# Patient Record
Sex: FEMALE | URBAN - METROPOLITAN AREA
[De-identification: names, ages, dates, MRNs, and addresses within clinical notes are randomized per-mention and may not be internally consistent; named-entity substitution may affect disease eponyms.]

---

## 2019-09-02 ENCOUNTER — INPATIENT (INPATIENT)
Facility: HOSPITAL | Age: 54
LOS: 21 days | Discharge: HOME | DRG: 853 | End: 2019-09-24
Attending: INTERNAL MEDICINE | Admitting: INTERNAL MEDICINE
Payer: MEDICAID

## 2019-09-02 VITALS
SYSTOLIC BLOOD PRESSURE: 109 MMHG | DIASTOLIC BLOOD PRESSURE: 66 MMHG | WEIGHT: 143.96 LBS | OXYGEN SATURATION: 86 % | HEART RATE: 128 BPM | HEIGHT: 63 IN | TEMPERATURE: 98 F | RESPIRATION RATE: 24 BRPM

## 2019-09-02 DIAGNOSIS — F11.10 OPIOID ABUSE, UNCOMPLICATED: ICD-10-CM

## 2019-09-02 DIAGNOSIS — J45.901 UNSPECIFIED ASTHMA WITH (ACUTE) EXACERBATION: ICD-10-CM

## 2019-09-02 DIAGNOSIS — D72.819 DECREASED WHITE BLOOD CELL COUNT, UNSPECIFIED: ICD-10-CM

## 2019-09-02 DIAGNOSIS — J18.1 LOBAR PNEUMONIA, UNSPECIFIED ORGANISM: ICD-10-CM

## 2019-09-02 DIAGNOSIS — Z29.9 ENCOUNTER FOR PROPHYLACTIC MEASURES, UNSPECIFIED: ICD-10-CM

## 2019-09-02 DIAGNOSIS — R07.89 OTHER CHEST PAIN: ICD-10-CM

## 2019-09-02 LAB
ALBUMIN SERPL ELPH-MCNC: 2.8 G/DL — LOW (ref 3.5–5)
ALP SERPL-CCNC: 74 U/L — SIGNIFICANT CHANGE UP (ref 40–120)
ALT FLD-CCNC: 30 U/L DA — SIGNIFICANT CHANGE UP (ref 10–60)
ANION GAP SERPL CALC-SCNC: 8 MMOL/L — SIGNIFICANT CHANGE UP (ref 5–17)
AST SERPL-CCNC: 29 U/L — SIGNIFICANT CHANGE UP (ref 10–40)
BILIRUB SERPL-MCNC: 0.9 MG/DL — SIGNIFICANT CHANGE UP (ref 0.2–1.2)
BUN SERPL-MCNC: 18 MG/DL — SIGNIFICANT CHANGE UP (ref 7–18)
CALCIUM SERPL-MCNC: 9 MG/DL — SIGNIFICANT CHANGE UP (ref 8.4–10.5)
CHLORIDE SERPL-SCNC: 100 MMOL/L — SIGNIFICANT CHANGE UP (ref 96–108)
CO2 SERPL-SCNC: 27 MMOL/L — SIGNIFICANT CHANGE UP (ref 22–31)
CREAT SERPL-MCNC: 1 MG/DL — SIGNIFICANT CHANGE UP (ref 0.5–1.3)
GLUCOSE SERPL-MCNC: 117 MG/DL — HIGH (ref 70–99)
HCT VFR BLD CALC: 42.3 % — SIGNIFICANT CHANGE UP (ref 34.5–45)
HGB BLD-MCNC: 14 G/DL — SIGNIFICANT CHANGE UP (ref 11.5–15.5)
MCHC RBC-ENTMCNC: 31.4 PG — SIGNIFICANT CHANGE UP (ref 27–34)
MCHC RBC-ENTMCNC: 33.1 GM/DL — SIGNIFICANT CHANGE UP (ref 32–36)
MCV RBC AUTO: 94.8 FL — SIGNIFICANT CHANGE UP (ref 80–100)
NRBC # BLD: 0 /100 WBCS — SIGNIFICANT CHANGE UP (ref 0–0)
PLATELET # BLD AUTO: 179 K/UL — SIGNIFICANT CHANGE UP (ref 150–400)
POTASSIUM SERPL-MCNC: 3.8 MMOL/L — SIGNIFICANT CHANGE UP (ref 3.5–5.3)
POTASSIUM SERPL-SCNC: 3.8 MMOL/L — SIGNIFICANT CHANGE UP (ref 3.5–5.3)
PROT SERPL-MCNC: 7.5 G/DL — SIGNIFICANT CHANGE UP (ref 6–8.3)
RBC # BLD: 4.46 M/UL — SIGNIFICANT CHANGE UP (ref 3.8–5.2)
RBC # FLD: 12.9 % — SIGNIFICANT CHANGE UP (ref 10.3–14.5)
SODIUM SERPL-SCNC: 135 MMOL/L — SIGNIFICANT CHANGE UP (ref 135–145)
TROPONIN I SERPL-MCNC: <0.015 NG/ML — SIGNIFICANT CHANGE UP (ref 0–0.04)
TROPONIN I SERPL-MCNC: <0.015 NG/ML — SIGNIFICANT CHANGE UP (ref 0–0.04)
WBC # BLD: 2.2 K/UL — LOW (ref 3.8–10.5)
WBC # FLD AUTO: 2.2 K/UL — LOW (ref 3.8–10.5)

## 2019-09-02 PROCEDURE — 71250 CT THORAX DX C-: CPT | Mod: 26

## 2019-09-02 PROCEDURE — 99285 EMERGENCY DEPT VISIT HI MDM: CPT

## 2019-09-02 PROCEDURE — 71045 X-RAY EXAM CHEST 1 VIEW: CPT | Mod: 26

## 2019-09-02 RX ORDER — ALBUTEROL 90 UG/1
1.25 AEROSOL, METERED ORAL EVERY 6 HOURS
Refills: 0 | Status: DISCONTINUED | OUTPATIENT
Start: 2019-09-02 | End: 2019-09-02

## 2019-09-02 RX ORDER — SODIUM CHLORIDE 9 MG/ML
1000 INJECTION INTRAMUSCULAR; INTRAVENOUS; SUBCUTANEOUS ONCE
Refills: 0 | Status: COMPLETED | OUTPATIENT
Start: 2019-09-02 | End: 2019-09-02

## 2019-09-02 RX ORDER — LORATADINE 10 MG/1
10 TABLET ORAL DAILY
Refills: 0 | Status: DISCONTINUED | OUTPATIENT
Start: 2019-09-02 | End: 2019-09-24

## 2019-09-02 RX ORDER — KETOROLAC TROMETHAMINE 30 MG/ML
15 SYRINGE (ML) INJECTION ONCE
Refills: 0 | Status: DISCONTINUED | OUTPATIENT
Start: 2019-09-02 | End: 2019-09-02

## 2019-09-02 RX ORDER — IPRATROPIUM/ALBUTEROL SULFATE 18-103MCG
3 AEROSOL WITH ADAPTER (GRAM) INHALATION ONCE
Refills: 0 | Status: COMPLETED | OUTPATIENT
Start: 2019-09-02 | End: 2019-09-02

## 2019-09-02 RX ORDER — CEFTRIAXONE 500 MG/1
1000 INJECTION, POWDER, FOR SOLUTION INTRAMUSCULAR; INTRAVENOUS ONCE
Refills: 0 | Status: COMPLETED | OUTPATIENT
Start: 2019-09-02 | End: 2019-09-02

## 2019-09-02 RX ORDER — METRONIDAZOLE 500 MG
500 TABLET ORAL EVERY 8 HOURS
Refills: 0 | Status: DISCONTINUED | OUTPATIENT
Start: 2019-09-02 | End: 2019-09-02

## 2019-09-02 RX ORDER — AZITHROMYCIN 500 MG/1
500 TABLET, FILM COATED ORAL ONCE
Refills: 0 | Status: COMPLETED | OUTPATIENT
Start: 2019-09-02 | End: 2019-09-02

## 2019-09-02 RX ORDER — MAGNESIUM SULFATE 500 MG/ML
2 VIAL (ML) INJECTION ONCE
Refills: 0 | Status: COMPLETED | OUTPATIENT
Start: 2019-09-02 | End: 2019-09-02

## 2019-09-02 RX ORDER — IPRATROPIUM/ALBUTEROL SULFATE 18-103MCG
3 AEROSOL WITH ADAPTER (GRAM) INHALATION EVERY 8 HOURS
Refills: 0 | Status: DISCONTINUED | OUTPATIENT
Start: 2019-09-02 | End: 2019-09-24

## 2019-09-02 RX ORDER — IPRATROPIUM/ALBUTEROL SULFATE 18-103MCG
3 AEROSOL WITH ADAPTER (GRAM) INHALATION EVERY 6 HOURS
Refills: 0 | Status: DISCONTINUED | OUTPATIENT
Start: 2019-09-02 | End: 2019-09-24

## 2019-09-02 RX ORDER — PIPERACILLIN AND TAZOBACTAM 4; .5 G/20ML; G/20ML
3.38 INJECTION, POWDER, LYOPHILIZED, FOR SOLUTION INTRAVENOUS EVERY 8 HOURS
Refills: 0 | Status: COMPLETED | OUTPATIENT
Start: 2019-09-02 | End: 2019-09-12

## 2019-09-02 RX ORDER — ACETAMINOPHEN 500 MG
650 TABLET ORAL EVERY 6 HOURS
Refills: 0 | Status: DISCONTINUED | OUTPATIENT
Start: 2019-09-02 | End: 2019-09-24

## 2019-09-02 RX ORDER — KETOROLAC TROMETHAMINE 30 MG/ML
15 SYRINGE (ML) INJECTION EVERY 8 HOURS
Refills: 0 | Status: DISCONTINUED | OUTPATIENT
Start: 2019-09-02 | End: 2019-09-05

## 2019-09-02 RX ORDER — PIPERACILLIN AND TAZOBACTAM 4; .5 G/20ML; G/20ML
3.38 INJECTION, POWDER, LYOPHILIZED, FOR SOLUTION INTRAVENOUS ONCE
Refills: 0 | Status: DISCONTINUED | OUTPATIENT
Start: 2019-09-02 | End: 2019-09-19

## 2019-09-02 RX ORDER — HEPARIN SODIUM 5000 [USP'U]/ML
5000 INJECTION INTRAVENOUS; SUBCUTANEOUS EVERY 8 HOURS
Refills: 0 | Status: DISCONTINUED | OUTPATIENT
Start: 2019-09-02 | End: 2019-09-11

## 2019-09-02 RX ORDER — ALBUTEROL 90 UG/1
2.5 AEROSOL, METERED ORAL EVERY 6 HOURS
Refills: 0 | Status: DISCONTINUED | OUTPATIENT
Start: 2019-09-02 | End: 2019-09-02

## 2019-09-02 RX ORDER — METHADONE HYDROCHLORIDE 40 MG/1
55 TABLET ORAL DAILY
Refills: 0 | Status: DISCONTINUED | OUTPATIENT
Start: 2019-09-02 | End: 2019-09-02

## 2019-09-02 RX ORDER — METRONIDAZOLE 500 MG
500 TABLET ORAL ONCE
Refills: 0 | Status: COMPLETED | OUTPATIENT
Start: 2019-09-02 | End: 2019-09-02

## 2019-09-02 RX ORDER — SODIUM CHLORIDE 9 MG/ML
1000 INJECTION INTRAMUSCULAR; INTRAVENOUS; SUBCUTANEOUS
Refills: 0 | Status: DISCONTINUED | OUTPATIENT
Start: 2019-09-02 | End: 2019-09-13

## 2019-09-02 RX ORDER — CEFTRIAXONE 500 MG/1
1000 INJECTION, POWDER, FOR SOLUTION INTRAMUSCULAR; INTRAVENOUS EVERY 24 HOURS
Refills: 0 | Status: DISCONTINUED | OUTPATIENT
Start: 2019-09-02 | End: 2019-09-02

## 2019-09-02 RX ORDER — AZITHROMYCIN 500 MG/1
TABLET, FILM COATED ORAL
Refills: 0 | Status: DISCONTINUED | OUTPATIENT
Start: 2019-09-02 | End: 2019-09-02

## 2019-09-02 RX ORDER — METRONIDAZOLE 500 MG
TABLET ORAL
Refills: 0 | Status: DISCONTINUED | OUTPATIENT
Start: 2019-09-02 | End: 2019-09-02

## 2019-09-02 RX ADMIN — Medication 15 MILLIGRAM(S): at 13:31

## 2019-09-02 RX ADMIN — HEPARIN SODIUM 5000 UNIT(S): 5000 INJECTION INTRAVENOUS; SUBCUTANEOUS at 22:01

## 2019-09-02 RX ADMIN — Medication 125 MILLIGRAM(S): at 09:21

## 2019-09-02 RX ADMIN — Medication 50 GRAM(S): at 09:21

## 2019-09-02 RX ADMIN — HEPARIN SODIUM 5000 UNIT(S): 5000 INJECTION INTRAVENOUS; SUBCUTANEOUS at 15:20

## 2019-09-02 RX ADMIN — Medication 15 MILLIGRAM(S): at 09:20

## 2019-09-02 RX ADMIN — Medication 40 MILLIGRAM(S): at 18:11

## 2019-09-02 RX ADMIN — Medication 3 MILLILITER(S): at 09:51

## 2019-09-02 RX ADMIN — Medication 3 MILLILITER(S): at 09:21

## 2019-09-02 RX ADMIN — Medication 15 MILLIGRAM(S): at 22:01

## 2019-09-02 RX ADMIN — Medication 100 MILLIGRAM(S): at 19:46

## 2019-09-02 RX ADMIN — Medication 100 MILLIGRAM(S): at 18:05

## 2019-09-02 RX ADMIN — Medication 3 MILLILITER(S): at 15:22

## 2019-09-02 RX ADMIN — Medication 15 MILLIGRAM(S): at 22:33

## 2019-09-02 RX ADMIN — Medication 15 MILLIGRAM(S): at 23:05

## 2019-09-02 RX ADMIN — Medication 3 MILLILITER(S): at 22:01

## 2019-09-02 RX ADMIN — Medication 15 MILLIGRAM(S): at 10:57

## 2019-09-02 RX ADMIN — SODIUM CHLORIDE 100 MILLILITER(S): 9 INJECTION INTRAMUSCULAR; INTRAVENOUS; SUBCUTANEOUS at 23:06

## 2019-09-02 RX ADMIN — Medication 3 MILLILITER(S): at 09:36

## 2019-09-02 RX ADMIN — SODIUM CHLORIDE 2000 MILLILITER(S): 9 INJECTION INTRAMUSCULAR; INTRAVENOUS; SUBCUTANEOUS at 20:43

## 2019-09-02 RX ADMIN — Medication 650 MILLIGRAM(S): at 20:43

## 2019-09-02 RX ADMIN — AZITHROMYCIN 250 MILLIGRAM(S): 500 TABLET, FILM COATED ORAL at 13:01

## 2019-09-02 RX ADMIN — PIPERACILLIN AND TAZOBACTAM 25 GRAM(S): 4; .5 INJECTION, POWDER, LYOPHILIZED, FOR SOLUTION INTRAVENOUS at 22:01

## 2019-09-02 RX ADMIN — CEFTRIAXONE 100 MILLIGRAM(S): 500 INJECTION, POWDER, FOR SOLUTION INTRAMUSCULAR; INTRAVENOUS at 10:47

## 2019-09-02 RX ADMIN — LORATADINE 10 MILLIGRAM(S): 10 TABLET ORAL at 18:15

## 2019-09-02 RX ADMIN — Medication 650 MILLIGRAM(S): at 19:46

## 2019-09-02 RX ADMIN — SODIUM CHLORIDE 2000 MILLILITER(S): 9 INJECTION INTRAMUSCULAR; INTRAVENOUS; SUBCUTANEOUS at 22:03

## 2019-09-02 NOTE — ED ADULT NURSE NOTE - NSIMPLEMENTINTERV_GEN_ALL_ED
Implemented All Universal Safety Interventions:  [place] to call system. Call bell, personal items and telephone within reach. Instruct patient to call for assistance. Room bathroom lighting operational. Non-slip footwear when patient is off stretcher. Physically safe environment: no spills, clutter or unnecessary equipment. Stretcher in lowest position, wheels locked, appropriate side rails in place.

## 2019-09-02 NOTE — H&P ADULT - NSHPPHYSICALEXAM_GEN_ALL_CORE
Vital Signs Last 24 Hrs  T(C): 37.2 (02 Sep 2019 11:30), Max: 37.2 (02 Sep 2019 11:30)  T(F): 99 (02 Sep 2019 11:30), Max: 99 (02 Sep 2019 11:30)  HR: 102 (02 Sep 2019 11:30) (102 - 128)  BP: 103/63 (02 Sep 2019 11:30) (103/63 - 109/66)  BP(mean): --  ABP: --  ABP(mean): --  RR: 18 (02 Sep 2019 11:30) (18 - 24)  SpO2: 97% (02 Sep 2019 11:30) (86% - 97%)      PHYSICAL EXAM:  GENERAL: NAD  HEENT: Normocephalic;  conjunctivae and sclerae clear; moist mucous membranes;   NECK : supple  CHEST/LUNG: Clear to auscultation bilaterally with good air entry   HEART: S1 S2  regular; no murmurs, gallops or rubs  ABDOMEN: Soft, Nontender, Nondistended; Bowel sounds present  EXTREMITIES: no cyanosis; no edema; no calf tenderness  SKIN: warm and dry; no rash  NERVOUS SYSTEM:  Awake and alert; Oriented  to place, person and time ; no new deficits Vital Signs Last 24 Hrs  T(C): 37.2 (02 Sep 2019 11:30), Max: 37.2 (02 Sep 2019 11:30)  T(F): 99 (02 Sep 2019 11:30), Max: 99 (02 Sep 2019 11:30)  HR: 102 (02 Sep 2019 11:30) (102 - 128)  BP: 103/63 (02 Sep 2019 11:30) (103/63 - 109/66)  RR: 18 (02 Sep 2019 11:30) (18 - 24)  SpO2: 97% (02 Sep 2019 11:30) (86% - 97%)      PHYSICAL EXAM:  GENERAL: female in distress due to dyspnea  HEENT: Normocephalic;  conjunctivae and sclerae clear; moist mucous membranes;   NECK : supple  CHEST/LUNG: breath sounds present b/l   HEART: S1 S2  regular; no murmurs, gallops or rubs  ABDOMEN: Soft, Nontender, Nondistended; Bowel sounds present  EXTREMITIES: no cyanosis; no edema; no calf tenderness  SKIN: warm and dry; no rash  NERVOUS SYSTEM:  Awake and alert; Oriented  to place, person and time ; no new deficits Vital Signs Last 24 Hrs  T(C): 37.2 (02 Sep 2019 11:30), Max: 37.2 (02 Sep 2019 11:30)  T(F): 99 (02 Sep 2019 11:30), Max: 99 (02 Sep 2019 11:30)  HR: 102 (02 Sep 2019 11:30) (102 - 128)  BP: 103/63 (02 Sep 2019 11:30) (103/63 - 109/66)  RR: 18 (02 Sep 2019 11:30) (18 - 24)  SpO2: 97% (02 Sep 2019 11:30) (86% - 97%)      PHYSICAL EXAM:  GENERAL: female in distress due to dyspnea  HEENT: Normocephalic;  conjunctivae and sclerae clear; moist mucous membranes;   NECK : supple  CHEST/LUNG: breath sounds present b/l, but decreased on right lower side.   HEART: S1 S2  regular; no murmurs, gallops or rubs  ABDOMEN: Soft, Nontender, Nondistended; Bowel sounds present  EXTREMITIES: no cyanosis; no edema; no calf tenderness  SKIN: warm and dry; no rash  NERVOUS SYSTEM:  Awake and alert; Oriented  to place, person and time ; no new deficits

## 2019-09-02 NOTE — H&P ADULT - PROBLEM SELECTOR PLAN 1
-P/w Cough, SOB, Chest pain since yesterday with 40 pack year history of smoking   - CXR: Opacification/consolidation of the right lower lung field. This most   likely represents pneumonia. A superimposed right pleural effusion cannot   be excluded.  CT chest: Soft tissue impaction of the right middle and lower lobe   bronchi. Extensive airspace consolidations of the right middle and lower   lobes. Overall findings may reflect severe multifocal pneumonia, however   close follow-up to in short resolution and exclude endobronchial mass.   Mild mediastinal adenopathy.  s/p : Rocephin, Azithromycin in Ed   - Will c/w Rocephin, Azithro   -Duo neb   -follow Blood culture   Repeat CT chest after discharge for concern of Malignancy -P/w Cough, SOB, Chest pain since yesterday with 40 pack year history of smoking   - CXR: Opacification/consolidation of the right lower lung field. This most   likely represents pneumonia. A superimposed right pleural effusion cannot   be excluded.  CT chest: Soft tissue impaction of the right middle and lower lobe   bronchi. Extensive airspace consolidations of the right middle and lower   lobes. Overall findings may reflect severe multifocal pneumonia, however   close follow-up to in short resolution and exclude endobronchial mass.   Mild mediastinal adenopathy.  s/p : Rocephin, Azithromycin in Ed   - Will c/w Rocephin, Azithro and Flagyl   -Duo neb   -follow Blood culture   CT chesT: concern for malignancy. Dr Guo consulted. Patient may need Bronchoscopy vs IR guided Biopsy depending on Pulmonary recommendation

## 2019-09-02 NOTE — H&P ADULT - ASSESSMENT
53 yo female with PMH of Asthma, presented with SOB, cough and right sided chest pain since yesterday morning. She started feeling short of breath, present at rest, not relieved with Albuterol inhaler, unable to ambulate due to SOB since yesterday. It was associated with wheeze and cough with whitish sputum. She also complains of subjective fever. Chest pain is present on right lower side, 7/10 in severity, on/off, non radiating, stabbing in nature, worse with sitting and moving and lying on that side, relieved with Motrin.     Admitted for Right sided PNA, Asthma exacerbation and concern for Malignancy 53 yo female with PMH of Asthma, presented with SOB, cough and right sided chest pain since yesterday morning. She started feeling short of breath, present at rest, not relieved with Albuterol inhaler, unable to ambulate due to SOB since yesterday. It was associated with wheeze and cough with whitish sputum. She also complains of subjective fever. Chest pain is present on right lower side, 7/10 in severity, on/off, non radiating, stabbing in nature, worse with sitting and moving and lying on that side, relieved with Motrin.     Admitted for Right sided PNA, Asthma exacerbation and concern for Malignancy     GOC; Full code

## 2019-09-02 NOTE — ED ADULT NURSE NOTE - OBJECTIVE STATEMENT
Patient came to the ED a/o x 3 BIBA for shortness of breath and "rib/ lung pain". Pt has no chest pain, no distress noted.

## 2019-09-02 NOTE — CONSULT NOTE ADULT - SUBJECTIVE AND OBJECTIVE BOX
Time of visit:    CHIEF COMPLAINT: Patient is a 54y old  Female who presents with a chief complaint of SOB, Right sided CP, cough since yesterday morning (02 Sep 2019 13:00)      HPI:  55 yo female with PMH of Asthma, presented with SOB, cough and right sided chest pain since yesterday morning. She started feeling short of breath, present at rest, not relieved with Albuterol inhaler, unable to ambulate due to SOB since yesterday. It was associated with wheeze and cough with whitish sputum. She also complains of subjective fever. Chest pain is present on right lower side, 7/10 in severity, on/off, non radiating, stabbing in nature, worse with sitting and moving and lying on that side, relieved with Motrin.     Off note, she quit Heroin abuse on August 3rd and since then has been on Methadone 55mg OD. She goes to the Methadone clinic every day, 6 days a week for her Methadone dose. Clinic #: [phone]-Ext: [phone]. She has taken her dose for today. Primary team to call Clinic to confirm dose. Clinic was closed today. (02 Sep 2019 13:00)   Patient seen and examined.     PAST MEDICAL & SURGICAL HISTORY:  Asthma  No significant past surgical history      Allergies    No Known Allergies    Intolerances        MEDICATIONS  (STANDING):  ALBUTerol/ipratropium for Nebulization 3 milliLiter(s) Nebulizer every 6 hours  azithromycin  IVPB      cefTRIAXone   IVPB 1000 milliGRAM(s) IV Intermittent every 24 hours  heparin  Injectable 5000 Unit(s) SubCutaneous every 8 hours  loratadine 10 milliGRAM(s) Oral daily  methylPREDNISolone sodium succinate Injectable 40 milliGRAM(s) IV Push every 6 hours  metroNIDAZOLE  IVPB 500 milliGRAM(s) IV Intermittent every 8 hours  metroNIDAZOLE  IVPB          MEDICATIONS  (PRN):  acetaminophen   Tablet .. 650 milliGRAM(s) Oral every 6 hours PRN Temp greater or equal to 38C (100.4F), Mild Pain (1 - 3)  ALBUTerol/ipratropium for Nebulization 3 milliLiter(s) Nebulizer every 8 hours PRN Bronchospasm  guaiFENesin    Syrup 100 milliGRAM(s) Oral every 6 hours PRN Cough  ketorolac   Injectable 15 milliGRAM(s) IV Push every 8 hours PRN Moderate Pain (4 - 6)   Medications up to date at time of exam.    Medications up to date at time of exam.    FAMILY HISTORY:  No pertinent family history in first degree relatives      SOCIAL HISTORY  Smoking History: [   ] smoking/smoke exposure, [   ] former smoker  Living Condition: [   ] apartment, [   ] private house  Work History:   Travel History: denies recent travel  Illicit Substance Use: denies  Alcohol Use: denies    REVIEW OF SYSTEMS:    CONSTITUTIONAL:  denies fevers, chills, sweats, weight loss    HEENT:  denies diplopia or blurred vision, sore throat or runny nose.    CARDIOVASCULAR:  denies pressure, squeezing, tightness, or heaviness about the chest; no palpitations.    RESPIRATORY:  denies SOB, cough, PARRISH, wheezing.    GASTROINTESTINAL:  denies abdominal pain, nausea, vomiting or diarrhea.    GENITOURINARY: denies dysuria, frequency or urgency.    NEUROLOGIC:  denies numbness, tingling, seizures or weakness.    PSYCHIATRIC:  denies disorder of thought or mood.    MSK: denies swelling, redness      PHYSICAL EXAMINATION:    GENERAL: The patient is a well-developed, well-nourished, in no apparent distress.     Vital Signs Last 24 Hrs  T(C): 37.1 (02 Sep 2019 15:00), Max: 37.2 (02 Sep 2019 11:30)  T(F): 98.8 (02 Sep 2019 15:00), Max: 99 (02 Sep 2019 11:30)  HR: 110 (02 Sep 2019 15:00) (102 - 128)  BP: 103/68 (02 Sep 2019 15:00) (103/63 - 109/66)  BP(mean): 75 (02 Sep 2019 15:00) (75 - 75)  RR: 22 (02 Sep 2019 15:00) (18 - 24)  SpO2: 99% (02 Sep 2019 15:00) (86% - 99%)   (if applicable)    Chest Tube (if applicable)    HEENT: Head is normocephalic and atraumatic. Extraocular muscles are intact. Mucous membranes are moist.     NECK: Supple, no palpable adenopathy.    LUNGS: Clear to auscultation, no wheezing, rales, or rhonchi.    HEART: Regular rate and rhythm without murmur.    ABDOMEN: Soft, nontender, and nondistended.  No hepatosplenomegaly is noted.    RENAL: No difficulty voiding, no pelvic pain    EXTREMITIES: Without any cyanosis, clubbing, rash, lesions or edema.    NEUROLOGIC: Awake, alert, oriented, grossly intact    SKIN: Warm, dry, good turgor.      LABS:                        14.0   2.20  )-----------( 179      ( 02 Sep 2019 09:20 )             42.3     09-02    135  |  100  |  18  ----------------------------<  117<H>  3.8   |  27  |  1.00    Ca    9.0      02 Sep 2019 09:20    TPro  7.5  /  Alb  2.8<L>  /  TBili  0.9  /  DBili  x   /  AST  29  /  ALT  30  /  AlkPhos  74  09-02          CARDIAC MARKERS ( 02 Sep 2019 16:58 )  <0.015 ng/mL / x     / x     / x     / x      CARDIAC MARKERS ( 02 Sep 2019 09:20 )  <0.015 ng/mL / x     / x     / x     / x                    MICROBIOLOGY: (if applicable)    RADIOLOGY & ADDITIONAL STUDIES:  EKG:   CXR:  ECHO:    IMPRESSION: 54y Female PAST MEDICAL & SURGICAL HISTORY:  Asthma  No significant past surgical history   p/w                   RECOMMENDATIONS: Time of visit:    CHIEF COMPLAINT: Patient is a 54y old  Female who presents with a chief complaint of SOB, Right sided CP, cough since yesterday morning (02 Sep 2019 13:00)      HPI:  55 yo female with PMH of Asthma, presented with SOB, cough and right sided chest pain since yesterday morning. She started feeling short of breath, present at rest, not relieved with Albuterol inhaler, unable to ambulate due to SOB since yesterday. It was associated with wheeze and cough with whitish sputum. She also complains of subjective fever. Chest pain is present on right lower side, 7/10 in severity, on/off, non radiating, stabbing in nature, worse with sitting and moving and lying on that side, relieved with Motrin.     Off note, she quit Heroin abuse on August 3rd and since then has been on Methadone 55mg OD. She goes to the Methadone clinic every day, 6 days a week for her Methadone dose. Clinic #: [phone]-Ext: [phone]. She has taken her dose for today. Primary team to call Clinic to confirm dose. Clinic was closed today. (02 Sep 2019 13:00)   Patient seen and examined.     PAST MEDICAL & SURGICAL HISTORY:  Asthma  No significant past surgical history      Allergies    No Known Allergies    Intolerances        MEDICATIONS  (STANDING):  ALBUTerol/ipratropium for Nebulization 3 milliLiter(s) Nebulizer every 6 hours  azithromycin  IVPB      cefTRIAXone   IVPB 1000 milliGRAM(s) IV Intermittent every 24 hours  heparin  Injectable 5000 Unit(s) SubCutaneous every 8 hours  loratadine 10 milliGRAM(s) Oral daily  methylPREDNISolone sodium succinate Injectable 40 milliGRAM(s) IV Push every 6 hours  metroNIDAZOLE  IVPB 500 milliGRAM(s) IV Intermittent every 8 hours  metroNIDAZOLE  IVPB          MEDICATIONS  (PRN):  acetaminophen   Tablet .. 650 milliGRAM(s) Oral every 6 hours PRN Temp greater or equal to 38C (100.4F), Mild Pain (1 - 3)  ALBUTerol/ipratropium for Nebulization 3 milliLiter(s) Nebulizer every 8 hours PRN Bronchospasm  guaiFENesin    Syrup 100 milliGRAM(s) Oral every 6 hours PRN Cough  ketorolac   Injectable 15 milliGRAM(s) IV Push every 8 hours PRN Moderate Pain (4 - 6)   Medications up to date at time of exam.    Medications up to date at time of exam.    FAMILY HISTORY:  No pertinent family history in first degree relatives      SOCIAL HISTORY  Smoking History: [x   ] smoking/smoke exposure, 40 pack years  Living Condition: [   ] apartment, [   ] private house  Work History:   Travel History: denies recent travel  Illicit Substance Use: denies  Alcohol Use: denies    REVIEW OF SYSTEMS:    CONSTITUTIONAL:  denies fevers, chills, sweats, weight loss    HEENT:  denies diplopia or blurred vision, sore throat or runny nose.    CARDIOVASCULAR:  denies pressure, squeezing, tightness, or heaviness about the chest; no palpitations.    RESPIRATORY:  denies SOB, cough, PARRISH, wheezing.    GASTROINTESTINAL:  denies abdominal pain, nausea, vomiting or diarrhea.    GENITOURINARY: denies dysuria, frequency or urgency.    NEUROLOGIC:  denies numbness, tingling, seizures or weakness.    PSYCHIATRIC:  denies disorder of thought or mood.    MSK: denies swelling, redness      PHYSICAL EXAMINATION:    GENERAL: The patient is a well-developed, well-nourished, in no apparent distress.     Vital Signs Last 24 Hrs  T(C): 37.1 (02 Sep 2019 15:00), Max: 37.2 (02 Sep 2019 11:30)  T(F): 98.8 (02 Sep 2019 15:00), Max: 99 (02 Sep 2019 11:30)  HR: 110 (02 Sep 2019 15:00) (102 - 128)  BP: 103/68 (02 Sep 2019 15:00) (103/63 - 109/66)  BP(mean): 75 (02 Sep 2019 15:00) (75 - 75)  RR: 22 (02 Sep 2019 15:00) (18 - 24)  SpO2: 99% (02 Sep 2019 15:00) (86% - 99%)   (if applicable)    Chest Tube (if applicable)    HEENT: Head is normocephalic and atraumatic. Extraocular muscles are intact. Mucous membranes are moist.     NECK: Supple, no palpable adenopathy.    LUNGS: Clear to auscultation, no wheezing, rales, or rhonchi.    HEART: Regular rate and rhythm without murmur.    ABDOMEN: Soft, nontender, and nondistended.  No hepatosplenomegaly is noted.    RENAL: No difficulty voiding, no pelvic pain    EXTREMITIES: Without any cyanosis, clubbing, rash, lesions or edema.    NEUROLOGIC: Awake, alert, oriented, grossly intact    SKIN: Warm, dry, good turgor.      LABS:                        14.0   2.20  )-----------( 179      ( 02 Sep 2019 09:20 )             42.3     09-02    135  |  100  |  18  ----------------------------<  117<H>  3.8   |  27  |  1.00    Ca    9.0      02 Sep 2019 09:20    TPro  7.5  /  Alb  2.8<L>  /  TBili  0.9  /  DBili  x   /  AST  29  /  ALT  30  /  AlkPhos  74  09-02          CARDIAC MARKERS ( 02 Sep 2019 16:58 )  <0.015 ng/mL / x     / x     / x     / x      CARDIAC MARKERS ( 02 Sep 2019 09:20 )  <0.015 ng/mL / x     / x     / x     / x                    MICROBIOLOGY: (if applicable)    RADIOLOGY & ADDITIONAL STUDIES:  EKG:   CXR: < from: CT Chest No Cont (09.02.19 @ 12:33) >    EXAM:  CT CHEST                            PROCEDURE DATE:  09/02/2019          INTERPRETATION:  CLINICAL INFORMATION: Pneumonia    COMPARISON: None.    PROCEDURE:   CT of the Chest was performed without intravenous contrast.  Sagittal and coronal reformats were performed.      FINDINGS:    CHEST:     CHEST WALL AND LOWER NECK: Within normal limits  MEDIASTINUM AND GÓMEZ: Mild mediastinal and 2.2 cm subtracheal adenopathy.  HEART: Within normal limits  VESSELS: Within normal limits  LUNG AND LARGE AIRWAYS: Complete opacification of the right middle and   right lower lobe bronchi. Dense confluent airspace consolidation of the   right middle and right lower lobes. Patchy and tree-in-bud airspace   opacities right upper lobe. Paraseptal emphysema. Trace right pleural   effusion.  VISUALIZED UPPER ABDOMEN: Within normal limits.  BONES: Multiple old left-sided rib fractures.    IMPRESSION: Soft tissue impaction of the right middle and lower lobe   bronchi. Extensive airspace consolidations ofthe right middle and lower   lobes. Overall findings may reflect severe multifocal pneumonia, however   close follow-up to in short resolution and exclude endobronchial mass.   Mild mediastinal adenopathy.                      CHLOE CAMPA M.D., ATTENDING RADIOLOGIST  This document has been electronically signed. Sep  2 2019  1:30PM                < end of copied text >    ECHO:    IMPRESSION: 54y Female PAST MEDICAL & SURGICAL HISTORY:  Asthma  No significant past surgical history   p/w         IMP: This is 54 yr old woman active  smoker ( 40 pack years)  on methadone for heroine use admitted with pna.  CT chest show RML bronchus obstruction with mediastinal adenopathy may be due to pan vs malignancy          RECOMMENDATIONS:  -continue antibx  -f/u cultures  -methadone  -dupnebs q6h  -may need bronchoscopy if no improvement  -dvt/gi prophy  -prednisone 20 mg daily x 5 days  -advise to stop smoking  -out pat pul f/u  -nicotine patch Time of visit:    CHIEF COMPLAINT: Patient is a 54y old  Female who presents with a chief complaint of SOB, Right sided CP, cough since yesterday morning (02 Sep 2019 13:00)      HPI:  53 yo female with PMH of Asthma, presented with SOB, cough and right sided chest pain since yesterday morning. She started feeling short of breath, present at rest, not relieved with Albuterol inhaler, unable to ambulate due to SOB since yesterday. It was associated with wheeze and cough with whitish sputum. She also complains of subjective fever. Chest pain is present on right lower side, 7/10 in severity, on/off, non radiating, stabbing in nature, worse with sitting and moving and lying on that side, relieved with Motrin.     Off note, she quit Heroin abuse on August 3rd and since then has been on Methadone 55mg OD. She goes to the Methadone clinic every day, 6 days a week for her Methadone dose. Clinic #: [phone]-Ext: [phone]. She has taken her dose for today. Primary team to call Clinic to confirm dose. Clinic was closed today. (02 Sep 2019 13:00)   Patient seen and examined.     PAST MEDICAL & SURGICAL HISTORY:  Asthma  No significant past surgical history      Allergies    No Known Allergies    Intolerances        MEDICATIONS  (STANDING):  ALBUTerol/ipratropium for Nebulization 3 milliLiter(s) Nebulizer every 6 hours  azithromycin  IVPB      cefTRIAXone   IVPB 1000 milliGRAM(s) IV Intermittent every 24 hours  heparin  Injectable 5000 Unit(s) SubCutaneous every 8 hours  loratadine 10 milliGRAM(s) Oral daily  methylPREDNISolone sodium succinate Injectable 40 milliGRAM(s) IV Push every 6 hours  metroNIDAZOLE  IVPB 500 milliGRAM(s) IV Intermittent every 8 hours  metroNIDAZOLE  IVPB          MEDICATIONS  (PRN):  acetaminophen   Tablet .. 650 milliGRAM(s) Oral every 6 hours PRN Temp greater or equal to 38C (100.4F), Mild Pain (1 - 3)  ALBUTerol/ipratropium for Nebulization 3 milliLiter(s) Nebulizer every 8 hours PRN Bronchospasm  guaiFENesin    Syrup 100 milliGRAM(s) Oral every 6 hours PRN Cough  ketorolac   Injectable 15 milliGRAM(s) IV Push every 8 hours PRN Moderate Pain (4 - 6)   Medications up to date at time of exam.    Medications up to date at time of exam.    FAMILY HISTORY:  No pertinent family history in first degree relatives      SOCIAL HISTORY  Smoking History: [x   ] smoking/smoke exposure, 40 pack years  Living Condition: [   ] apartment, [   ] private house  Work History:   Travel History: denies recent travel  Illicit Substance Use: denies  Alcohol Use: denies    REVIEW OF SYSTEMS:    CONSTITUTIONAL:  denies fevers, chills, sweats, weight loss    HEENT:  denies diplopia or blurred vision, sore throat or runny nose.    CARDIOVASCULAR:  denies pressure, squeezing, tightness, or heaviness about the chest; no palpitations.    RESPIRATORY:  denies SOB, cough, PARRISH, wheezing.    GASTROINTESTINAL:  denies abdominal pain, nausea, vomiting or diarrhea.    GENITOURINARY: denies dysuria, frequency or urgency.    NEUROLOGIC:  denies numbness, tingling, seizures or weakness.    PSYCHIATRIC:  denies disorder of thought or mood.    MSK: denies swelling, redness      PHYSICAL EXAMINATION:    GENERAL: The patient is a well-developed, well-nourished, in no apparent distress.     Vital Signs Last 24 Hrs  T(C): 37.1 (02 Sep 2019 15:00), Max: 37.2 (02 Sep 2019 11:30)  T(F): 98.8 (02 Sep 2019 15:00), Max: 99 (02 Sep 2019 11:30)  HR: 110 (02 Sep 2019 15:00) (102 - 128)  BP: 103/68 (02 Sep 2019 15:00) (103/63 - 109/66)  BP(mean): 75 (02 Sep 2019 15:00) (75 - 75)  RR: 22 (02 Sep 2019 15:00) (18 - 24)  SpO2: 99% (02 Sep 2019 15:00) (86% - 99%)   (if applicable)    Chest Tube (if applicable)    HEENT: Head is normocephalic and atraumatic. Extraocular muscles are intact. Mucous membranes are moist.     NECK: Supple, no palpable adenopathy.    LUNGS: Clear to auscultation, no wheezing, rales, or rhonchi.    HEART: Regular rate and rhythm without murmur.    ABDOMEN: Soft, nontender, and nondistended.  No hepatosplenomegaly is noted.    RENAL: No difficulty voiding, no pelvic pain    EXTREMITIES: Without any cyanosis, clubbing, rash, lesions or edema.    NEUROLOGIC: Awake, alert, oriented, grossly intact    SKIN: Warm, dry, good turgor.      LABS:                        14.0   2.20  )-----------( 179      ( 02 Sep 2019 09:20 )             42.3     09-02    135  |  100  |  18  ----------------------------<  117<H>  3.8   |  27  |  1.00    Ca    9.0      02 Sep 2019 09:20    TPro  7.5  /  Alb  2.8<L>  /  TBili  0.9  /  DBili  x   /  AST  29  /  ALT  30  /  AlkPhos  74  09-02          CARDIAC MARKERS ( 02 Sep 2019 16:58 )  <0.015 ng/mL / x     / x     / x     / x      CARDIAC MARKERS ( 02 Sep 2019 09:20 )  <0.015 ng/mL / x     / x     / x     / x                    MICROBIOLOGY: (if applicable)    RADIOLOGY & ADDITIONAL STUDIES:  EKG:   CXR: < from: CT Chest No Cont (09.02.19 @ 12:33) >    EXAM:  CT CHEST                            PROCEDURE DATE:  09/02/2019          INTERPRETATION:  CLINICAL INFORMATION: Pneumonia    COMPARISON: None.    PROCEDURE:   CT of the Chest was performed without intravenous contrast.  Sagittal and coronal reformats were performed.      FINDINGS:    CHEST:     CHEST WALL AND LOWER NECK: Within normal limits  MEDIASTINUM AND GÓMEZ: Mild mediastinal and 2.2 cm subtracheal adenopathy.  HEART: Within normal limits  VESSELS: Within normal limits  LUNG AND LARGE AIRWAYS: Complete opacification of the right middle and   right lower lobe bronchi. Dense confluent airspace consolidation of the   right middle and right lower lobes. Patchy and tree-in-bud airspace   opacities right upper lobe. Paraseptal emphysema. Trace right pleural   effusion.  VISUALIZED UPPER ABDOMEN: Within normal limits.  BONES: Multiple old left-sided rib fractures.    IMPRESSION: Soft tissue impaction of the right middle and lower lobe   bronchi. Extensive airspace consolidations ofthe right middle and lower   lobes. Overall findings may reflect severe multifocal pneumonia, however   close follow-up to in short resolution and exclude endobronchial mass.   Mild mediastinal adenopathy.                      CHLOE CAMPA M.D., ATTENDING RADIOLOGIST  This document has been electronically signed. Sep  2 2019  1:30PM                < end of copied text >    ECHO:    IMPRESSION: 54y Female PAST MEDICAL & SURGICAL HISTORY:  Asthma  No significant past surgical history   p/w         IMP: This is 54 yr old woman active  smoker ( 40 pack years)  on methadone for heroine use admitted with pna.  CT chest show RML bronchus obstruction with mediastinal adenopathy may be due to pan vs malignancy          RECOMMENDATIONS:  -continue antibx  -f/u cultures  -HIV testing  -methadone  -dupnebs q6h  -may need bronchoscopy if no improvement  -dvt/gi prophy  -prednisone 20 mg daily x 5 days  -advise to stop smoking  -out pat pul f/u  -nicotine patch

## 2019-09-02 NOTE — ED ADULT TRIAGE NOTE - CHIEF COMPLAINT QUOTE
biba with c/o sob for two days and low 02 sat , sat is 86 on room air. patient states " I have a problem with my lung " , pt is very anxious refuses 02

## 2019-09-02 NOTE — ED PROVIDER NOTE - OBJECTIVE STATEMENT
55 yo female with pmh of asthma, current smoker presents to the ED c/o right sided chest pain and sob for 1 day. Pt states sob is a/w cough with phlegm and subjective fever. She states that albuterol is not helping her. She states chest pain is located on the right lower side and is worse with movement. She denies N/V/D, abd pain, headache, dizziness, sick contacts, recent travel.

## 2019-09-02 NOTE — H&P ADULT - HISTORY OF PRESENT ILLNESS
55 yo female with PMH of Asthma, presented with SOB, cough and right sided chest pain since yesterday morning. She started feeling short of breath, present at rest, not relieved with Albuterol inhaler, unable to ambulate due to SOB since yesterday. It was associated with wheeze and cough with whitish sputum. She also complains of subjective fever. Chest pain is present on right lower side, 7/10 in severity, on/off, non radiating, stabbing in nature, worse with sitting and moving and lying on that side, relieved with Motrin.     Off note, she quit Heroin abuse on August 3rd and since then has been on Methadone 55mg OD. She goes to the Methadone clinic every day, 6 days a week for her Methadone dose. Clinic #: [phone]-Ext: [phone]. She has taken her dose for today. Primary team to call Clinic to confirm dose. Clinic was closed today.

## 2019-09-02 NOTE — ED ADULT NURSE NOTE - PAIN RATING/NUMBER SCALE (0-10): REST
Admitting Diagnosis:   74yM with PMH severe MR/AR presented after recent dx acute cholecystitis and RLL PNA at [facility] with lower abdominal pain, new onset rapid Afib, sepsis, acute CHF exacerbation, initially admitted to cardiology on bipap, then transferred to SICU with perforated duodenum s/p Shreyas patch and jejunostomy (1/20) c/b breakdown s/p RTOR, washout, new Shreyas patch, gastrostomy, duodenostomy, IVCF (1/22)    PAST MEDICAL & SURGICAL HISTORY:  Mitral regurgitation  Aortic regurgitation  No significant past surgical history    Current Nutrition Order:   Glucerna 1.2 at 68ml/hr x 24hr + 1x prostat via j-tube to (1632ml TV, 2058kcal, 113g protein, 1314ml fluid). EN running at goal rate.     GI Issues:   Having regular BMs  No n/v/d/c     Pain:  Controlled     Skin Integrity:   Forehead scab  R/L toes necrosis  Midline abd ASW  B/L heel suspected DTI    Labs:   02-26    143  |  107  |  32<H>  ----------------------------<  140<H>  4.1   |  27  |  0.49<L>    Ca    8.0<L>      26 Feb 2018 05:45  Phos  2.9     02-26  Mg     2.3     02-26    TPro  6.4  /  Alb  2.6<L>  /  TBili  1.4<H>  /  DBili  x   /  AST  44<H>  /  ALT  50<H>  /  AlkPhos  202<H>  02-26    CAPILLARY BLOOD GLUCOSE      POCT Blood Glucose.: 146 mg/dL (26 Feb 2018 10:53)  POCT Blood Glucose.: 134 mg/dL (26 Feb 2018 06:12)  POCT Blood Glucose.: 169 mg/dL (26 Feb 2018 00:05)  POCT Blood Glucose.: 156 mg/dL (25 Feb 2018 22:18)  POCT Blood Glucose.: 145 mg/dL (25 Feb 2018 17:22)      Medications:  MEDICATIONS  (STANDING):  ALBUTerol/ipratropium for Nebulization 3 milliLiter(s) Nebulizer every 6 hours  apixaban 5 milliGRAM(s) Oral every 12 hours  chlorhexidine 0.12% Liquid 15 milliLiter(s) Swish and Spit two times a day  digoxin     Tablet 0.125 milliGRAM(s) Oral daily  docusate sodium Liquid 100 milliGRAM(s) Oral two times a day  insulin lispro (HumaLOG) corrective regimen sliding scale   SubCutaneous every 6 hours  lidocaine 2% Injectable 50 milliLiter(s) Local Injection once  pantoprazole  Injectable 40 milliGRAM(s) IV Push every 12 hours  povidone iodine 10% Solution 1 Application(s) Topical daily  sodium chloride 0.9% lock flush 20 milliLiter(s) IV Push once  tamsulosin 0.4 milliGRAM(s) Oral at bedtime    MEDICATIONS  (PRN):  acetaminophen    Suspension 650 milliGRAM(s) Enteral Tube every 6 hours PRN For Temp greater than 38 C (100.4 F)  bisacodyl Suppository 10 milliGRAM(s) Rectal daily PRN Constipation  sodium chloride 0.9% lock flush 10 milliLiter(s) IV Push every 1 hour PRN After each medication administration      Weight:  No new weights since admission - discussed with RN    Estimated energy needs using 75kg IBW:  25-30 kcal/kg (1875-2250kcal).   1.4-1.6 g/kg (105-120g protein).   30-35 ml/kg (2250-2625ml fluid).      Subjective:   Pt seen resting OOB in chair. TF running at goal and tolerated well per RN. No apparent GI distress and pain. SLP (2/26) placed PMSV and recommends ice chips for swallow stimulation and swallow practice.      Previous Nutrition Diagnosis:   Increased nutrient needs RT increased demand for kcal & protein AEB critical illness, intubation.     Active [X]  Resolved [   ]    Goal:   Continue to meet % of nutrition needs via tolerated route.     Recommendations:  1. Continue Glucerna 1.2 at 68ml/hr x 24hr + 1x prostat via j-tube to (1632ml TV, 2058kcal, 113g protein (1.5g/kg of IBW), 1314ml fluid). Monitor for s/s of intolerance.   2. Trend daily weights.   3. Bowel regimen PRN    Education:   N/A    Risk Level: High [X] Moderate [   ] Low [   ]. 5

## 2019-09-02 NOTE — H&P ADULT - NSHPREVIEWOFSYSTEMS_GEN_ALL_CORE
REVIEW OF SYSTEMS:  CONSTITUTIONAL: No fever,   EYES: no acute visual disturbances  NECK: No pain or stiffness  RESPIRATORY: No cough; No shortness of breath  CARDIOVASCULAR: No chest pain, no palpitations  GASTROINTESTINAL: No pain. No nausea or vomiting; No diarrhea   NEUROLOGICAL: No headache or numbness, no tremors  MUSCULOSKELETAL: No joint pain, no muscle pain  GENITOURINARY: no dysuria, no frequency, no hesitancy  PSYCHIATRY: no depression , no anxiety  ALL OTHER  ROS negative REVIEW OF SYSTEMS:  CONSTITUTIONAL: no weight loss   EYES: no acute visual disturbances  NECK: No pain or stiffness  RESPIRATORY: as above   CARDIOVASCULAR: +chest pain, no palpitations  GASTROINTESTINAL: No pain. No nausea or vomiting; No diarrhea   NEUROLOGICAL: No headache or numbness, no tremors  MUSCULOSKELETAL: No joint pain, no muscle pain  GENITOURINARY: no dysuria, no frequency, no hesitancy  PSYCHIATRY: no depression , no anxiety  ALL OTHER  ROS negative

## 2019-09-02 NOTE — H&P ADULT - PROBLEM SELECTOR PLAN 2
p/w SOB with  wheezing s/p : Rocephin, Azithromycin, Duo neb, Solu poli, Mg So4 in Ed p/w SOB with  wheezing   s/p :  Duo neb, Solu poli, Mg So4 in Ed  -CXR, CT chest; as above   -peak flow: 110 in ED   - C/w solu medrol 40 q6, Ailin Guo consulted.

## 2019-09-02 NOTE — ED PROVIDER NOTE - CLINICAL SUMMARY MEDICAL DECISION MAKING FREE TEXT BOX
53 yo female was seen in the ED for sob and cough for 1 day.     labs  cxr  toradol  ivf  rocephin/azithromycin  chest CT  will admit to medicine for pneumonia

## 2019-09-02 NOTE — H&P ADULT - NSHPSOCIALHISTORY_GEN_ALL_CORE
from home lives with a cousin, walks independently, occasion Alcohol intake, smoked a pack of cigarettes a day from last 40 years.     She quit Heroin abuse on August 3rd and since then has been on Methadone 55mg OD. She goes to the Methadone clinic every day, 6 days a week for her Methadone dose.

## 2019-09-02 NOTE — H&P ADULT - PROBLEM SELECTOR PLAN 5
IMPROVE VTE Individual Risk Assessment  RISK                                                                Points  [  ] Previous VTE                                                  3  [  ] Thrombophilia                                               2  [  ] Lower limb paralysis                                      2        (unable to hold up >15 seconds)    [  ] Current Cancer                                              2         (within 6 months)  [x  ] Immobilization > 24 hrs                                1  [  ] ICU/CCU stay > 24 hours                              1  [  ] Age > 60                                                      1  IMPROVE VTE Score ________1, heparin for DVT proph She quit Heroin abuse on August 3rd and since then has been on Methadone 55mg OD. She goes to the Methadone clinic every day, 6 days a week for her Methadone dose. Clinic #: [phone]-Ext: [phone]. She has taken her dose for today. Primary team to call Clinic to confirm dose. Clinic was closed today.

## 2019-09-02 NOTE — CHART NOTE - NSCHARTNOTEFT_GEN_A_CORE
Patient Bp dropped to 89/62 with . Ordered 2 L Ns bolus stat. Started on IV NS 100cc/hour.   Antibiotic coverage changed to Zosyn in setting of multifocal PNA. JERI Boone Patient Bp dropped to 89/62 with . Ordered 2 L Ns bolus stat. Started on IV NS 100cc/hour.   Antibiotic coverage changed to Zosyn in setting of multifocal PNA. ID Dr Hopkins consulted

## 2019-09-02 NOTE — H&P ADULT - PROBLEM SELECTOR PLAN 4
She quit Heroin abuse on August 3rd and since then has been on Methadone 55mg OD. She goes to the Methadone clinic every day, 6 days a week for her Methadone dose. Clinic #: [phone]-Ext: [phone]. She has taken her dose for today. Primary team to call Clinic to confirm dose. Clinic was closed today. WBC: 2.2K   unknown etiology   could be due to sepsis   Monitor CBC  for now

## 2019-09-02 NOTE — H&P ADULT - PROBLEM SELECTOR PLAN 3
p/w Right side chest pain, worse with ambulation, and lying on right side.  Tenderness present on palpation. CXR; right sided PNa   -EKG: no acute changes  -T1:neg, follow T2, T3  -most likely due to costochondritis, Toradol for inflammatory pain   -But will monitor on Tele for any cardiac causes.   ECHo   -Cardio consulted p/w Right side chest pain, worse with ambulation, and lying on right side.  Tenderness present on palpation. CXR; right sided PNa   -EKG: no acute changes  -T1:neg, follow T2, T3  -most likely due to costochondritis, Toradol for inflammatory pain   -But will monitor on Tele for any cardiac causes.   ECHo

## 2019-09-02 NOTE — H&P ADULT - PROBLEM SELECTOR PLAN 6
IMPROVE VTE Individual Risk Assessment  RISK                                                                Points  [  ] Previous VTE                                                  3  [  ] Thrombophilia                                               2  [  ] Lower limb paralysis                                      2        (unable to hold up >15 seconds)    [  ] Current Cancer                                              2         (within 6 months)  [x  ] Immobilization > 24 hrs                                1  [  ] ICU/CCU stay > 24 hours                              1  [  ] Age > 60                                                      1  IMPROVE VTE Score ________1, heparin for DVT proph

## 2019-09-02 NOTE — ED ADULT NURSE NOTE - ED STAT RN HANDOFF DETAILS
Endorsed to OWEN Pappas in ED hold pending antibiotic when pt comes back from CT scan. Pt. is stable and on tele box D

## 2019-09-03 LAB
-  K. PNEUMONIAE GROUP: SIGNIFICANT CHANGE UP
24R-OH-CALCIDIOL SERPL-MCNC: 24.7 NG/ML — LOW (ref 30–80)
ALBUMIN SERPL ELPH-MCNC: 2.1 G/DL — LOW (ref 3.5–5)
ALP SERPL-CCNC: 42 U/L — SIGNIFICANT CHANGE UP (ref 40–120)
ALT FLD-CCNC: 26 U/L DA — SIGNIFICANT CHANGE UP (ref 10–60)
ANION GAP SERPL CALC-SCNC: 8 MMOL/L — SIGNIFICANT CHANGE UP (ref 5–17)
APPEARANCE UR: CLEAR — SIGNIFICANT CHANGE UP
AST SERPL-CCNC: 52 U/L — HIGH (ref 10–40)
BACTERIA # UR AUTO: ABNORMAL /HPF
BASOPHILS # BLD AUTO: 0 K/UL — SIGNIFICANT CHANGE UP (ref 0–0.2)
BASOPHILS NFR BLD AUTO: 0 % — SIGNIFICANT CHANGE UP (ref 0–2)
BILIRUB SERPL-MCNC: 0.4 MG/DL — SIGNIFICANT CHANGE UP (ref 0.2–1.2)
BILIRUB UR-MCNC: NEGATIVE — SIGNIFICANT CHANGE UP
BUN SERPL-MCNC: 33 MG/DL — HIGH (ref 7–18)
CALCIUM SERPL-MCNC: 8.2 MG/DL — LOW (ref 8.4–10.5)
CHLORIDE SERPL-SCNC: 100 MMOL/L — SIGNIFICANT CHANGE UP (ref 96–108)
CHOLEST SERPL-MCNC: 118 MG/DL — SIGNIFICANT CHANGE UP (ref 10–199)
CO2 SERPL-SCNC: 26 MMOL/L — SIGNIFICANT CHANGE UP (ref 22–31)
COLOR SPEC: YELLOW — SIGNIFICANT CHANGE UP
COMMENT - URINE: SIGNIFICANT CHANGE UP
CREAT SERPL-MCNC: 1.21 MG/DL — SIGNIFICANT CHANGE UP (ref 0.5–1.3)
DIFF PNL FLD: ABNORMAL
EOSINOPHIL # BLD AUTO: 0 K/UL — SIGNIFICANT CHANGE UP (ref 0–0.5)
EOSINOPHIL NFR BLD AUTO: 0 % — SIGNIFICANT CHANGE UP (ref 0–6)
EPI CELLS # UR: ABNORMAL /HPF
FOLATE SERPL-MCNC: 4.4 NG/ML — LOW
GLUCOSE SERPL-MCNC: 109 MG/DL — HIGH (ref 70–99)
GLUCOSE UR QL: NEGATIVE — SIGNIFICANT CHANGE UP
GRAM STN FLD: SIGNIFICANT CHANGE UP
HBA1C BLD-MCNC: 4.8 % — SIGNIFICANT CHANGE UP (ref 4–5.6)
HCT VFR BLD CALC: 33.1 % — LOW (ref 34.5–45)
HDLC SERPL-MCNC: 16 MG/DL — LOW
HGB BLD-MCNC: 11 G/DL — LOW (ref 11.5–15.5)
KETONES UR-MCNC: NEGATIVE — SIGNIFICANT CHANGE UP
LEUKOCYTE ESTERASE UR-ACNC: ABNORMAL
LIPID PNL WITH DIRECT LDL SERPL: 49 MG/DL — SIGNIFICANT CHANGE UP
LYMPHOCYTES # BLD AUTO: 0.21 K/UL — LOW (ref 1–3.3)
LYMPHOCYTES # BLD AUTO: 7 % — LOW (ref 13–44)
MAGNESIUM SERPL-MCNC: 1.8 MG/DL — SIGNIFICANT CHANGE UP (ref 1.6–2.6)
MCHC RBC-ENTMCNC: 31.2 PG — SIGNIFICANT CHANGE UP (ref 27–34)
MCHC RBC-ENTMCNC: 33.2 GM/DL — SIGNIFICANT CHANGE UP (ref 32–36)
MCV RBC AUTO: 93.8 FL — SIGNIFICANT CHANGE UP (ref 80–100)
METHOD TYPE: SIGNIFICANT CHANGE UP
MONOCYTES # BLD AUTO: 0.12 K/UL — SIGNIFICANT CHANGE UP (ref 0–0.9)
MONOCYTES NFR BLD AUTO: 4 % — SIGNIFICANT CHANGE UP (ref 2–14)
NEUTROPHILS # BLD AUTO: 2.47 K/UL — SIGNIFICANT CHANGE UP (ref 1.8–7.4)
NEUTROPHILS NFR BLD AUTO: 78 % — HIGH (ref 43–77)
NITRITE UR-MCNC: NEGATIVE — SIGNIFICANT CHANGE UP
PH UR: 5 — SIGNIFICANT CHANGE UP (ref 5–8)
PHOSPHATE SERPL-MCNC: 3 MG/DL — SIGNIFICANT CHANGE UP (ref 2.5–4.5)
PLATELET # BLD AUTO: 106 K/UL — LOW (ref 150–400)
POTASSIUM SERPL-MCNC: 3.4 MMOL/L — LOW (ref 3.5–5.3)
POTASSIUM SERPL-SCNC: 3.4 MMOL/L — LOW (ref 3.5–5.3)
PROT SERPL-MCNC: 6.1 G/DL — SIGNIFICANT CHANGE UP (ref 6–8.3)
PROT UR-MCNC: 100
RBC # BLD: 3.53 M/UL — LOW (ref 3.8–5.2)
RBC # FLD: 13.2 % — SIGNIFICANT CHANGE UP (ref 10.3–14.5)
RBC CASTS # UR COMP ASSIST: ABNORMAL /HPF (ref 0–2)
SODIUM SERPL-SCNC: 134 MMOL/L — LOW (ref 135–145)
SP GR SPEC: 1.01 — SIGNIFICANT CHANGE UP (ref 1.01–1.02)
SPECIMEN SOURCE: SIGNIFICANT CHANGE UP
SPECIMEN SOURCE: SIGNIFICANT CHANGE UP
TOTAL CHOLESTEROL/HDL RATIO MEASUREMENT: 7.4 RATIO — HIGH (ref 3.3–7.1)
TRIGL SERPL-MCNC: 263 MG/DL — HIGH (ref 10–149)
TROPONIN I SERPL-MCNC: <0.015 NG/ML — SIGNIFICANT CHANGE UP (ref 0–0.04)
TSH SERPL-MCNC: 0.93 UU/ML — SIGNIFICANT CHANGE UP (ref 0.34–4.82)
UROBILINOGEN FLD QL: NEGATIVE — SIGNIFICANT CHANGE UP
VIT B12 SERPL-MCNC: 1014 PG/ML — SIGNIFICANT CHANGE UP (ref 232–1245)
WBC # BLD: 2.98 K/UL — LOW (ref 3.8–10.5)
WBC # FLD AUTO: 2.98 K/UL — LOW (ref 3.8–10.5)
WBC UR QL: SIGNIFICANT CHANGE UP /HPF (ref 0–5)

## 2019-09-03 RX ORDER — METHADONE HYDROCHLORIDE 40 MG/1
55 TABLET ORAL
Qty: 0 | Refills: 0 | DISCHARGE

## 2019-09-03 RX ORDER — FOLIC ACID 0.8 MG
1 TABLET ORAL DAILY
Refills: 0 | Status: DISCONTINUED | OUTPATIENT
Start: 2019-09-03 | End: 2019-09-24

## 2019-09-03 RX ORDER — ALBUTEROL 90 UG/1
0 AEROSOL, METERED ORAL
Qty: 0 | Refills: 0 | DISCHARGE

## 2019-09-03 RX ORDER — METHADONE HYDROCHLORIDE 40 MG/1
55 TABLET ORAL DAILY
Refills: 0 | Status: DISCONTINUED | OUTPATIENT
Start: 2019-09-03 | End: 2019-09-03

## 2019-09-03 RX ORDER — SENNA PLUS 8.6 MG/1
1 TABLET ORAL AT BEDTIME
Refills: 0 | Status: DISCONTINUED | OUTPATIENT
Start: 2019-09-03 | End: 2019-09-14

## 2019-09-03 RX ORDER — POTASSIUM CHLORIDE 20 MEQ
40 PACKET (EA) ORAL ONCE
Refills: 0 | Status: COMPLETED | OUTPATIENT
Start: 2019-09-03 | End: 2019-09-03

## 2019-09-03 RX ORDER — DOCUSATE SODIUM 100 MG
100 CAPSULE ORAL DAILY
Refills: 0 | Status: DISCONTINUED | OUTPATIENT
Start: 2019-09-03 | End: 2019-09-14

## 2019-09-03 RX ORDER — TRAMADOL HYDROCHLORIDE 50 MG/1
50 TABLET ORAL EVERY 8 HOURS
Refills: 0 | Status: DISCONTINUED | OUTPATIENT
Start: 2019-09-03 | End: 2019-09-10

## 2019-09-03 RX ORDER — METHADONE HYDROCHLORIDE 40 MG/1
55 TABLET ORAL DAILY
Refills: 0 | Status: DISCONTINUED | OUTPATIENT
Start: 2019-09-03 | End: 2019-09-08

## 2019-09-03 RX ORDER — INFLUENZA VIRUS VACCINE 15; 15; 15; 15 UG/.5ML; UG/.5ML; UG/.5ML; UG/.5ML
0.5 SUSPENSION INTRAMUSCULAR ONCE
Refills: 0 | Status: DISCONTINUED | OUTPATIENT
Start: 2019-09-03 | End: 2019-09-24

## 2019-09-03 RX ADMIN — Medication 1 MILLIGRAM(S): at 12:21

## 2019-09-03 RX ADMIN — Medication 100 MILLIGRAM(S): at 21:55

## 2019-09-03 RX ADMIN — Medication 650 MILLIGRAM(S): at 19:37

## 2019-09-03 RX ADMIN — LORATADINE 10 MILLIGRAM(S): 10 TABLET ORAL at 11:22

## 2019-09-03 RX ADMIN — PIPERACILLIN AND TAZOBACTAM 25 GRAM(S): 4; .5 INJECTION, POWDER, LYOPHILIZED, FOR SOLUTION INTRAVENOUS at 06:28

## 2019-09-03 RX ADMIN — Medication 100 MILLIGRAM(S): at 06:27

## 2019-09-03 RX ADMIN — SODIUM CHLORIDE 100 MILLILITER(S): 9 INJECTION INTRAMUSCULAR; INTRAVENOUS; SUBCUTANEOUS at 08:20

## 2019-09-03 RX ADMIN — Medication 15 MILLIGRAM(S): at 06:27

## 2019-09-03 RX ADMIN — Medication 40 MILLIGRAM(S): at 11:22

## 2019-09-03 RX ADMIN — Medication 40 MILLIGRAM(S): at 17:09

## 2019-09-03 RX ADMIN — HEPARIN SODIUM 5000 UNIT(S): 5000 INJECTION INTRAVENOUS; SUBCUTANEOUS at 13:27

## 2019-09-03 RX ADMIN — Medication 15 MILLIGRAM(S): at 14:55

## 2019-09-03 RX ADMIN — Medication 15 MILLIGRAM(S): at 07:30

## 2019-09-03 RX ADMIN — PIPERACILLIN AND TAZOBACTAM 25 GRAM(S): 4; .5 INJECTION, POWDER, LYOPHILIZED, FOR SOLUTION INTRAVENOUS at 21:56

## 2019-09-03 RX ADMIN — METHADONE HYDROCHLORIDE 55 MILLIGRAM(S): 40 TABLET ORAL at 10:13

## 2019-09-03 RX ADMIN — Medication 100 MILLIGRAM(S): at 13:42

## 2019-09-03 RX ADMIN — Medication 40 MILLIEQUIVALENT(S): at 10:03

## 2019-09-03 RX ADMIN — Medication 650 MILLIGRAM(S): at 08:21

## 2019-09-03 RX ADMIN — Medication 650 MILLIGRAM(S): at 21:10

## 2019-09-03 RX ADMIN — TRAMADOL HYDROCHLORIDE 50 MILLIGRAM(S): 50 TABLET ORAL at 17:08

## 2019-09-03 RX ADMIN — Medication 15 MILLIGRAM(S): at 23:00

## 2019-09-03 RX ADMIN — Medication 650 MILLIGRAM(S): at 09:30

## 2019-09-03 RX ADMIN — Medication 15 MILLIGRAM(S): at 14:23

## 2019-09-03 RX ADMIN — Medication 3 MILLILITER(S): at 14:43

## 2019-09-03 RX ADMIN — TRAMADOL HYDROCHLORIDE 50 MILLIGRAM(S): 50 TABLET ORAL at 18:10

## 2019-09-03 RX ADMIN — Medication 3 MILLILITER(S): at 20:23

## 2019-09-03 RX ADMIN — PIPERACILLIN AND TAZOBACTAM 25 GRAM(S): 4; .5 INJECTION, POWDER, LYOPHILIZED, FOR SOLUTION INTRAVENOUS at 13:27

## 2019-09-03 RX ADMIN — Medication 15 MILLIGRAM(S): at 22:26

## 2019-09-03 RX ADMIN — Medication 40 MILLIGRAM(S): at 06:27

## 2019-09-03 NOTE — PROGRESS NOTE ADULT - PROBLEM SELECTOR PLAN 1
-P/w Cough, SOB, Chest pain since yesterday with 40 pack year history of smoking   - CXR: Opacification/consolidation of the right lower lung field. This most   likely represents pneumonia. A superimposed right pleural effusion cannot   be excluded.  CT chest: Soft tissue impaction of the right middle and lower lobe   bronchi. Extensive airspace consolidations of the right middle and lower   lobes. Overall findings may reflect severe multifocal pneumonia, however   close follow-up to in short resolution and exclude endobronchial mass.   Mild mediastinal adenopathy.  s/p : Rocephin, Azithromycin in Ed   - Will c/w zosyn  -Duo neb   -Blood culture shows Kliebsella pneumonia    -ID Dr Hopkins consulted  CT chesT: concern for malignancy. Dr Guo consulted. Patient may need Bronchoscopy vs IR guided Biopsy depending on Pulmonary recommendation

## 2019-09-03 NOTE — CONSULT NOTE ADULT - SUBJECTIVE AND OBJECTIVE BOX
HISTORY OF PRESENT ILLNESS: HPI:  53 yo female with PMH of Asthma, presented with SOB, cough and right sided chest pain since yesterday morning. She started feeling short of breath, present at rest, not relieved with Albuterol inhaler, unable to ambulate due to SOB since yesterday. It was associated with wheeze and cough with whitish sputum. She also complains of subjective fever. Chest pain is present on right lower side, 7/10 in severity, on/off, non radiating, stabbing in nature, worse with sitting and moving and lying on that side, relieved with Motrin.   THe pain is not exertional and was symptom free up until day of admission    Off note, she quit Heroin abuse on August 3rd and since then has been on Methadone 55mg OD. She goes to the Methadone clinic every day, 6 days a week for her Methadone dose. Clinic #: [phone]-Ext: [phone]. She has taken her dose for today. Primary team to call Clinic to confirm dose. Clinic was closed today. (02 Sep 2019 13:00)      PAST MEDICAL & SURGICAL HISTORY:  Asthma  No significant past surgical history          MEDICATIONS:  MEDICATIONS  (STANDING):  ALBUTerol/ipratropium for Nebulization 3 milliLiter(s) Nebulizer every 6 hours  docusate sodium 100 milliGRAM(s) Oral daily  folic acid 1 milliGRAM(s) Oral daily  heparin  Injectable 5000 Unit(s) SubCutaneous every 8 hours  influenza   Vaccine 0.5 milliLiter(s) IntraMuscular once  loratadine 10 milliGRAM(s) Oral daily  methadone    Tablet 55 milliGRAM(s) Oral daily  methylPREDNISolone sodium succinate Injectable 40 milliGRAM(s) IV Push every 12 hours  piperacillin/tazobactam IVPB. 3.375 Gram(s) IV Intermittent once  piperacillin/tazobactam IVPB.. 3.375 Gram(s) IV Intermittent every 8 hours  senna 1 Tablet(s) Oral at bedtime  sodium chloride 0.9%. 1000 milliLiter(s) (100 mL/Hr) IV Continuous <Continuous>      Allergies    No Known Allergies    Intolerances        FAMILY HISTORY:  No pertinent family history in first degree relatives    Non-contributary for premature coronary disease or sudden cardiac death    SOCIAL HISTORY:    [ ] Non-smoker  [ ] Smoker  [ ] Alcohol      REVIEW OF SYSTEMS:  [ ]chest pain  [  ]shortness of breath  [  ]palpitations  [  ]syncope  [ ]near syncope [ ]upper extremity weakness   [ ] lower extremity weakness  [  ]diplopia  [  ]altered mental status   [  ]fevers  [ ]chills [ ]nausea  [ ]vomitting  [  ]dysphagia    [ ]abdominal pain  [ ]melena  [ ]BRBPR    [  ]epistaxis  [  ]rash    [ ]lower extremity edema        [ ] All others negative	  [ ] Unable to obtain      LABS:	 	    CARDIAC MARKERS:  CARDIAC MARKERS ( 03 Sep 2019 06:47 )  <0.015 ng/mL / x     / x     / x     / x      CARDIAC MARKERS ( 02 Sep 2019 16:58 )  <0.015 ng/mL / x     / x     / x     / x      CARDIAC MARKERS ( 02 Sep 2019 09:20 )  <0.015 ng/mL / x     / x     / x     / x                                  11.0   2.98  )-----------( 106      ( 03 Sep 2019 06:47 )             33.1     Hb Trend: 11.0<--    09-03    134<L>  |  100  |  33<H>  ----------------------------<  109<H>  3.4<L>   |  26  |  1.21    Ca    8.2<L>      03 Sep 2019 06:47  Phos  3.0     09-03  Mg     1.8     09-03    TPro  6.1  /  Alb  2.1<L>  /  TBili  0.4  /  DBili  x   /  AST  52<H>  /  ALT  26  /  AlkPhos  42  09-03    Creatinine Trend: 1.21<--, 1.00<--    Coags:      proBNP:   Lipid Profile:   HgA1c: Hemoglobin A1C, Whole Blood: 4.8 % (09-03 @ 09:52)    TSH: Thyroid Stimulating Hormone, Serum: 0.93 uU/mL (09-03 @ 06:47)          PHYSICAL EXAM:  T(C): 36.4 (09-03-19 @ 15:17), Max: 37.1 (09-03-19 @ 11:09)  HR: 86 (09-03-19 @ 15:17) (81 - 107)  BP: 96/49 (09-03-19 @ 15:17) (89/62 - 100/54)  RR: 18 (09-03-19 @ 15:17) (18 - 22)  SpO2: 94% (09-03-19 @ 15:17) (94% - 99%)  Wt(kg): --  I&O's Summary    03 Sep 2019 07:01  -  03 Sep 2019 16:29  --------------------------------------------------------  IN: 430 mL / OUT: 0 mL / NET: 430 mL        Gen: Appears well in NAD  HEENT:  (-)icterus (-)pallor  CV: N S1 S2 1/6 ERYN (+)2 Pulses B/l  Resp:  Clear to ausculatation B/L, normal effort  GI: (+) BS Soft, NT, ND  Lymph:  (-)Edema, (-)obvious lymphadenopathy  Skin: Warm to touch, Normal turgor  Psych: Appropriate mood and affect        TELEMETRY: 	  Sinus    ECG:  	Sinus tach 120 BPM, nonspecific T wave abnormality    RADIOLOGY:         CXR:   < from: Xray Chest 1 View-PORTABLE IMMEDIATE (09.02.19 @ 09:32) >  Opacification/consolidation of the right lower lung field. This most   likely represents pneumonia. A superimposed right pleural effusion cannot   be excluded.      ASSESSMENT/PLAN: 	54y Female  PMH of Asthma, presented with SOB, cough and right sided chest pain since yesterday morning. She started feeling short of breath, present at rest, not relieved with Albuterol inhaler, unable to ambulate due to SOB since yesterday. It was associated with wheeze and cough with whitish sputum found with right lower lobe consolidation and klebsiella bacteremia.    - Suspect symptoms due to PNA  - check echo for completeness  - Abx per ID  - Will follow with you    I once again thank you for allowing me to participate in the care of your patient.  If you have any questions or concerns please do not hesitate to contact me.    Rashid Roberts MD, [facility]  BEEPER [phone] HISTORY OF PRESENT ILLNESS: HPI:  53 yo female with PMH of Asthma, presented with SOB, cough and right sided chest pain since yesterday morning. She started feeling short of breath, present at rest, not relieved with Albuterol inhaler, unable to ambulate due to SOB since yesterday. It was associated with wheeze and cough with whitish sputum. She also complains of subjective fever. Chest pain is present on right lower side, 7/10 in severity, on/off, non radiating, stabbing in nature, worse with sitting and moving and lying on that side, relieved with Motrin.   THe pain is not exertional and was symptom free up until day of admission    Off note, she quit Heroin abuse on August 3rd and since then has been on Methadone 55mg OD. She goes to the Methadone clinic every day, 6 days a week for her Methadone dose. Clinic #: [phone]-Ext: [phone]. She has taken her dose for today. Primary team to call Clinic to confirm dose. Clinic was closed today. (02 Sep 2019 13:00)      PAST MEDICAL & SURGICAL HISTORY:  Asthma  No significant past surgical history          MEDICATIONS:  MEDICATIONS  (STANDING):  ALBUTerol/ipratropium for Nebulization 3 milliLiter(s) Nebulizer every 6 hours  docusate sodium 100 milliGRAM(s) Oral daily  folic acid 1 milliGRAM(s) Oral daily  heparin  Injectable 5000 Unit(s) SubCutaneous every 8 hours  influenza   Vaccine 0.5 milliLiter(s) IntraMuscular once  loratadine 10 milliGRAM(s) Oral daily  methadone    Tablet 55 milliGRAM(s) Oral daily  methylPREDNISolone sodium succinate Injectable 40 milliGRAM(s) IV Push every 12 hours  piperacillin/tazobactam IVPB. 3.375 Gram(s) IV Intermittent once  piperacillin/tazobactam IVPB.. 3.375 Gram(s) IV Intermittent every 8 hours  senna 1 Tablet(s) Oral at bedtime  sodium chloride 0.9%. 1000 milliLiter(s) (100 mL/Hr) IV Continuous <Continuous>      Allergies    No Known Allergies    Intolerances        FAMILY HISTORY:  No pertinent family history in first degree relatives    Non-contributary for premature coronary disease or sudden cardiac death    SOCIAL HISTORY:    [ ] Non-smoker  [X ] Smoker  [ ] Alcohol      REVIEW OF SYSTEMS:  [ ]chest pain  [  ]shortness of breath  [  ]palpitations  [  ]syncope  [ ]near syncope [ ]upper extremity weakness   [ ] lower extremity weakness  [  ]diplopia  [  ]altered mental status   [  ]fevers  [ ]chills [ ]nausea  [ ]vomitting  [  ]dysphagia    [ ]abdominal pain  [ ]melena  [ ]BRBPR    [  ]epistaxis  [  ]rash    [ ]lower extremity edema        [ ] All others negative	  [ ] Unable to obtain      LABS:	 	    CARDIAC MARKERS:  CARDIAC MARKERS ( 03 Sep 2019 06:47 )  <0.015 ng/mL / x     / x     / x     / x      CARDIAC MARKERS ( 02 Sep 2019 16:58 )  <0.015 ng/mL / x     / x     / x     / x      CARDIAC MARKERS ( 02 Sep 2019 09:20 )  <0.015 ng/mL / x     / x     / x     / x                                  11.0   2.98  )-----------( 106      ( 03 Sep 2019 06:47 )             33.1     Hb Trend: 11.0<--    09-03    134<L>  |  100  |  33<H>  ----------------------------<  109<H>  3.4<L>   |  26  |  1.21    Ca    8.2<L>      03 Sep 2019 06:47  Phos  3.0     09-03  Mg     1.8     09-03    TPro  6.1  /  Alb  2.1<L>  /  TBili  0.4  /  DBili  x   /  AST  52<H>  /  ALT  26  /  AlkPhos  42  09-03    Creatinine Trend: 1.21<--, 1.00<--    Coags:      proBNP:   Lipid Profile:   HgA1c: Hemoglobin A1C, Whole Blood: 4.8 % (09-03 @ 09:52)    TSH: Thyroid Stimulating Hormone, Serum: 0.93 uU/mL (09-03 @ 06:47)          PHYSICAL EXAM:  T(C): 36.4 (09-03-19 @ 15:17), Max: 37.1 (09-03-19 @ 11:09)  HR: 86 (09-03-19 @ 15:17) (81 - 107)  BP: 96/49 (09-03-19 @ 15:17) (89/62 - 100/54)  RR: 18 (09-03-19 @ 15:17) (18 - 22)  SpO2: 94% (09-03-19 @ 15:17) (94% - 99%)  Wt(kg): --  I&O's Summary    03 Sep 2019 07:01  -  03 Sep 2019 16:29  --------------------------------------------------------  IN: 430 mL / OUT: 0 mL / NET: 430 mL        Gen: Appears well in NAD  HEENT:  (-)icterus (-)pallor  CV: N S1 S2 1/6 ERYN (+)2 Pulses B/l  Resp:  Clear to ausculatation B/L, normal effort  GI: (+) BS Soft, NT, ND  Lymph:  (-)Edema, (-)obvious lymphadenopathy  Skin: Warm to touch, Normal turgor  Psych: Appropriate mood and affect        TELEMETRY: 	  Sinus    ECG:  	Sinus tach 120 BPM, nonspecific T wave abnormality    RADIOLOGY:         CXR:   < from: Xray Chest 1 View-PORTABLE IMMEDIATE (09.02.19 @ 09:32) >  Opacification/consolidation of the right lower lung field. This most   likely represents pneumonia. A superimposed right pleural effusion cannot   be excluded.      ASSESSMENT/PLAN: 	54y Female  PMH of Asthma, presented with SOB, cough and right sided chest pain since yesterday morning. She started feeling short of breath, present at rest, not relieved with Albuterol inhaler, unable to ambulate due to SOB since yesterday. It was associated with wheeze and cough with whitish sputum found with right lower lobe consolidation and klebsiella bacteremia.    - Suspect symptoms due to PNA  - check echo for completeness  - Abx per ID  - Smoking cessation stressed  - Will follow with you    I once again thank you for allowing me to participate in the care of your patient.  If you have any questions or concerns please do not hesitate to contact me.    Rashid Roberts MD, [facility]  BEEPER [phone]

## 2019-09-03 NOTE — PROGRESS NOTE ADULT - PROBLEM SELECTOR PLAN 3
p/w Right side chest pain, worse with ambulation, and lying on right side.  Tenderness present on palpation. CXR; right sided PNa   -EKG: no acute changes  -trops negative  -most likely due to costochondritis, Toradol for inflammatory pain   -c/w tele   -f/u echo

## 2019-09-03 NOTE — PROGRESS NOTE ADULT - ASSESSMENT
55 yo female with PMH of Asthma, presented with SOB, cough and right sided chest pain since yesterday morning. She started feeling short of breath, present at rest, not relieved with Albuterol inhaler, unable to ambulate due to SOB since yesterday. It was associated with wheeze and cough with whitish sputum. She also complains of subjective fever. Chest pain is present on right lower side, 7/10 in severity, on/off, non radiating, stabbing in nature, worse with sitting and moving and lying on that side, relieved with Motrin.     Admitted for Right sided PNA, Asthma exacerbation and concern for Malignancy     GOC; Full code

## 2019-09-03 NOTE — PROGRESS NOTE ADULT - PROBLEM SELECTOR PLAN 2
p/w SOB with  wheezing   s/p :  Duo neb, Solu poli, Mg So4 in Ed  -CXR, CT chest; as above   -peak flow: 110 in ED   - C/w solu medrol 40 q12, Riley Guo consulted.

## 2019-09-03 NOTE — PROGRESS NOTE ADULT - SUBJECTIVE AND OBJECTIVE BOX
PGY 1 Note discussed with supervising resident and primary attending    Patient is a 54y old  Female who presents with a chief complaint of SOB, Right sided CP, cough since yesterday morning (03 Sep 2019 12:56)      INTERVAL HPI/OVERNIGHT EVENTS: Patient seen and examined at the bedside. Pt stated she has pain in right side of chest which increases when she coughs. Pt also asking for her methadone for details of treatment center taken from the patient.    MEDICATIONS  (STANDING):  ALBUTerol/ipratropium for Nebulization 3 milliLiter(s) Nebulizer every 6 hours  folic acid 1 milliGRAM(s) Oral daily  heparin  Injectable 5000 Unit(s) SubCutaneous every 8 hours  influenza   Vaccine 0.5 milliLiter(s) IntraMuscular once  loratadine 10 milliGRAM(s) Oral daily  methadone    Tablet 55 milliGRAM(s) Oral daily  methylPREDNISolone sodium succinate Injectable 40 milliGRAM(s) IV Push every 6 hours  piperacillin/tazobactam IVPB. 3.375 Gram(s) IV Intermittent once  piperacillin/tazobactam IVPB.. 3.375 Gram(s) IV Intermittent every 8 hours  sodium chloride 0.9%. 1000 milliLiter(s) (100 mL/Hr) IV Continuous <Continuous>    MEDICATIONS  (PRN):  acetaminophen   Tablet .. 650 milliGRAM(s) Oral every 6 hours PRN Temp greater or equal to 38C (100.4F), Mild Pain (1 - 3)  ALBUTerol/ipratropium for Nebulization 3 milliLiter(s) Nebulizer every 8 hours PRN Bronchospasm  guaiFENesin    Syrup 100 milliGRAM(s) Oral every 6 hours PRN Cough  ketorolac   Injectable 15 milliGRAM(s) IV Push every 8 hours PRN Moderate Pain (4 - 6)      __________________________________________________  REVIEW OF SYSTEMS:    CONSTITUTIONAL: No fever,   EYES: no acute visual disturbances  NECK: No pain or stiffness  RESPIRATORY: +cough; +shortness of breath  CARDIOVASCULAR: +chest pain, no palpitations  GASTROINTESTINAL: No pain. No nausea or vomiting; No diarrhea   NEUROLOGICAL: No headache or numbness, no tremors  MUSCULOSKELETAL: No joint pain, no muscle pain  GENITOURINARY: no dysuria, no frequency, no hesitancy  PSYCHIATRY: no depression , no anxiety  ALL OTHER  ROS negative        Vital Signs Last 24 Hrs  T(C): 37.1 (03 Sep 2019 11:09), Max: 37.1 (02 Sep 2019 15:00)  T(F): 98.7 (03 Sep 2019 11:09), Max: 98.8 (02 Sep 2019 15:00)  HR: 85 (03 Sep 2019 11:09) (81 - 110)  BP: 100/54 (03 Sep 2019 11:09) (89/62 - 103/68)  BP(mean): 75 (02 Sep 2019 15:00) (75 - 75)  RR: 18 (03 Sep 2019 11:09) (18 - 22)  SpO2: 98% (03 Sep 2019 11:09) (94% - 99%)    ________________________________________________  PHYSICAL EXAM:  GENERAL: NAD, sititng in bed   HEENT: Normocephalic;  conjunctivae and sclerae clear; moist mucous membranes;   NECK : supple  CHEST/LUNG: Clear to auscultation bilaterally, decreased breath sounds on right base    HEART: S1 S2  regular; no murmurs, gallops or rubs  ABDOMEN: Soft, Nontender, Nondistended; Bowel sounds present  EXTREMITIES: no cyanosis; no edema; no calf tenderness  SKIN: warm and dry; no rash  NERVOUS SYSTEM:  Awake and alert; Oriented  to place, person and time ; no new deficits    _________________________________________________  LABS:                        11.0   2.98  )-----------( 106      ( 03 Sep 2019 06:47 )             33.1     09-03    134<L>  |  100  |  33<H>  ----------------------------<  109<H>  3.4<L>   |  26  |  1.21    Ca    8.2<L>      03 Sep 2019 06:47  Phos  3.0     09-03  Mg     1.8     09-03    TPro  6.1  /  Alb  2.1<L>  /  TBili  0.4  /  DBili  x   /  AST  52<H>  /  ALT  26  /  AlkPhos  42  09-03        CAPILLARY BLOOD GLUCOSE            RADIOLOGY & ADDITIONAL TESTS:    < from: CT Chest No Cont (09.02.19 @ 12:33) >  CHEST WALL AND LOWER NECK: Within normal limits  MEDIASTINUM AND GÓMEZ: Mild mediastinal and 2.2 cm subtracheal adenopathy.  HEART: Within normal limits  VESSELS: Within normal limits  LUNG AND LARGE AIRWAYS: Complete opacification of the right middle and   right lower lobe bronchi. Dense confluent airspace consolidation of the   right middle and right lower lobes. Patchy and tree-in-bud airspace   opacities right upper lobe. Paraseptal emphysema. Trace right pleural   effusion.  VISUALIZED UPPER ABDOMEN: Within normal limits.  BONES: Multiple old left-sided rib fractures.    IMPRESSION: Soft tissue impaction of the right middle and lower lobe   bronchi. Extensive airspace consolidations ofthe right middle and lower   lobes. Overall findings may reflect severe multifocal pneumonia, however   close follow-up to in short resolution and exclude endobronchial mass.   Mild mediastinal adenopathy.            < end of copied text >    Imaging Personally Reviewed:  YES/NO    Consultant(s) Notes Reviewed:   YES/ No    Care Discussed with Consultants :     Plan of care was discussed with patient and /or primary care giver; all questions and concerns were addressed and care was aligned with patient's wishes.

## 2019-09-03 NOTE — PROGRESS NOTE ADULT - PROBLEM SELECTOR PLAN 5
She quit Heroin abuse on August 3rd and since then has been on Methadone 55mg OD. She goes to the Methadone clinic every day, 6 days a week for her Methadone dose. Clinic #: [phone]-Ext: [phone].   Clinic was called and dose verified. Started on methadone 55mg q d today

## 2019-09-03 NOTE — CONSULT NOTE ADULT - ASSESSMENT
1.	Multifocal pneumonia   2.	Bacteremia  3.	?UTI  ·	will need U/A and UC to be done  ·	recommend HIV testing  ·	cont zosyn 3.375gm IV q8h  D 2 1.	Multifocal pneumonia   2.	Bacteremia - likely source being a UTI  3.	UTI  ·	will need U/A and UC to be done  ·	recommend HIV testing  ·	cont zosyn 3.375gm IV q8h  D 2

## 2019-09-03 NOTE — PROGRESS NOTE ADULT - SUBJECTIVE AND OBJECTIVE BOX
Time of Visit:  Patient seen and examined.     MEDICATIONS  (STANDING):  ALBUTerol/ipratropium for Nebulization 3 milliLiter(s) Nebulizer every 6 hours  docusate sodium 100 milliGRAM(s) Oral daily  folic acid 1 milliGRAM(s) Oral daily  heparin  Injectable 5000 Unit(s) SubCutaneous every 8 hours  influenza   Vaccine 0.5 milliLiter(s) IntraMuscular once  loratadine 10 milliGRAM(s) Oral daily  methadone    Tablet 55 milliGRAM(s) Oral daily  methylPREDNISolone sodium succinate Injectable 40 milliGRAM(s) IV Push every 12 hours  piperacillin/tazobactam IVPB. 3.375 Gram(s) IV Intermittent once  piperacillin/tazobactam IVPB.. 3.375 Gram(s) IV Intermittent every 8 hours  senna 1 Tablet(s) Oral at bedtime  sodium chloride 0.9%. 1000 milliLiter(s) (100 mL/Hr) IV Continuous <Continuous>      MEDICATIONS  (PRN):  acetaminophen   Tablet .. 650 milliGRAM(s) Oral every 6 hours PRN Temp greater or equal to 38C (100.4F), Mild Pain (1 - 3)  ALBUTerol/ipratropium for Nebulization 3 milliLiter(s) Nebulizer every 8 hours PRN Bronchospasm  guaiFENesin    Syrup 100 milliGRAM(s) Oral every 6 hours PRN Cough  ketorolac   Injectable 15 milliGRAM(s) IV Push every 8 hours PRN Moderate Pain (4 - 6)  traMADol 50 milliGRAM(s) Oral every 8 hours PRN Severe Pain (7 - 10)       Medications up to date at time of exam.      PHYSICAL EXAMINATION:  Patient has no new complaints.  GENERAL: The patient is a well-developed, well-nourished, in no apparent distress.     Vital Signs Last 24 Hrs  T(C): 36.6 (03 Sep 2019 19:54), Max: 37.1 (03 Sep 2019 11:09)  T(F): 97.8 (03 Sep 2019 19:54), Max: 98.7 (03 Sep 2019 11:09)  HR: 82 (03 Sep 2019 19:54) (72 - 105)  BP: 92/59 (03 Sep 2019 19:54) (90/61 - 100/54)  BP(mean): --  RR: 18 (03 Sep 2019 19:54) (18 - 22)  SpO2: 91% (03 Sep 2019 19:54) (91% - 99%)   (if applicable)    Chest Tube (if applicable)    HEENT: Head is normocephalic and atraumatic. Extraocular muscles are intact. Mucous membranes are moist.     NECK: Supple, no palpable adenopathy.    LUNGS: Clear to auscultation, no wheezing, rales, or rhonchi.    HEART: Regular rate and rhythm without murmur.    ABDOMEN: Soft, nontender, and nondistended.  No hepatosplenomegaly is noted.    : No painful voiding, no pelvic pain    EXTREMITIES: Without any cyanosis, clubbing, rash, lesions or edema.    NEUROLOGIC: Awake, alert, oriented, grossly intact    SKIN: Warm, dry, good turgor.      LABS:                        11.0   2.98  )-----------( 106      ( 03 Sep 2019 06:47 )             33.1     09-03    134<L>  |  100  |  33<H>  ----------------------------<  109<H>  3.4<L>   |  26  |  1.21    Ca    8.2<L>      03 Sep 2019 06:47  Phos  3.0     -  Mg     1.8     -    TPro  6.1  /  Alb  2.1<L>  /  TBili  0.4  /  DBili  x   /  AST  52<H>  /  ALT  26  /  AlkPhos  42  -      Urinalysis Basic - ( 03 Sep 2019 17:19 )    Color: Yellow / Appearance: Clear / S.015 / pH: x  Gluc: x / Ketone: Negative  / Bili: Negative / Urobili: Negative   Blood: x / Protein: 100 / Nitrite: Negative   Leuk Esterase: Trace / RBC: 5-10 /HPF / WBC 3-5 /HPF   Sq Epi: x / Non Sq Epi: Moderate /HPF / Bacteria: Moderate /HPF        CARDIAC MARKERS ( 03 Sep 2019 06:47 )  <0.015 ng/mL / x     / x     / x     / x      CARDIAC MARKERS ( 02 Sep 2019 16:58 )  <0.015 ng/mL / x     / x     / x     / x      CARDIAC MARKERS ( 02 Sep 2019 09:20 )  <0.015 ng/mL / x     / x     / x     / x                    MICROBIOLOGY: (if applicable)    RADIOLOGY & ADDITIONAL STUDIES:  EKG:   CXR:  ECHO:    IMPRESSION: 54y Female PAST MEDICAL & SURGICAL HISTORY:  Asthma  No significant past surgical history   p/w           IMP: This is 54 yr old woman active  smoker ( 40 pack years)  on methadone for heroine use admitted with pna.  CT chest show RML bronchus obstruction with mediastinal adenopathy may be due to pan vs malignancy          RECOMMENDATIONS:  -continue antibx  -f/u cultures  -HIV testing  -methadone  -dupnebs q6h  -may need bronchoscopy if no improvement  -dvt/gi prophy  -prednisone 20 mg daily x 5 days  -advise to stop smoking  -out pat pul f/u  -nicotine patch

## 2019-09-03 NOTE — CONSULT NOTE ADULT - SUBJECTIVE AND OBJECTIVE BOX
HPI:  53 yo female with PMH of Asthma, presented with SOB, cough and right sided chest pain since yesterday morning. She started feeling short of breath, present at rest, not relieved with Albuterol inhaler, unable to ambulate due to SOB since yesterday. It was associated with wheeze and cough with whitish sputum. She also complains of subjective fever. Chest pain is present on right lower side, 7/10 in severity, on/off, non radiating, stabbing in nature, worse with sitting and moving and lying on that side, relieved with Motrin.     Off note, she quit Heroin abuse on August 3rd and since then has been on Methadone 55mg OD. She goes to the Methadone clinic every day, 6 days a week for her Methadone dose. Clinic #: [phone]-Ext: [phone]. She has taken her dose for today. Primary team to call Clinic to confirm dose. Clinic was closed today. (02 Sep 2019 13:00)    REVIEW OF SYSTEMS:  [  ] Not able to illicit  General:	  Chest:	  GI:	  :  Skin:	  Musculoskeletal:	  Neuro:    PAST MEDICAL & SURGICAL HISTORY:  Asthma  No significant past surgical history    ALLERGIES: No Known Allergies    MEDS:  acetaminophen   Tablet .. 650 milliGRAM(s) Oral every 6 hours PRN  ALBUTerol/ipratropium for Nebulization 3 milliLiter(s) Nebulizer every 8 hours PRN  ALBUTerol/ipratropium for Nebulization 3 milliLiter(s) Nebulizer every 6 hours  folic acid 1 milliGRAM(s) Oral daily  guaiFENesin    Syrup 100 milliGRAM(s) Oral every 6 hours PRN  heparin  Injectable 5000 Unit(s) SubCutaneous every 8 hours  influenza   Vaccine 0.5 milliLiter(s) IntraMuscular once  ketorolac   Injectable 15 milliGRAM(s) IV Push every 8 hours PRN  loratadine 10 milliGRAM(s) Oral daily  methadone    Tablet 55 milliGRAM(s) Oral daily  methylPREDNISolone sodium succinate Injectable 40 milliGRAM(s) IV Push every 6 hours  piperacillin/tazobactam IVPB. 3.375 Gram(s) IV Intermittent once  piperacillin/tazobactam IVPB.. 3.375 Gram(s) IV Intermittent every 8 hours  sodium chloride 0.9%. 1000 milliLiter(s) IV Continuous <Continuous>    SOCIAL HISTORY:  Smoker:      FAMILY HISTORY:  No pertinent family history in first degree relatives    VITALS:  Vital Signs Last 24 Hrs  T(C): 37.1 (03 Sep 2019 11:09), Max: 37.1 (02 Sep 2019 15:00)  T(F): 98.7 (03 Sep 2019 11:09), Max: 98.8 (02 Sep 2019 15:00)  HR: 85 (03 Sep 2019 11:09) (81 - 110)  BP: 100/54 (03 Sep 2019 11:09) (89/62 - 103/68)  BP(mean): 75 (02 Sep 2019 15:00) (75 - 75)  RR: 18 (03 Sep 2019 11:09) (18 - 22)  SpO2: 98% (03 Sep 2019 11:09) (94% - 99%)      PHYSICAL EXAM:  Constitutional:  HEENT:  Neck:  Respiratory:  Cardiovascular:  Gastrointestinal:  Extremities:  Skin:  Ortho:  Neuro:      LABS/DIAGNOSTIC TESTS:                        11.0   2.98  )-----------( 106      ( 03 Sep 2019 06:47 )             33.1     WBC Count: 2.98 K/uL (09-03 @ 06:47)  WBC Count: 2.20 K/uL (09-02 @ 09:20)    09-03    134<L>  |  100  |  33<H>  ----------------------------<  109<H>  3.4<L>   |  26  |  1.21    Ca    8.2<L>      03 Sep 2019 06:47  Phos  3.0     09-03  Mg     1.8     09-03    TPro  6.1  /  Alb  2.1<L>  /  TBili  0.4  /  DBili  x   /  AST  52<H>  /  ALT  26  /  AlkPhos  42  09-03      LIVER FUNCTIONS - ( 03 Sep 2019 06:47 )  Alb: 2.1 g/dL / Pro: 6.1 g/dL / ALK PHOS: 42 U/L / ALT: 26 U/L DA / AST: 52 U/L / GGT: x               ABG -     CULTURES:   .Blood  09-03 @ 01:11   Growth in aerobic bottle: Gram Negative Rods  Growth in anaerobic bottle: Gram Negative Rods  "Due to technical problems, Proteus sp. will Not be reported as part of  the BCID panel until further notice"  ***Blood Panel PCR results on this specimenare available  approximately 3 hours after the Gram stain result.***  Gram stain, PCR, and/or culture results may not always  correspond due to difference in methodologies.  ************************************************************  This PCR assaywas performed using [employer].  The following targets are tested for: Enterococcus,  vancomycin resistant enterococci, Listeria monocytogenes,  coagulase negative staphylococci, S. aureus,  methicillin resistant S. aureus, Streptococcus agalactiae  (Group B), S. pneumoniae, S. pyogenes (Group A),  Acinetobacter baumannii, Enterobacter cloacae, E. coli,  Klebsiella oxytoca, K. pneumoniae, Proteus sp.,  Serratia marcescens, Haemophilus influenzae,  Neisseria meningitidis, Pseudomonas aeruginosa, Candida  albicans, C. glabrata, C krusei, C parapsilosis,  C. tropicalis and the KPC resistance gene.  --  Blood Culture PCR          RADIOLOGY: HPI:  ID consult was called to evaluate this patient for right sided multifocal pneumonia, bacteremia and leukopenia.     As per H&P:  53 yo female with PMH of Asthma, presented with SOB, cough and right sided chest pain since yesterday morning. She started feeling short of breath, present at rest, not relieved with Albuterol inhaler, unable to ambulate due to SOB since yesterday. It was associated with wheeze and cough with whitish sputum. She also complains of subjective fever. Chest pain is present on right lower side, 7/10 in severity, on/off, non radiating, stabbing in nature, worse with sitting and moving and lying on that side, relieved with Motrin. Off note, she quit Heroin abuse on August 3rd and since then has been on Methadone 55mg OD. She goes to the Methadone clinic every day, 6 days a week for her Methadone dose. Clinic #: [phone]-Ext: [phone]. She has taken her dose for today. Primary team to call Clinic to confirm dose. Clinic was closed today. (02 Sep 2019 13:00)    REVIEW OF SYSTEMS:  [  ] Not able to illicit  General: no fevers no malaise  Chest: +cough +improved sob  GI: no nvd  : no urinary sxs   Skin: no rashes  Musculoskeletal: no trauma no LBP  Neuro: no ha's no dizziness     PAST MEDICAL & SURGICAL HISTORY:  Asthma  No significant past surgical history    ALLERGIES: No Known Allergies    MEDS:  acetaminophen   Tablet .. 650 milliGRAM(s) Oral every 6 hours PRN  ALBUTerol/ipratropium for Nebulization 3 milliLiter(s) Nebulizer every 8 hours PRN  ALBUTerol/ipratropium for Nebulization 3 milliLiter(s) Nebulizer every 6 hours  folic acid 1 milliGRAM(s) Oral daily  guaiFENesin    Syrup 100 milliGRAM(s) Oral every 6 hours PRN  heparin  Injectable 5000 Unit(s) SubCutaneous every 8 hours  influenza   Vaccine 0.5 milliLiter(s) IntraMuscular once  ketorolac   Injectable 15 milliGRAM(s) IV Push every 8 hours PRN  loratadine 10 milliGRAM(s) Oral daily  methadone    Tablet 55 milliGRAM(s) Oral daily  methylPREDNISolone sodium succinate Injectable 40 milliGRAM(s) IV Push every 6 hours  piperacillin/tazobactam IVPB. 3.375 Gram(s) IV Intermittent once  piperacillin/tazobactam IVPB.. 3.375 Gram(s) IV Intermittent every 8 hours  sodium chloride 0.9%. 1000 milliLiter(s) IV Continuous <Continuous>    SOCIAL HISTORY:  Smoker:  active x 33 yrs    FAMILY HISTORY:  No pertinent family history in first degree relatives    VITALS:  Vital Signs Last 24 Hrs  T(C): 37.1 (03 Sep 2019 11:09), Max: 37.1 (02 Sep 2019 15:00)  T(F): 98.7 (03 Sep 2019 11:09), Max: 98.8 (02 Sep 2019 15:00)  HR: 85 (03 Sep 2019 11:09) (81 - 110)  BP: 100/54 (03 Sep 2019 11:09) (89/62 - 103/68)  BP(mean): 75 (02 Sep 2019 15:00) (75 - 75)  RR: 18 (03 Sep 2019 11:09) (18 - 22)  SpO2: 98% (03 Sep 2019 11:09) (94% - 99%)      PHYSICAL EXAM:  Constitutional: middle aged female with poor hygiene  HEENT: normocephalic with dry oral mucosa  Neck: supple no LN's no JVD  Respiratory: diffuse right sided rales no rhonchi  Cardiovascular: S1 S2 reg no murmurs  Gastrointestinal: +BS with soft, nondistended abdomen; nontender  Extremities: no edema no cyanosis  Skin: scattered hyperpigmented lesions on face  Ortho: no jt swelling  Neuro: AAO x 3      LABS/DIAGNOSTIC TESTS:                        11.0   2.98  )-----------( 106      ( 03 Sep 2019 06:47 )             33.1     WBC Count: 2.98 K/uL (09-03 @ 06:47)  WBC Count: 2.20 K/uL (09-02 @ 09:20)    09-03    134<L>  |  100  |  33<H>  ----------------------------<  109<H>  3.4<L>   |  26  |  1.21    Ca    8.2<L>      03 Sep 2019 06:47  Phos  3.0     09-03  Mg     1.8     09-03    TPro  6.1  /  Alb  2.1<L>  /  TBili  0.4  /  DBili  x   /  AST  52<H>  /  ALT  26  /  AlkPhos  42  09-03    LIVER FUNCTIONS - ( 03 Sep 2019 06:47 )  Alb: 2.1 g/dL / Pro: 6.1 g/dL / ALK PHOS: 42 U/L / ALT: 26 U/L DA / AST: 52 U/L / GGT: x             CULTURES:   .Blood  09-03 @ 01:11   Growth in aerobic bottle: Gram Negative Rods  Growth in anaerobic bottle: Gram Negative Rods  "Due to technical problems, Proteus sp. will Not be reported as part of  the BCID panel until further notice"  ***Blood Panel PCR results on this specimenare available  approximately 3 hours after the Gram stain result.***  Gram stain, PCR, and/or culture results may not always  correspond due to difference in methodologies.  ************************************************************  This PCR assaywas performed using [employer].  The following targets are tested for: Enterococcus,  vancomycin resistant enterococci, Listeria monocytogenes,  coagulase negative staphylococci, S. aureus,  methicillin resistant S. aureus, Streptococcus agalactiae  (Group B), S. pneumoniae, S. pyogenes (Group A),  Acinetobacter baumannii, Enterobacter cloacae, E. coli,  Klebsiella oxytoca, K. pneumoniae, Proteus sp.,  Serratia marcescens, Haemophilus influenzae,  Neisseria meningitidis, Pseudomonas aeruginosa, Candida  albicans, C. glabrata, C krusei, C parapsilosis,  C. tropicalis and the KPC resistance gene.  --  Blood Culture PCR      RADIOLOGY:  < from: CT Chest No Cont (09.02.19 @ 12:33) >  EXAM:  CT CHEST                            PROCEDURE DATE:  09/02/2019          INTERPRETATION:  CLINICAL INFORMATION: Pneumonia    COMPARISON: None.    PROCEDURE:   CT of the Chest was performed without intravenous contrast.  Sagittal and coronal reformats were performed.      FINDINGS:    CHEST:     CHEST WALL AND LOWER NECK: Within normal limits  MEDIASTINUM AND GÓMEZ: Mild mediastinal and 2.2 cm subtracheal adenopathy.  HEART: Within normal limits  VESSELS: Within normal limits  LUNG AND LARGE AIRWAYS: Complete opacification of the right middle and   right lower lobe bronchi. Dense confluent airspace consolidation of the   right middle and right lower lobes. Patchy and tree-in-bud airspace   opacities right upper lobe. Paraseptal emphysema. Trace right pleural   effusion.  VISUALIZED UPPER ABDOMEN: Within normal limits.  BONES: Multiple old left-sided rib fractures.    IMPRESSION: Soft tissue impaction of the right middle and lower lobe   bronchi. Extensive airspace consolidations ofthe right middle and lower   lobes. Overall findings may reflect severe multifocal pneumonia, however   close follow-up to in short resolution and exclude endobronchial mass.   Mild mediastinal adenopathy.    < end of copied text > HPI:  ID consult was called to evaluate this patient for right sided multifocal pneumonia, bacteremia and leukopenia.     As per H&P:  53 yo female with PMH of Asthma, presented with SOB, cough and right sided chest pain since yesterday morning. She started feeling short of breath, present at rest, not relieved with Albuterol inhaler, unable to ambulate due to SOB since yesterday. It was associated with wheeze and cough with whitish sputum. She also complains of subjective fever. Chest pain is present on right lower side, 7/10 in severity, on/off, non radiating, stabbing in nature, worse with sitting and moving and lying on that side, relieved with Motrin. Off note, she quit Heroin abuse on August 3rd and since then has been on Methadone 55mg OD. She goes to the Methadone clinic every day, 6 days a week for her Methadone dose. Clinic #: [phone]-Ext: [phone]. She has taken her dose for today. Primary team to call Clinic to confirm dose. Clinic was closed today. (02 Sep 2019 13:00)    REVIEW OF SYSTEMS:  [  ] Not able to illicit  General: no fevers no malaise  Chest: +cough +improved sob  GI: no nvd, mild lower abdominal pain  : has increased frequency and dark colored urine  Skin: no rashes  Musculoskeletal: no trauma no LBP  Neuro: no ha's no dizziness     PAST MEDICAL & SURGICAL HISTORY:  Asthma  No significant past surgical history    ALLERGIES: No Known Allergies    MEDS:  acetaminophen   Tablet .. 650 milliGRAM(s) Oral every 6 hours PRN  ALBUTerol/ipratropium for Nebulization 3 milliLiter(s) Nebulizer every 8 hours PRN  ALBUTerol/ipratropium for Nebulization 3 milliLiter(s) Nebulizer every 6 hours  folic acid 1 milliGRAM(s) Oral daily  guaiFENesin    Syrup 100 milliGRAM(s) Oral every 6 hours PRN  heparin  Injectable 5000 Unit(s) SubCutaneous every 8 hours  influenza   Vaccine 0.5 milliLiter(s) IntraMuscular once  ketorolac   Injectable 15 milliGRAM(s) IV Push every 8 hours PRN  loratadine 10 milliGRAM(s) Oral daily  methadone    Tablet 55 milliGRAM(s) Oral daily  methylPREDNISolone sodium succinate Injectable 40 milliGRAM(s) IV Push every 6 hours  piperacillin/tazobactam IVPB. 3.375 Gram(s) IV Intermittent once  piperacillin/tazobactam IVPB.. 3.375 Gram(s) IV Intermittent every 8 hours  sodium chloride 0.9%. 1000 milliLiter(s) IV Continuous <Continuous>    SOCIAL HISTORY:  Smoker:  active x 33 yrs    FAMILY HISTORY:  No pertinent family history in first degree relatives    VITALS:  Vital Signs Last 24 Hrs  T(C): 37.1 (03 Sep 2019 11:09), Max: 37.1 (02 Sep 2019 15:00)  T(F): 98.7 (03 Sep 2019 11:09), Max: 98.8 (02 Sep 2019 15:00)  HR: 85 (03 Sep 2019 11:09) (81 - 110)  BP: 100/54 (03 Sep 2019 11:09) (89/62 - 103/68)  BP(mean): 75 (02 Sep 2019 15:00) (75 - 75)  RR: 18 (03 Sep 2019 11:09) (18 - 22)  SpO2: 98% (03 Sep 2019 11:09) (94% - 99%)      PHYSICAL EXAM:  Constitutional: middle aged female with poor hygiene  HEENT: normocephalic with dry oral mucosa  Neck: supple no LN's no JVD  Respiratory: diffuse right sided rales no rhonchi  Cardiovascular: S1 S2 reg no murmurs  Gastrointestinal: +BS with soft, nondistended abdomen; mild suprapubic tenderness  Extremities: no edema no cyanosis  Skin: scattered hyperpigmented lesions on face  Ortho: no jt swelling  Neuro: AAO x 3      LABS/DIAGNOSTIC TESTS:                        11.0   2.98  )-----------( 106      ( 03 Sep 2019 06:47 )             33.1     WBC Count: 2.98 K/uL (09-03 @ 06:47)  WBC Count: 2.20 K/uL (09-02 @ 09:20)    09-03    134<L>  |  100  |  33<H>  ----------------------------<  109<H>  3.4<L>   |  26  |  1.21    Ca    8.2<L>      03 Sep 2019 06:47  Phos  3.0     09-03  Mg     1.8     09-03    TPro  6.1  /  Alb  2.1<L>  /  TBili  0.4  /  DBili  x   /  AST  52<H>  /  ALT  26  /  AlkPhos  42  09-03    LIVER FUNCTIONS - ( 03 Sep 2019 06:47 )  Alb: 2.1 g/dL / Pro: 6.1 g/dL / ALK PHOS: 42 U/L / ALT: 26 U/L DA / AST: 52 U/L / GGT: x             CULTURES:   .Blood  09-03 @ 01:11   Growth in aerobic bottle: Gram Negative Rods  Growth in anaerobic bottle: Gram Negative Rods  "Due to technical problems, Proteus sp. will Not be reported as part of  the BCID panel until further notice"  ***Blood Panel PCR results on this specimenare available  approximately 3 hours after the Gram stain result.***  Gram stain, PCR, and/or culture results may not always  correspond due to difference in methodologies.  ************************************************************  This PCR assaywas performed using [employer].  The following targets are tested for: Enterococcus,  vancomycin resistant enterococci, Listeria monocytogenes,  coagulase negative staphylococci, S. aureus,  methicillin resistant S. aureus, Streptococcus agalactiae  (Group B), S. pneumoniae, S. pyogenes (Group A),  Acinetobacter baumannii, Enterobacter cloacae, E. coli,  Klebsiella oxytoca, K. pneumoniae, Proteus sp.,  Serratia marcescens, Haemophilus influenzae,  Neisseria meningitidis, Pseudomonas aeruginosa, Candida  albicans, C. glabrata, C krusei, C parapsilosis,  C. tropicalis and the KPC resistance gene.  --  Blood Culture PCR      RADIOLOGY:  < from: CT Chest No Cont (09.02.19 @ 12:33) >  EXAM:  CT CHEST                            PROCEDURE DATE:  09/02/2019          INTERPRETATION:  CLINICAL INFORMATION: Pneumonia    COMPARISON: None.    PROCEDURE:   CT of the Chest was performed without intravenous contrast.  Sagittal and coronal reformats were performed.      FINDINGS:    CHEST:     CHEST WALL AND LOWER NECK: Within normal limits  MEDIASTINUM AND GÓMEZ: Mild mediastinal and 2.2 cm subtracheal adenopathy.  HEART: Within normal limits  VESSELS: Within normal limits  LUNG AND LARGE AIRWAYS: Complete opacification of the right middle and   right lower lobe bronchi. Dense confluent airspace consolidation of the   right middle and right lower lobes. Patchy and tree-in-bud airspace   opacities right upper lobe. Paraseptal emphysema. Trace right pleural   effusion.  VISUALIZED UPPER ABDOMEN: Within normal limits.  BONES: Multiple old left-sided rib fractures.    IMPRESSION: Soft tissue impaction of the right middle and lower lobe   bronchi. Extensive airspace consolidations ofthe right middle and lower   lobes. Overall findings may reflect severe multifocal pneumonia, however   close follow-up to in short resolution and exclude endobronchial mass.   Mild mediastinal adenopathy.    < end of copied text >

## 2019-09-04 LAB
ALBUMIN SERPL ELPH-MCNC: 2 G/DL — LOW (ref 3.5–5)
ALP SERPL-CCNC: 55 U/L — SIGNIFICANT CHANGE UP (ref 40–120)
ALT FLD-CCNC: 41 U/L DA — SIGNIFICANT CHANGE UP (ref 10–60)
ANION GAP SERPL CALC-SCNC: 6 MMOL/L — SIGNIFICANT CHANGE UP (ref 5–17)
AST SERPL-CCNC: 56 U/L — HIGH (ref 10–40)
BASOPHILS # BLD AUTO: 0.08 K/UL — SIGNIFICANT CHANGE UP (ref 0–0.2)
BASOPHILS NFR BLD AUTO: 1.4 % — SIGNIFICANT CHANGE UP (ref 0–2)
BILIRUB SERPL-MCNC: 0.3 MG/DL — SIGNIFICANT CHANGE UP (ref 0.2–1.2)
BUN SERPL-MCNC: 39 MG/DL — HIGH (ref 7–18)
CALCIUM SERPL-MCNC: 9.1 MG/DL — SIGNIFICANT CHANGE UP (ref 8.4–10.5)
CHLORIDE SERPL-SCNC: 107 MMOL/L — SIGNIFICANT CHANGE UP (ref 96–108)
CO2 SERPL-SCNC: 25 MMOL/L — SIGNIFICANT CHANGE UP (ref 22–31)
CREAT SERPL-MCNC: 1.04 MG/DL — SIGNIFICANT CHANGE UP (ref 0.5–1.3)
CULTURE RESULTS: NO GROWTH — SIGNIFICANT CHANGE UP
EOSINOPHIL # BLD AUTO: 0 K/UL — SIGNIFICANT CHANGE UP (ref 0–0.5)
EOSINOPHIL NFR BLD AUTO: 0 % — SIGNIFICANT CHANGE UP (ref 0–6)
GLUCOSE SERPL-MCNC: 108 MG/DL — HIGH (ref 70–99)
HCT VFR BLD CALC: 31.7 % — LOW (ref 34.5–45)
HGB BLD-MCNC: 10.6 G/DL — LOW (ref 11.5–15.5)
HIV 1+2 AB+HIV1 P24 AG SERPL QL IA: SIGNIFICANT CHANGE UP
IMM GRANULOCYTES NFR BLD AUTO: 14.4 % — HIGH (ref 0–1.5)
LYMPHOCYTES # BLD AUTO: 0.26 K/UL — LOW (ref 1–3.3)
LYMPHOCYTES # BLD AUTO: 4.5 % — LOW (ref 13–44)
MAGNESIUM SERPL-MCNC: 2.4 MG/DL — SIGNIFICANT CHANGE UP (ref 1.6–2.6)
MCHC RBC-ENTMCNC: 31.5 PG — SIGNIFICANT CHANGE UP (ref 27–34)
MCHC RBC-ENTMCNC: 33.4 GM/DL — SIGNIFICANT CHANGE UP (ref 32–36)
MCV RBC AUTO: 94.1 FL — SIGNIFICANT CHANGE UP (ref 80–100)
MONOCYTES # BLD AUTO: 0.24 K/UL — SIGNIFICANT CHANGE UP (ref 0–0.9)
MONOCYTES NFR BLD AUTO: 4.1 % — SIGNIFICANT CHANGE UP (ref 2–14)
NEUTROPHILS # BLD AUTO: 4.4 K/UL — SIGNIFICANT CHANGE UP (ref 1.8–7.4)
NEUTROPHILS NFR BLD AUTO: 75.6 % — SIGNIFICANT CHANGE UP (ref 43–77)
NRBC # BLD: 0 /100 WBCS — SIGNIFICANT CHANGE UP (ref 0–0)
PHOSPHATE SERPL-MCNC: 2.8 MG/DL — SIGNIFICANT CHANGE UP (ref 2.5–4.5)
PLATELET # BLD AUTO: 50 K/UL — LOW (ref 150–400)
POTASSIUM SERPL-MCNC: 3.7 MMOL/L — SIGNIFICANT CHANGE UP (ref 3.5–5.3)
POTASSIUM SERPL-SCNC: 3.7 MMOL/L — SIGNIFICANT CHANGE UP (ref 3.5–5.3)
PROT SERPL-MCNC: 6.3 G/DL — SIGNIFICANT CHANGE UP (ref 6–8.3)
RBC # BLD: 3.37 M/UL — LOW (ref 3.8–5.2)
RBC # FLD: 13.2 % — SIGNIFICANT CHANGE UP (ref 10.3–14.5)
SODIUM SERPL-SCNC: 138 MMOL/L — SIGNIFICANT CHANGE UP (ref 135–145)
SPECIMEN SOURCE: SIGNIFICANT CHANGE UP
WBC # BLD: 5.82 K/UL — SIGNIFICANT CHANGE UP (ref 3.8–10.5)
WBC # FLD AUTO: 5.82 K/UL — SIGNIFICANT CHANGE UP (ref 3.8–10.5)

## 2019-09-04 RX ORDER — NICOTINE POLACRILEX 2 MG
1 GUM BUCCAL DAILY
Refills: 0 | Status: DISCONTINUED | OUTPATIENT
Start: 2019-09-04 | End: 2019-09-05

## 2019-09-04 RX ADMIN — TRAMADOL HYDROCHLORIDE 50 MILLIGRAM(S): 50 TABLET ORAL at 21:15

## 2019-09-04 RX ADMIN — LORATADINE 10 MILLIGRAM(S): 10 TABLET ORAL at 11:06

## 2019-09-04 RX ADMIN — HEPARIN SODIUM 5000 UNIT(S): 5000 INJECTION INTRAVENOUS; SUBCUTANEOUS at 21:53

## 2019-09-04 RX ADMIN — METHADONE HYDROCHLORIDE 55 MILLIGRAM(S): 40 TABLET ORAL at 11:06

## 2019-09-04 RX ADMIN — TRAMADOL HYDROCHLORIDE 50 MILLIGRAM(S): 50 TABLET ORAL at 02:24

## 2019-09-04 RX ADMIN — Medication 650 MILLIGRAM(S): at 22:30

## 2019-09-04 RX ADMIN — Medication 20 MILLIGRAM(S): at 05:31

## 2019-09-04 RX ADMIN — TRAMADOL HYDROCHLORIDE 50 MILLIGRAM(S): 50 TABLET ORAL at 12:13

## 2019-09-04 RX ADMIN — PIPERACILLIN AND TAZOBACTAM 25 GRAM(S): 4; .5 INJECTION, POWDER, LYOPHILIZED, FOR SOLUTION INTRAVENOUS at 13:43

## 2019-09-04 RX ADMIN — Medication 650 MILLIGRAM(S): at 05:35

## 2019-09-04 RX ADMIN — Medication 15 MILLIGRAM(S): at 08:24

## 2019-09-04 RX ADMIN — Medication 3 MILLILITER(S): at 14:59

## 2019-09-04 RX ADMIN — Medication 650 MILLIGRAM(S): at 21:53

## 2019-09-04 RX ADMIN — Medication 100 MILLIGRAM(S): at 20:41

## 2019-09-04 RX ADMIN — Medication 15 MILLIGRAM(S): at 18:05

## 2019-09-04 RX ADMIN — Medication 15 MILLIGRAM(S): at 17:28

## 2019-09-04 RX ADMIN — Medication 3 MILLILITER(S): at 21:06

## 2019-09-04 RX ADMIN — Medication 650 MILLIGRAM(S): at 15:57

## 2019-09-04 RX ADMIN — Medication 15 MILLIGRAM(S): at 09:02

## 2019-09-04 RX ADMIN — Medication 100 MILLIGRAM(S): at 12:13

## 2019-09-04 RX ADMIN — Medication 1 MILLIGRAM(S): at 11:06

## 2019-09-04 RX ADMIN — TRAMADOL HYDROCHLORIDE 50 MILLIGRAM(S): 50 TABLET ORAL at 03:23

## 2019-09-04 RX ADMIN — TRAMADOL HYDROCHLORIDE 50 MILLIGRAM(S): 50 TABLET ORAL at 20:41

## 2019-09-04 RX ADMIN — TRAMADOL HYDROCHLORIDE 50 MILLIGRAM(S): 50 TABLET ORAL at 13:15

## 2019-09-04 RX ADMIN — PIPERACILLIN AND TAZOBACTAM 25 GRAM(S): 4; .5 INJECTION, POWDER, LYOPHILIZED, FOR SOLUTION INTRAVENOUS at 21:53

## 2019-09-04 RX ADMIN — Medication 650 MILLIGRAM(S): at 16:52

## 2019-09-04 RX ADMIN — Medication 100 MILLIGRAM(S): at 05:31

## 2019-09-04 RX ADMIN — PIPERACILLIN AND TAZOBACTAM 25 GRAM(S): 4; .5 INJECTION, POWDER, LYOPHILIZED, FOR SOLUTION INTRAVENOUS at 05:31

## 2019-09-04 RX ADMIN — Medication 1 PATCH: at 19:00

## 2019-09-04 RX ADMIN — Medication 650 MILLIGRAM(S): at 06:30

## 2019-09-04 RX ADMIN — Medication 1 PATCH: at 11:06

## 2019-09-04 RX ADMIN — HEPARIN SODIUM 5000 UNIT(S): 5000 INJECTION INTRAVENOUS; SUBCUTANEOUS at 13:43

## 2019-09-04 NOTE — PROGRESS NOTE ADULT - SUBJECTIVE AND OBJECTIVE BOX
Patient is a 54y old  Female who presents with a chief complaint of SOB, Right sided CP, cough since yesterday morning (04 Sep 2019 13:23)    PATIENT IS SEEN AND EXAMINED IN MEDICAL FLOOR.    ALLERGIES:  No Known Allergies    Daily Weight in k.9 (04 Sep 2019 04:48)    VITALS:    Vital Signs Last 24 Hrs  T(C): 36.3 (04 Sep 2019 19:58), Max: 36.6 (04 Sep 2019 04:48)  T(F): 97.4 (04 Sep 2019 19:58), Max: 97.8 (04 Sep 2019 04:48)  HR: 63 (04 Sep 2019 21:13) (63 - 90)  BP: 101/65 (04 Sep 2019 19:58) (93/59 - 101/65)  BP(mean): --  RR: 18 (04 Sep 2019 19:58) (18 - 18)  SpO2: 95% (04 Sep 2019 21:13) (93% - 97%)    LABS:    CBC Full  -  ( 04 Sep 2019 07:17 )  WBC Count : 5.82 K/uL  RBC Count : 3.37 M/uL  Hemoglobin : 10.6 g/dL  Hematocrit : 31.7 %  Platelet Count - Automated : 50 K/uL  Mean Cell Volume : 94.1 fl  Mean Cell Hemoglobin : 31.5 pg  Mean Cell Hemoglobin Concentration : 33.4 gm/dL  Auto Neutrophil # : 4.40 K/uL  Auto Lymphocyte # : 0.26 K/uL  Auto Monocyte # : 0.24 K/uL  Auto Eosinophil # : 0.00 K/uL  Auto Basophil # : 0.08 K/uL  Auto Neutrophil % : 75.6 %  Auto Lymphocyte % : 4.5 %  Auto Monocyte % : 4.1 %  Auto Eosinophil % : 0.0 %  Auto Basophil % : 1.4 %          138  |  107  |  39<H>  ----------------------------<  108<H>  3.7   |  25  |  1.04    Ca    9.1      04 Sep 2019 07:17  Phos  2.8     09-04  Mg     2.4     -    TPro  6.3  /  Alb  2.0<L>  /  TBili  0.3  /  DBili  x   /  AST  56<H>  /  ALT  41  /  AlkPhos  55  09-04    CARDIAC MARKERS ( 03 Sep 2019 06:47 )  <0.015 ng/mL / x     / x     / x     / x          LIVER FUNCTIONS - ( 04 Sep 2019 07:17 )  Alb: 2.0 g/dL / Pro: 6.3 g/dL / ALK PHOS: 55 U/L / ALT: 41 U/L DA / AST: 56 U/L / GGT: x           Creatinine Trend: 1.04<--, 1.21<--, 1.00<--  I&O's Summary    03 Sep 2019 07:01  -  04 Sep 2019 07:00  --------------------------------------------------------  IN: 430 mL / OUT: 0 mL / NET: 430 mL      .Urine   @ 01:22   No growth  --  --      .Blood   @ 01:11   Growth in aerobic and anaerobic bottles: Klebsiella pneumoniae  "Due to technical problems, Proteus sp. will Not be reported as part of  the BCID panel until further notice"  ***Blood Panel PCR results on this specimen are available  approximately 3 hours after the Gram stain result.***  Gram stain, PCR, and/or culture results may not always  correspond due to difference in methodologies.  ************************************************************  This PCR assay was performed using [employer].  The following targets are tested for: Enterococcus,  vancomycin resistant enterococci, Listeria monocytogenes,  coagulase negative staphylococci, S. aureus,  methicillin resistant S. aureus, Streptococcus agalactiae  (Group B), S. pneumoniae, S. pyogenes (Group A),  Acinetobacter baumannii, Enterobacter cloacae, E. coli,  Klebsiella oxytoca, K. pneumoniae, Proteus sp.,  Serratia marcescens, Haemophilus influenzae,  Neisseria meningitidis, Pseudomonas aeruginosa, Candida  albicans, C. glabrata, C krusei, C parapsilosis,  C. tropicalis and the KPC resistance gene.  --  Blood Culture PCR          MEDICATIONS:    MEDICATIONS  (STANDING):  ALBUTerol/ipratropium for Nebulization 3 milliLiter(s) Nebulizer every 6 hours  docusate sodium 100 milliGRAM(s) Oral daily  folic acid 1 milliGRAM(s) Oral daily  heparin  Injectable 5000 Unit(s) SubCutaneous every 8 hours  influenza   Vaccine 0.5 milliLiter(s) IntraMuscular once  loratadine 10 milliGRAM(s) Oral daily  methadone    Tablet 55 milliGRAM(s) Oral daily  methylPREDNISolone sodium succinate Injectable 40 milliGRAM(s) IV Push every 12 hours  nicotine -  14 mG/24Hr(s) Patch 1 patch Transdermal daily  piperacillin/tazobactam IVPB. 3.375 Gram(s) IV Intermittent once  piperacillin/tazobactam IVPB.. 3.375 Gram(s) IV Intermittent every 8 hours  predniSONE   Tablet 20 milliGRAM(s) Oral daily  senna 1 Tablet(s) Oral at bedtime  sodium chloride 0.9%. 1000 milliLiter(s) (100 mL/Hr) IV Continuous <Continuous>      MEDICATIONS  (PRN):  acetaminophen   Tablet .. 650 milliGRAM(s) Oral every 6 hours PRN Temp greater or equal to 38C (100.4F), Mild Pain (1 - 3)  ALBUTerol/ipratropium for Nebulization 3 milliLiter(s) Nebulizer every 8 hours PRN Bronchospasm  guaiFENesin    Syrup 100 milliGRAM(s) Oral every 6 hours PRN Cough  ketorolac   Injectable 15 milliGRAM(s) IV Push every 8 hours PRN Moderate Pain (4 - 6)  traMADol 50 milliGRAM(s) Oral every 8 hours PRN Severe Pain (7 - 10)      REVIEW OF SYSTEMS:                           ALL ROS DONE [ X   ]    CONSTITUTIONAL:  LETHARGIC [   ], FEVER [   ], UNRESPONSIVE [   ]  CVS:  CP  [   ], SOB, [   ], PALPITATIONS [   ], DIZZYNESS [   ]  RS: COUGH [   ], SPUTUM [   ]  GI: ABDOMINAL PAIN [   ], NAUSEA [   ], VOMITINGS [   ], DIARRHEA [   ], CONSTIPATION [   ]  :  DYSURIA [   ], NOCTURIA [   ], INCREASED FREQUENCY [   ], DRIBLING [   ],  SKELETAL: PAINFUL JOINTS [   ], SWOLLEN JOINTS [   ], NECK ACHE [   ], LOW BACK ACHE [   ],  SKIN : ULCERS [   ], RASH [   ], ITCHING [   ]  CNS: HEAD ACHE [   ], DOUBLE VISION [   ], BLURRED VISION [   ], AMS / CONFUSION [   ], SEIZURES [   ], WEAKNESS [   ],TINGLING / NUMBNESS [   ]    PHYSICAL EXAMINATION:  GENERAL APPEARANCE: NO DISTRESS  HEENT:  NO PALLOR, NO  JVD,  NO   NODES, NECK SUPPLE  CVS: S1 +, S2 +,   RS: AEEB,  OCCASIONAL  RALES +,   NO RONCHI  ABD: SOFT, NT, NO, BS +  EXT: NO PE  SKIN: WARM,   SKELETAL:  ROM ACCEPTABLE  CNS:  AAO X 3   , NO  DEFICITS    RADIOLOGY :    < from: CT Chest No Cont (19 @ 12:33) >  IMPRESSION: Soft tissue impaction of the right middle and lower lobe   bronchi. Extensive airspace consolidations ofthe right middle and lower   lobes. Overall findings may reflect severe multifocal pneumonia, however   close follow-up to in short resolution and exclude endobronchial mass.   Mild mediastinal adenopathy.    < end of copied text >        ASSESSMENT :     Lobar pneumonia  Asthma      PLAN:  HPI:  55 yo female with PMH of Asthma, presented with SOB, cough and right sided chest pain since yesterday morning. She started feeling short of breath, present at rest, not relieved with Albuterol inhaler, unable to ambulate due to SOB since yesterday. It was associated with wheeze and cough with whitish sputum. She also complains of subjective fever. Chest pain is present on right lower side, 7/10 in severity, on/off, non radiating, stabbing in nature, worse with sitting and moving and lying on that side, relieved with Motrin.     Off note, she quit Heroin abuse on  and since then has been on Methadone 55mg OD. She goes to the Methadone clinic every day, 6 days a week for her Methadone dose. Clinic #: [phone]-Ext: [phone]. She has taken her dose for today. Primary team to call Clinic to confirm dose. Clinic was closed today. (02 Sep 2019 13:00)    - MULTIFOCAL PNEUMONIA, SUSPECT ASPIRATION PNEUMONIA ON IV ZOSYN, STEROIDS ( USE ONLY ORAL PREDNISONE 40 MG AND TAPER IT OFF )   - CHEST PAIN, PLEURITIC  ON TRAMADOL, TYLENOL  - PULMONARY F/UP IS IN PROGRESS. MAY NEED BRONCHOSCOPY  - SUBSTANCE ABUSE , SW TO COORDINATE WITH PATIENT FOR OUT PATIENT REHAB   - GI AND DVT PROPHYLAXIS  - DR. TOVAR

## 2019-09-04 NOTE — PROGRESS NOTE ADULT - SUBJECTIVE AND OBJECTIVE BOX
Patient denies chest pain or shortness of breath.   Review of systems otherwise (-)  	  MEDICATIONS:  MEDICATIONS  (STANDING):  ALBUTerol/ipratropium for Nebulization 3 milliLiter(s) Nebulizer every 6 hours  docusate sodium 100 milliGRAM(s) Oral daily  folic acid 1 milliGRAM(s) Oral daily  heparin  Injectable 5000 Unit(s) SubCutaneous every 8 hours  influenza   Vaccine 0.5 milliLiter(s) IntraMuscular once  loratadine 10 milliGRAM(s) Oral daily  methadone    Tablet 55 milliGRAM(s) Oral daily  methylPREDNISolone sodium succinate Injectable 40 milliGRAM(s) IV Push every 12 hours  nicotine -  14 mG/24Hr(s) Patch 1 patch Transdermal daily  piperacillin/tazobactam IVPB. 3.375 Gram(s) IV Intermittent once  piperacillin/tazobactam IVPB.. 3.375 Gram(s) IV Intermittent every 8 hours  predniSONE   Tablet 20 milliGRAM(s) Oral daily  senna 1 Tablet(s) Oral at bedtime  sodium chloride 0.9%. 1000 milliLiter(s) (100 mL/Hr) IV Continuous <Continuous>      LABS:	 	    CARDIAC MARKERS:  CARDIAC MARKERS ( 03 Sep 2019 06:47 )  <0.015 ng/mL / x     / x     / x     / x      CARDIAC MARKERS ( 02 Sep 2019 16:58 )  <0.015 ng/mL / x     / x     / x     / x      CARDIAC MARKERS ( 02 Sep 2019 09:20 )  <0.015 ng/mL / x     / x     / x     / x                                    10.6   5.82  )-----------( 50       ( 04 Sep 2019 07:17 )             31.7     Hemoglobin: 10.6 g/dL (09-04 @ 07:17)  Hemoglobin: 11.0 g/dL (09-03 @ 06:47)  Hemoglobin: 14.0 g/dL (09-02 @ 09:20)      09-04    138  |  107  |  39<H>  ----------------------------<  108<H>  3.7   |  25  |  1.04    Ca    9.1      04 Sep 2019 07:17  Phos  2.8     09-04  Mg     2.4     09-04    TPro  6.3  /  Alb  2.0<L>  /  TBili  0.3  /  DBili  x   /  AST  56<H>  /  ALT  41  /  AlkPhos  55  09-04    Creatinine Trend: 1.04<--, 1.21<--, 1.00<--      PHYSICAL EXAM:  T(C): 36.4 (09-04-19 @ 07:31), Max: 37.1 (09-03-19 @ 11:09)  HR: 70 (09-04-19 @ 07:31) (70 - 92)  BP: 100/65 (09-04-19 @ 07:31) (92/59 - 100/65)  RR: 18 (09-04-19 @ 07:31) (18 - 18)  SpO2: 97% (09-04-19 @ 07:31) (91% - 98%)  Wt(kg): --  I&O's Summary    03 Sep 2019 07:01  -  04 Sep 2019 07:00  --------------------------------------------------------  IN: 430 mL / OUT: 0 mL / NET: 430 mL          Gen: Appears well in NAD  HEENT:  (-)icterus (-)pallor  CV: N S1 S2 1/6 ERYN (+)2 Pulses B/l  Resp:  Clear to ausculatation B/L, normal effort  GI: (+) BS Soft, NT, ND  Lymph:  (-)Edema, (-)obvious lymphadenopathy  Skin: Warm to touch, Normal turgor  Psych: Appropriate mood and affect      TELEMETRY: 	  Sinus        ASSESSMENT/PLAN: 	54y  Female PMH of Asthma, presented with SOB, cough and right sided chest pain since yesterday morning. She started feeling short of breath, present at rest, not relieved with Albuterol inhaler, unable to ambulate due to SOB since yesterday. It was associated with wheeze and cough with whitish sputum found with right lower lobe consolidation and klebsiella bacteremia.    - Echo with no pertinent findings  - ID eval noted likely UTI  - Stress in AM given risk factors  - Pulmonary eval noted    Rashid Roberts MD, [facility]  BEEPER [phone]

## 2019-09-04 NOTE — PROGRESS NOTE ADULT - SUBJECTIVE AND OBJECTIVE BOX
54y Female is under our care for right sided multifocal pneumonia, bacteremia and leukopenia. Patient admits she is feeling worse today and c/o persistent cough with brian colored sputum, right chest pain, dyspnea, dizziness and a sense of disorientation.  Wbc count has improved.     REVIEW OF SYSTEMS:  [  ] Not able to illicit  General: no fevers no malaise   Chest: +cough +sob +CP  GI: no nvd  : no urinary sxs   Skin: no rashes  Musculoskeletal: no trauma no LBP  Neuro: no ha's +dizziness     MEDS:  piperacillin/tazobactam IVPB. 3.375 Gram(s) IV Intermittent once  piperacillin/tazobactam IVPB.. 3.375 Gram(s) IV Intermittent every 8 hours    ALLERGIES: Allergies    No Known Allergies    Intolerances      VITALS:  Vital Signs Last 24 Hrs  T(C): 36.4 (04 Sep 2019 07:31), Max: 36.6 (03 Sep 2019 19:54)  T(F): 97.5 (04 Sep 2019 07:31), Max: 97.8 (03 Sep 2019 19:54)  HR: 70 (04 Sep 2019 07:31) (70 - 92)  BP: 100/65 (04 Sep 2019 07:31) (92/59 - 100/65)  BP(mean): --  RR: 18 (04 Sep 2019 07:31) (18 - 18)  SpO2: 97% (04 Sep 2019 07:31) (91% - 97%)      PHYSICAL EXAM:  HEENT: n/a  Neck: supple no LN's   Respiratory: diffuse right sided rales no rhonchi no wheezing  Cardiovascular: S1 S2 reg no murmurs  Gastrointestinal: +BS with soft, nondistended abdomen; mild suprapubic tenderness  Extremities: no edema   Skin: no new rashes  Ortho: n/a  Neuro: AAO x 3      LABS/DIAGNOSTIC TESTS:                        10.6   5.82  )-----------( 50       ( 04 Sep 2019 07:17 )             31.7     WBC Count: 5.82 K/uL (09-04 @ 07:17)  WBC Count: 2.98 K/uL (09-03 @ 06:47)  WBC Count: 2.20 K/uL (09-02 @ 09:20)    09-04    138  |  107  |  39<H>  ----------------------------<  108<H>  3.7   |  25  |  1.04    Ca    9.1      04 Sep 2019 07:17  Phos  2.8     09-04  Mg     2.4     09-04    TPro  6.3  /  Alb  2.0<L>  /  TBili  0.3  /  DBili  x   /  AST  56<H>  /  ALT  41  /  AlkPhos  55  09-04      CULTURES:   9/05 BC - ordered    9/03 UC - pending     .Blood  09-03 @ 01:11   Growth in aerobic and anaerobic bottles: Klebsiella pneumoniae  "Due to technical problems, Proteus sp. will Not be reported as part of  the BCID panel until further notice"  ***Blood Panel PCR results on this specimen are available  approximately 3 hours after the Gram stain result.***  Gram stain, PCR, and/or culture results may not always  correspond due to difference in methodologies.  ************************************************************  This PCR assay was performed using [employer].  The following targets are tested for: Enterococcus,  vancomycin resistant enterococci, Listeria monocytogenes,  coagulase negative staphylococci, S. aureus,  methicillin resistant S. aureus, Streptococcus agalactiae  (Group B), S. pneumoniae, S. pyogenes (Group A),  Acinetobacter baumannii, Enterobacter cloacae, E. coli,  Klebsiella oxytoca, K. pneumoniae, Proteus sp.,  Serratia marcescens, Haemophilus influenzae,  Neisseria meningitidis, Pseudomonas aeruginosa, Candida  albicans, C. glabrata, C krusei, C parapsilosis,  C. tropicalis and the KPC resistance gene.  --  Blood Culture PCR        RADIOLOGY:  no new studies

## 2019-09-04 NOTE — PROGRESS NOTE ADULT - SUBJECTIVE AND OBJECTIVE BOX
Patient seen and examined.     MEDICATIONS  (STANDING):  ALBUTerol/ipratropium for Nebulization 3 milliLiter(s) Nebulizer every 6 hours  docusate sodium 100 milliGRAM(s) Oral daily  folic acid 1 milliGRAM(s) Oral daily  heparin  Injectable 5000 Unit(s) SubCutaneous every 8 hours  influenza   Vaccine 0.5 milliLiter(s) IntraMuscular once  loratadine 10 milliGRAM(s) Oral daily  methadone    Tablet 55 milliGRAM(s) Oral daily  methylPREDNISolone sodium succinate Injectable 40 milliGRAM(s) IV Push every 12 hours  nicotine -  14 mG/24Hr(s) Patch 1 patch Transdermal daily  piperacillin/tazobactam IVPB. 3.375 Gram(s) IV Intermittent once  piperacillin/tazobactam IVPB.. 3.375 Gram(s) IV Intermittent every 8 hours  predniSONE   Tablet 20 milliGRAM(s) Oral daily  senna 1 Tablet(s) Oral at bedtime  sodium chloride 0.9%. 1000 milliLiter(s) (100 mL/Hr) IV Continuous <Continuous>      MEDICATIONS  (PRN):  acetaminophen   Tablet .. 650 milliGRAM(s) Oral every 6 hours PRN Temp greater or equal to 38C (100.4F), Mild Pain (1 - 3)  ALBUTerol/ipratropium for Nebulization 3 milliLiter(s) Nebulizer every 8 hours PRN Bronchospasm  guaiFENesin    Syrup 100 milliGRAM(s) Oral every 6 hours PRN Cough  ketorolac   Injectable 15 milliGRAM(s) IV Push every 8 hours PRN Moderate Pain (4 - 6)  traMADol 50 milliGRAM(s) Oral every 8 hours PRN Severe Pain (7 - 10)     Medications up to date at time of exam.    PHYSICAL EXAMINATION:  Patient has no new complaints.  GENERAL: The patient is a well-developed, well-nourished, in no apparent distress.     Vital Signs Last 24 Hrs  T(C): 36.4 (04 Sep 2019 07:31), Max: 36.6 (03 Sep 2019 19:54)  T(F): 97.5 (04 Sep 2019 07:31), Max: 97.8 (03 Sep 2019 19:54)  HR: 70 (04 Sep 2019 07:31) (70 - 92)  BP: 100/65 (04 Sep 2019 07:31) (92/59 - 100/65)  BP(mean): --  RR: 18 (04 Sep 2019 07:31) (18 - 18)  SpO2: 97% (04 Sep 2019 07:31) (91% - 97%)   (if applicable)    Chest Tube (if applicable)    HEENT: Head is normocephalic and atraumatic.     NECK: Supple, no palpable adenopathy.    LUNGS: Clear to auscultation, no wheezing, rales, or rhonchi. +decreased breath sounds at bases    HEART: Regular rate and rhythm without murmur.    ABDOMEN: Soft, nontender, and nondistended.      EXTREMITIES: Without any cyanosis, clubbing, rash, lesions or edema.    NEUROLOGIC: Awake, alert.    SKIN: Warm, dry, good turgor.    LABS:                        10.6   5.82  )-----------( 50       ( 04 Sep 2019 07:17 )             31.7     09-04    138  |  107  |  39<H>  ----------------------------<  108<H>  3.7   |  25  |  1.04    Ca    9.1      04 Sep 2019 07:17  Phos  2.8     09-  Mg     2.4     09-04    TPro  6.3  /  Alb  2.0<L>  /  TBili  0.3  /  DBili  x   /  AST  56<H>  /  ALT  41  /  AlkPhos  55  09-04      Urinalysis Basic - ( 03 Sep 2019 17:19 )    Color: Yellow / Appearance: Clear / S.015 / pH: x  Gluc: x / Ketone: Negative  / Bili: Negative / Urobili: Negative   Blood: x / Protein: 100 / Nitrite: Negative   Leuk Esterase: Trace / RBC: 5-10 /HPF / WBC 3-5 /HPF   Sq Epi: x / Non Sq Epi: Moderate /HPF / Bacteria: Moderate /HPF        CARDIAC MARKERS ( 03 Sep 2019 06:47 )  <0.015 ng/mL / x     / x     / x     / x      CARDIAC MARKERS ( 02 Sep 2019 16:58 )  <0.015 ng/mL / x     / x     / x     / x            MICROBIOLOGY: (if applicable)    RADIOLOGY & ADDITIONAL STUDIES:  EKG:   CXR:  ECHO:    IMPRESSION: 54y Female PAST MEDICAL & SURGICAL HISTORY:  Asthma  No significant past surgical history      IMP: This is 54 yr old woman active  smoker ( 40 pack years)  on methadone for heroine use admitted with pna.  CT chest show RML bronchus obstruction with mediastinal adenopathy may be due to pan vs malignancy    +klebsiella pneum bacteremia  HIV negative    RECOMMENDATIONS:  - con't with antibiotics as per ID recommendations.   -methadone  -dupnebs q6h  -may need bronchoscopy if no improvement  -dvt/gi prophy  -prednisone 20 mg daily x 5 days  -advise to stop smoking  -out pat pul f/u  -nicotine patch

## 2019-09-04 NOTE — PROGRESS NOTE ADULT - ASSESSMENT
1.	Multifocal pneumonia   2.	Klebisella pneumoniae bacteremia - likely from UTI  3.	UTI  4.	S/p leukopenia  ·	cont zosyn 3.375gm IV q8h  D3  ·	awaiting UC results   ·	repeat BC in am

## 2019-09-05 LAB
-  AMIKACIN: SIGNIFICANT CHANGE UP
-  AMPICILLIN/SULBACTAM: SIGNIFICANT CHANGE UP
-  AMPICILLIN: SIGNIFICANT CHANGE UP
-  AZTREONAM: SIGNIFICANT CHANGE UP
-  CEFAZOLIN: SIGNIFICANT CHANGE UP
-  CEFEPIME: SIGNIFICANT CHANGE UP
-  CEFOXITIN: SIGNIFICANT CHANGE UP
-  CEFTRIAXONE: SIGNIFICANT CHANGE UP
-  CIPROFLOXACIN: SIGNIFICANT CHANGE UP
-  ERTAPENEM: SIGNIFICANT CHANGE UP
-  GENTAMICIN: SIGNIFICANT CHANGE UP
-  IMIPENEM: SIGNIFICANT CHANGE UP
-  LEVOFLOXACIN: SIGNIFICANT CHANGE UP
-  MEROPENEM: SIGNIFICANT CHANGE UP
-  PIPERACILLIN/TAZOBACTAM: SIGNIFICANT CHANGE UP
-  TOBRAMYCIN: SIGNIFICANT CHANGE UP
-  TRIMETHOPRIM/SULFAMETHOXAZOLE: SIGNIFICANT CHANGE UP
ALBUMIN SERPL ELPH-MCNC: 1.9 G/DL — LOW (ref 3.5–5)
ALP SERPL-CCNC: 83 U/L — SIGNIFICANT CHANGE UP (ref 40–120)
ALT FLD-CCNC: 44 U/L DA — SIGNIFICANT CHANGE UP (ref 10–60)
ANION GAP SERPL CALC-SCNC: 6 MMOL/L — SIGNIFICANT CHANGE UP (ref 5–17)
AST SERPL-CCNC: 40 U/L — SIGNIFICANT CHANGE UP (ref 10–40)
BILIRUB SERPL-MCNC: 0.3 MG/DL — SIGNIFICANT CHANGE UP (ref 0.2–1.2)
BUN SERPL-MCNC: 31 MG/DL — HIGH (ref 7–18)
CALCIUM SERPL-MCNC: 8.7 MG/DL — SIGNIFICANT CHANGE UP (ref 8.4–10.5)
CHLORIDE SERPL-SCNC: 106 MMOL/L — SIGNIFICANT CHANGE UP (ref 96–108)
CO2 SERPL-SCNC: 26 MMOL/L — SIGNIFICANT CHANGE UP (ref 22–31)
CREAT SERPL-MCNC: 0.82 MG/DL — SIGNIFICANT CHANGE UP (ref 0.5–1.3)
CULTURE RESULTS: SIGNIFICANT CHANGE UP
CULTURE RESULTS: SIGNIFICANT CHANGE UP
GLUCOSE SERPL-MCNC: 147 MG/DL — HIGH (ref 70–99)
HCT VFR BLD CALC: 29.9 % — LOW (ref 34.5–45)
HCT VFR BLD CALC: 32.8 % — LOW (ref 34.5–45)
HGB BLD-MCNC: 10 G/DL — LOW (ref 11.5–15.5)
HGB BLD-MCNC: 11 G/DL — LOW (ref 11.5–15.5)
MAGNESIUM SERPL-MCNC: 2.5 MG/DL — SIGNIFICANT CHANGE UP (ref 1.6–2.6)
MCHC RBC-ENTMCNC: 31.2 PG — SIGNIFICANT CHANGE UP (ref 27–34)
MCHC RBC-ENTMCNC: 31.4 PG — SIGNIFICANT CHANGE UP (ref 27–34)
MCHC RBC-ENTMCNC: 33.4 GM/DL — SIGNIFICANT CHANGE UP (ref 32–36)
MCHC RBC-ENTMCNC: 33.5 GM/DL — SIGNIFICANT CHANGE UP (ref 32–36)
MCV RBC AUTO: 93.1 FL — SIGNIFICANT CHANGE UP (ref 80–100)
MCV RBC AUTO: 93.7 FL — SIGNIFICANT CHANGE UP (ref 80–100)
METHOD TYPE: SIGNIFICANT CHANGE UP
NRBC # BLD: 0 /100 WBCS — SIGNIFICANT CHANGE UP (ref 0–0)
NRBC # BLD: 0 /100 WBCS — SIGNIFICANT CHANGE UP (ref 0–0)
ORGANISM # SPEC MICROSCOPIC CNT: SIGNIFICANT CHANGE UP
PHOSPHATE SERPL-MCNC: 2.1 MG/DL — LOW (ref 2.5–4.5)
PLATELET # BLD AUTO: 62 K/UL — LOW (ref 150–400)
PLATELET # BLD AUTO: 70 K/UL — LOW (ref 150–400)
POTASSIUM SERPL-MCNC: 3.8 MMOL/L — SIGNIFICANT CHANGE UP (ref 3.5–5.3)
POTASSIUM SERPL-SCNC: 3.8 MMOL/L — SIGNIFICANT CHANGE UP (ref 3.5–5.3)
PROT SERPL-MCNC: 6.5 G/DL — SIGNIFICANT CHANGE UP (ref 6–8.3)
RBC # BLD: 3.21 M/UL — LOW (ref 3.8–5.2)
RBC # BLD: 3.5 M/UL — LOW (ref 3.8–5.2)
RBC # FLD: 13.2 % — SIGNIFICANT CHANGE UP (ref 10.3–14.5)
RBC # FLD: 13.5 % — SIGNIFICANT CHANGE UP (ref 10.3–14.5)
SODIUM SERPL-SCNC: 138 MMOL/L — SIGNIFICANT CHANGE UP (ref 135–145)
SPECIMEN SOURCE: SIGNIFICANT CHANGE UP
SPECIMEN SOURCE: SIGNIFICANT CHANGE UP
WBC # BLD: 5.12 K/UL — SIGNIFICANT CHANGE UP (ref 3.8–10.5)
WBC # BLD: 5.68 K/UL — SIGNIFICANT CHANGE UP (ref 3.8–10.5)
WBC # FLD AUTO: 5.12 K/UL — SIGNIFICANT CHANGE UP (ref 3.8–10.5)
WBC # FLD AUTO: 5.68 K/UL — SIGNIFICANT CHANGE UP (ref 3.8–10.5)

## 2019-09-05 RX ORDER — NICOTINE POLACRILEX 2 MG
1 GUM BUCCAL DAILY
Refills: 0 | Status: DISCONTINUED | OUTPATIENT
Start: 2019-09-05 | End: 2019-09-24

## 2019-09-05 RX ORDER — LIDOCAINE 4 G/100G
1 CREAM TOPICAL ONCE
Refills: 0 | Status: COMPLETED | OUTPATIENT
Start: 2019-09-05 | End: 2019-09-05

## 2019-09-05 RX ORDER — CHOLECALCIFEROL (VITAMIN D3) 125 MCG
1000 CAPSULE ORAL DAILY
Refills: 0 | Status: DISCONTINUED | OUTPATIENT
Start: 2019-09-05 | End: 2019-09-24

## 2019-09-05 RX ORDER — KETOROLAC TROMETHAMINE 30 MG/ML
15 SYRINGE (ML) INJECTION ONCE
Refills: 0 | Status: DISCONTINUED | OUTPATIENT
Start: 2019-09-05 | End: 2019-09-05

## 2019-09-05 RX ORDER — POTASSIUM PHOSPHATE, MONOBASIC POTASSIUM PHOSPHATE, DIBASIC 236; 224 MG/ML; MG/ML
15 INJECTION, SOLUTION INTRAVENOUS ONCE
Refills: 0 | Status: COMPLETED | OUTPATIENT
Start: 2019-09-05 | End: 2019-09-05

## 2019-09-05 RX ORDER — IBUPROFEN 200 MG
400 TABLET ORAL THREE TIMES A DAY
Refills: 0 | Status: COMPLETED | OUTPATIENT
Start: 2019-09-05 | End: 2019-09-08

## 2019-09-05 RX ADMIN — Medication 15 MILLIGRAM(S): at 03:24

## 2019-09-05 RX ADMIN — Medication 15 MILLIGRAM(S): at 16:40

## 2019-09-05 RX ADMIN — Medication 400 MILLIGRAM(S): at 22:17

## 2019-09-05 RX ADMIN — Medication 15 MILLIGRAM(S): at 17:10

## 2019-09-05 RX ADMIN — Medication 1 PATCH: at 11:01

## 2019-09-05 RX ADMIN — HEPARIN SODIUM 5000 UNIT(S): 5000 INJECTION INTRAVENOUS; SUBCUTANEOUS at 05:22

## 2019-09-05 RX ADMIN — POTASSIUM PHOSPHATE, MONOBASIC POTASSIUM PHOSPHATE, DIBASIC 62.5 MILLIMOLE(S): 236; 224 INJECTION, SOLUTION INTRAVENOUS at 16:40

## 2019-09-05 RX ADMIN — LORATADINE 10 MILLIGRAM(S): 10 TABLET ORAL at 11:01

## 2019-09-05 RX ADMIN — Medication 20 MILLIGRAM(S): at 05:24

## 2019-09-05 RX ADMIN — Medication 650 MILLIGRAM(S): at 13:39

## 2019-09-05 RX ADMIN — Medication 400 MILLIGRAM(S): at 22:50

## 2019-09-05 RX ADMIN — TRAMADOL HYDROCHLORIDE 50 MILLIGRAM(S): 50 TABLET ORAL at 19:20

## 2019-09-05 RX ADMIN — PIPERACILLIN AND TAZOBACTAM 25 GRAM(S): 4; .5 INJECTION, POWDER, LYOPHILIZED, FOR SOLUTION INTRAVENOUS at 22:19

## 2019-09-05 RX ADMIN — Medication 100 MILLIGRAM(S): at 11:01

## 2019-09-05 RX ADMIN — Medication 100 MILLIGRAM(S): at 18:48

## 2019-09-05 RX ADMIN — Medication 3 MILLILITER(S): at 15:22

## 2019-09-05 RX ADMIN — Medication 650 MILLIGRAM(S): at 13:09

## 2019-09-05 RX ADMIN — Medication 1 PATCH: at 15:41

## 2019-09-05 RX ADMIN — Medication 1 PATCH: at 07:21

## 2019-09-05 RX ADMIN — Medication 1000 UNIT(S): at 11:01

## 2019-09-05 RX ADMIN — LIDOCAINE 1 PATCH: 4 CREAM TOPICAL at 19:00

## 2019-09-05 RX ADMIN — LIDOCAINE 1 PATCH: 4 CREAM TOPICAL at 15:39

## 2019-09-05 RX ADMIN — Medication 100 MILLIGRAM(S): at 11:17

## 2019-09-05 RX ADMIN — METHADONE HYDROCHLORIDE 55 MILLIGRAM(S): 40 TABLET ORAL at 10:58

## 2019-09-05 RX ADMIN — Medication 1 MILLIGRAM(S): at 11:00

## 2019-09-05 RX ADMIN — TRAMADOL HYDROCHLORIDE 50 MILLIGRAM(S): 50 TABLET ORAL at 07:58

## 2019-09-05 RX ADMIN — PIPERACILLIN AND TAZOBACTAM 25 GRAM(S): 4; .5 INJECTION, POWDER, LYOPHILIZED, FOR SOLUTION INTRAVENOUS at 14:09

## 2019-09-05 RX ADMIN — Medication 1 PATCH: at 19:19

## 2019-09-05 RX ADMIN — PIPERACILLIN AND TAZOBACTAM 25 GRAM(S): 4; .5 INJECTION, POWDER, LYOPHILIZED, FOR SOLUTION INTRAVENOUS at 05:22

## 2019-09-05 RX ADMIN — TRAMADOL HYDROCHLORIDE 50 MILLIGRAM(S): 50 TABLET ORAL at 18:48

## 2019-09-05 RX ADMIN — TRAMADOL HYDROCHLORIDE 50 MILLIGRAM(S): 50 TABLET ORAL at 08:30

## 2019-09-05 RX ADMIN — Medication 1 PATCH: at 15:39

## 2019-09-05 RX ADMIN — Medication 3 MILLILITER(S): at 20:18

## 2019-09-05 RX ADMIN — Medication 15 MILLIGRAM(S): at 04:00

## 2019-09-05 NOTE — PROGRESS NOTE ADULT - SUBJECTIVE AND OBJECTIVE BOX
54y Female is under our care for right sided multifocal pneumonia and bacteremia. Patient is feeling slightly better today but still has persistent cough with brian colored sputum. Admits dyspnea has improved.  Right chest pain is still lingering. Repeat BC were done this am.  UC is negative, but on antibiotics already.    REVIEW OF SYSTEMS:  [  ] Not able to illicit  General: no fevers no malaise   Chest: +cough no sob +CP  GI: no nvd  : no urinary sxs   Skin: no rashes  Musculoskeletal: no trauma no LBP  Neuro: no ha's +dizziness     MEDS:  piperacillin/tazobactam IVPB. 3.375 Gram(s) IV Intermittent once  piperacillin/tazobactam IVPB.. 3.375 Gram(s) IV Intermittent every 8 hours    ALLERGIES: Allergies    No Known Allergies    Intolerances      VITALS:  Vital Signs Last 24 Hrs  T(C): 36.8 (05 Sep 2019 11:31), Max: 36.8 (05 Sep 2019 04:50)  T(F): 98.2 (05 Sep 2019 11:31), Max: 98.2 (05 Sep 2019 04:50)  HR: 81 (05 Sep 2019 11:31) (63 - 99)  BP: 119/64 (05 Sep 2019 11:31) (101/65 - 144/82)  BP(mean): --  RR: 18 (05 Sep 2019 11:31) (16 - 18)  SpO2: 98% (05 Sep 2019 11:31) (93% - 100%)    PHYSICAL EXAM:  HEENT: n/a  Neck: supple no LN's   Respiratory: right mid to low lung base rales no rhonchi   Cardiovascular: S1 S2 reg no murmurs  Gastrointestinal: +BS with soft, nondistended abdomen; no tenderness  Extremities: no edema   Skin: no new rashes  Ortho: n/a  Neuro: AAO x 3      LABS/DIAGNOSTIC TESTS:                          11.0   5.68  )-----------( 70       ( 05 Sep 2019 12:30 )             32.8   09-05    138  |  106  |  31<H>  ----------------------------<  147<H>  3.8   |  26  |  0.82    Ca    8.7      05 Sep 2019 12:30  Phos  2.1     09-05  Mg     2.5     09-05    TPro  6.5  /  Alb  1.9<L>  /  TBili  0.3  /  DBili  x   /  AST  40  /  ALT  44  /  AlkPhos  83  09-05      CULTURES:   9/05 BC - pending     .Urine  09-04 @ 01:22   No growth  --  --      .Blood  09-03 @ 01:11   Growth in aerobic and anaerobic bottles: Klebsiella pneumoniae  "Due to technical problems, Proteus sp. will Not be reported as part of  the BCID panel until further notice"  ***Blood Panel PCR results on this specimen are available  approximately 3 hours after the Gram stain result.***  Gram stain, PCR, and/or culture results may not always  correspond due to difference in methodologies.  ************************************************************  This PCR assay was performed using [employer].  The following targets are tested for: Enterococcus,  vancomycin resistant enterococci, Listeria monocytogenes,  coagulase negative staphylococci, S. aureus,  methicillin resistant S. aureus, Streptococcus agalactiae  (Group B), S. pneumoniae, S. pyogenes (Group A),  Acinetobacter baumannii, Enterobacter cloacae, E. coli,  Klebsiella oxytoca, K. pneumoniae, Proteus sp.,  Serratia marcescens, Haemophilus influenzae,  Neisseria meningitidis, Pseudomonas aeruginosa, Candida  albicans, C. glabrata, C krusei, C parapsilosis,  C. tropicalis and the KPC resistance gene.  --  Blood Culture PCR  Klebsiella pneumoniae      RADIOLOGY:  no new studies

## 2019-09-05 NOTE — PROGRESS NOTE ADULT - PROBLEM SELECTOR PLAN 4
resolved now  could be due to sepsis   Monitor CBC  f/u urine cultures, platelets are also low today (62k), Hb 10 with 5% metamyleocytes  HIV negative

## 2019-09-05 NOTE — PROGRESS NOTE ADULT - PROBLEM SELECTOR PLAN 3
p/w Right side chest pain, worse with ambulation, and lying on right side.  Tenderness present on palpation. CXR; right sided PNa   -EKG: no acute changes  -trops negative  -most likely due to costochondritis, Toradol for inflammatory pain   -off tele as normal stress test

## 2019-09-05 NOTE — PROGRESS NOTE ADULT - ASSESSMENT
1.	Multifocal pneumonia   2.	Klebisella pneumoniae bacteremia - likely from UTI  3.	UTI  ·	cont zosyn 3.375gm IV q8h  D4  ·	awaiting repeat BC results

## 2019-09-05 NOTE — PROGRESS NOTE ADULT - SUBJECTIVE AND OBJECTIVE BOX
Time of Visit:  Patient seen and examined.     MEDICATIONS  (STANDING):  ALBUTerol/ipratropium for Nebulization 3 milliLiter(s) Nebulizer every 6 hours  cholecalciferol 1000 Unit(s) Oral daily  docusate sodium 100 milliGRAM(s) Oral daily  folic acid 1 milliGRAM(s) Oral daily  heparin  Injectable 5000 Unit(s) SubCutaneous every 8 hours  influenza   Vaccine 0.5 milliLiter(s) IntraMuscular once  loratadine 10 milliGRAM(s) Oral daily  methadone    Tablet 55 milliGRAM(s) Oral daily  methylPREDNISolone sodium succinate Injectable 40 milliGRAM(s) IV Push every 12 hours  nicotine - 21 mG/24Hr(s) Patch 1 patch Transdermal daily  piperacillin/tazobactam IVPB. 3.375 Gram(s) IV Intermittent once  piperacillin/tazobactam IVPB.. 3.375 Gram(s) IV Intermittent every 8 hours  predniSONE   Tablet 20 milliGRAM(s) Oral daily  senna 1 Tablet(s) Oral at bedtime  sodium chloride 0.9%. 1000 milliLiter(s) (100 mL/Hr) IV Continuous <Continuous>      MEDICATIONS  (PRN):  acetaminophen   Tablet .. 650 milliGRAM(s) Oral every 6 hours PRN Temp greater or equal to 38C (100.4F), Mild Pain (1 - 3)  ALBUTerol/ipratropium for Nebulization 3 milliLiter(s) Nebulizer every 8 hours PRN Bronchospasm  guaiFENesin    Syrup 100 milliGRAM(s) Oral every 6 hours PRN Cough  traMADol 50 milliGRAM(s) Oral every 8 hours PRN Severe Pain (7 - 10)       Medications up to date at time of exam.      PHYSICAL EXAMINATION:  Patient has no new complaints.  GENERAL: The patient is a well-developed, well-nourished, in no apparent distress.     Vital Signs Last 24 Hrs  T(C): 36.8 (05 Sep 2019 11:31), Max: 36.8 (05 Sep 2019 04:50)  T(F): 98.2 (05 Sep 2019 11:31), Max: 98.2 (05 Sep 2019 04:50)  HR: 81 (05 Sep 2019 11:31) (63 - 99)  BP: 119/64 (05 Sep 2019 11:31) (101/65 - 144/82)  BP(mean): --  RR: 18 (05 Sep 2019 11:31) (16 - 18)  SpO2: 98% (05 Sep 2019 11:31) (93% - 100%)   (if applicable)    Chest Tube (if applicable)    HEENT: Head is normocephalic and atraumatic. Extraocular muscles are intact. Mucous membranes are moist.     NECK: Supple, no palpable adenopathy.    LUNGS: Clear to auscultation, no wheezing, rales, or rhonchi.    HEART: Regular rate and rhythm without murmur.    ABDOMEN: Soft, nontender, and nondistended.  No hepatosplenomegaly is noted.    : No painful voiding, no pelvic pain    EXTREMITIES: Without any cyanosis, clubbing, rash, lesions or edema.    NEUROLOGIC: Awake, alert, oriented, grossly intact    SKIN: Warm, dry, good turgor.      LABS:                        11.0   5.68  )-----------( 70       ( 05 Sep 2019 12:30 )             32.8     09-05    138  |  106  |  31<H>  ----------------------------<  147<H>  3.8   |  26  |  0.82    Ca    8.7      05 Sep 2019 12:30  Phos  2.1     09-05  Mg     2.5     09-05    TPro  6.5  /  Alb  1.9<L>  /  TBili  0.3  /  DBili  x   /  AST  40  /  ALT  44  /  AlkPhos  83  09-05                        MICROBIOLOGY: (if applicable)    RADIOLOGY & ADDITIONAL STUDIES:  EKG:   CXR:  ECHO:    IMPRESSION: 54y Female PAST MEDICAL & SURGICAL HISTORY:  Asthma  No significant past surgical history   p/w           RECOMMENDATIONS: Time of Visit:  Patient seen and examined.     MEDICATIONS  (STANDING):  ALBUTerol/ipratropium for Nebulization 3 milliLiter(s) Nebulizer every 6 hours  cholecalciferol 1000 Unit(s) Oral daily  docusate sodium 100 milliGRAM(s) Oral daily  folic acid 1 milliGRAM(s) Oral daily  heparin  Injectable 5000 Unit(s) SubCutaneous every 8 hours  influenza   Vaccine 0.5 milliLiter(s) IntraMuscular once  loratadine 10 milliGRAM(s) Oral daily  methadone    Tablet 55 milliGRAM(s) Oral daily  methylPREDNISolone sodium succinate Injectable 40 milliGRAM(s) IV Push every 12 hours  nicotine - 21 mG/24Hr(s) Patch 1 patch Transdermal daily  piperacillin/tazobactam IVPB. 3.375 Gram(s) IV Intermittent once  piperacillin/tazobactam IVPB.. 3.375 Gram(s) IV Intermittent every 8 hours  predniSONE   Tablet 20 milliGRAM(s) Oral daily  senna 1 Tablet(s) Oral at bedtime  sodium chloride 0.9%. 1000 milliLiter(s) (100 mL/Hr) IV Continuous <Continuous>      MEDICATIONS  (PRN):  acetaminophen   Tablet .. 650 milliGRAM(s) Oral every 6 hours PRN Temp greater or equal to 38C (100.4F), Mild Pain (1 - 3)  ALBUTerol/ipratropium for Nebulization 3 milliLiter(s) Nebulizer every 8 hours PRN Bronchospasm  guaiFENesin    Syrup 100 milliGRAM(s) Oral every 6 hours PRN Cough  traMADol 50 milliGRAM(s) Oral every 8 hours PRN Severe Pain (7 - 10)       Medications up to date at time of exam.      PHYSICAL EXAMINATION:  Patient has no new complaints.  GENERAL: The patient is a well-developed, well-nourished, in no apparent distress.     Vital Signs Last 24 Hrs  T(C): 36.8 (05 Sep 2019 11:31), Max: 36.8 (05 Sep 2019 04:50)  T(F): 98.2 (05 Sep 2019 11:31), Max: 98.2 (05 Sep 2019 04:50)  HR: 81 (05 Sep 2019 11:31) (63 - 99)  BP: 119/64 (05 Sep 2019 11:31) (101/65 - 144/82)  BP(mean): --  RR: 18 (05 Sep 2019 11:31) (16 - 18)  SpO2: 98% (05 Sep 2019 11:31) (93% - 100%)   (if applicable)    Chest Tube (if applicable)    HEENT: Head is normocephalic and atraumatic. Extraocular muscles are intact. Mucous membranes are moist.     NECK: Supple, no palpable adenopathy.    LUNGS: Clear to auscultation, no wheezing, rales, or rhonchi.    HEART: Regular rate and rhythm without murmur.    ABDOMEN: Soft, nontender, and nondistended.  No hepatosplenomegaly is noted.    : No painful voiding, no pelvic pain    EXTREMITIES: Without any cyanosis, clubbing, rash, lesions or edema.    NEUROLOGIC: Awake, alert, oriented, grossly intact    SKIN: Warm, dry, good turgor.      LABS:                        11.0   5.68  )-----------( 70       ( 05 Sep 2019 12:30 )             32.8     09-05    138  |  106  |  31<H>  ----------------------------<  147<H>  3.8   |  26  |  0.82    Ca    8.7      05 Sep 2019 12:30  Phos  2.1     09-05  Mg     2.5     09-05    TPro  6.5  /  Alb  1.9<L>  /  TBili  0.3  /  DBili  x   /  AST  40  /  ALT  44  /  AlkPhos  83  09-05                        MICROBIOLOGY: (if applicable)    RADIOLOGY & ADDITIONAL STUDIES:  EKG:   CXR:  ECHO:    IMPRESSION: 54y Female PAST MEDICAL & SURGICAL HISTORY:  Asthma  No significant past surgical history   p/w         IMP: This is 54 yr old woman active  smoker ( 40 pack years)  on methadone for heroine use admitted with pna.  CT chest show RML bronchus obstruction with mediastinal adenopathy may be due to pan vs malignancy    +klebsiella pneum bacteremia 9/3 repeat BC 9/5 neg  HIV negative    RECOMMENDATIONS:  - con't with antibiotics as per ID recommendations.   -methadone  -dupnebs q6h  -may need bronchoscopy if no improvement  -dvt/gi prophy  -prednisone 20 mg daily x 5 days started 9/3  -advise to stop smoking  -out pat pul f/u  -nicotine patch

## 2019-09-05 NOTE — PROGRESS NOTE ADULT - SUBJECTIVE AND OBJECTIVE BOX
Patient denies chest pain or shortness of breath.   Review of systems otherwise (-)  	  acetaminophen   Tablet .. 650 milliGRAM(s) Oral every 6 hours PRN  ALBUTerol/ipratropium for Nebulization 3 milliLiter(s) Nebulizer every 8 hours PRN  ALBUTerol/ipratropium for Nebulization 3 milliLiter(s) Nebulizer every 6 hours  cholecalciferol 1000 Unit(s) Oral daily  docusate sodium 100 milliGRAM(s) Oral daily  folic acid 1 milliGRAM(s) Oral daily  guaiFENesin    Syrup 100 milliGRAM(s) Oral every 6 hours PRN  heparin  Injectable 5000 Unit(s) SubCutaneous every 8 hours  influenza   Vaccine 0.5 milliLiter(s) IntraMuscular once  ketorolac   Injectable 15 milliGRAM(s) IV Push every 8 hours PRN  loratadine 10 milliGRAM(s) Oral daily  methadone    Tablet 55 milliGRAM(s) Oral daily  methylPREDNISolone sodium succinate Injectable 40 milliGRAM(s) IV Push every 12 hours  nicotine -  14 mG/24Hr(s) Patch 1 patch Transdermal daily  piperacillin/tazobactam IVPB. 3.375 Gram(s) IV Intermittent once  piperacillin/tazobactam IVPB.. 3.375 Gram(s) IV Intermittent every 8 hours  predniSONE   Tablet 20 milliGRAM(s) Oral daily  senna 1 Tablet(s) Oral at bedtime  sodium chloride 0.9%. 1000 milliLiter(s) IV Continuous <Continuous>  traMADol 50 milliGRAM(s) Oral every 8 hours PRN                            10.0   5.12  )-----------( 62       ( 05 Sep 2019 07:10 )             29.9       Hemoglobin: 10.0 g/dL (09-05 @ 07:10)  Hemoglobin: 10.6 g/dL (09-04 @ 07:17)  Hemoglobin: 11.0 g/dL (09-03 @ 06:47)  Hemoglobin: 14.0 g/dL (09-02 @ 09:20)      09-04    138  |  107  |  39<H>  ----------------------------<  108<H>  3.7   |  25  |  1.04    Ca    9.1      04 Sep 2019 07:17  Phos  2.8     09-04  Mg     2.4     09-04    TPro  6.3  /  Alb  2.0<L>  /  TBili  0.3  /  DBili  x   /  AST  56<H>  /  ALT  41  /  AlkPhos  55  09-04    Creatinine Trend: 1.04<--, 1.21<--, 1.00<--    COAGS:     CARDIAC MARKERS ( 03 Sep 2019 06:47 )  <0.015 ng/mL / x     / x     / x     / x      CARDIAC MARKERS ( 02 Sep 2019 16:58 )  <0.015 ng/mL / x     / x     / x     / x            T(C): 36.5 (09-05-19 @ 08:01), Max: 36.8 (09-05-19 @ 04:50)  HR: 66 (09-05-19 @ 08:01) (63 - 99)  BP: 108/66 (09-05-19 @ 08:01) (93/60 - 144/82)  RR: 16 (09-05-19 @ 08:01) (16 - 18)  SpO2: 100% (09-05-19 @ 08:01) (93% - 100%)  Wt(kg): --    I&O's Summary        Gen: Appears well in NAD  HEENT:  (-)icterus (-)pallor  CV: N S1 S2 1/6 ERYN (+)2 Pulses B/l  Resp:  Clear to ausculatation B/L, normal effort  GI: (+) BS Soft, NT, ND  Lymph:  (-)Edema, (-)obvious lymphadenopathy  Skin: Warm to touch, Normal turgor  Psych: Appropriate mood and affect      TELEMETRY: 	  Sinus        ASSESSMENT/PLAN: 	54y  Female PMH of Asthma, presented with SOB, cough and right sided chest pain since yesterday morning. She started feeling short of breath, present at rest, not relieved with Albuterol inhaler, unable to ambulate due to SOB since yesterday. It was associated with wheeze and cough with whitish sputum found with right lower lobe consolidation and klebsiella bacteremia.    - Echo with no pertinent findings  - ID eval noted likely UTI  - Stress test today with no ischemia  - No need for further inpatient cardiac work up.  - D/C tele    Rashid Roberts MD, [facility]  BEEPER [phone]

## 2019-09-05 NOTE — SBIRT NOTE ADULT - NSSBIRTALCNOACTINTDET_GEN_A_CORE
Pt. refused to answer any other questions.  She wants to go into inpatient treatment for heroin abuse.

## 2019-09-05 NOTE — PROGRESS NOTE ADULT - SUBJECTIVE AND OBJECTIVE BOX
PGY 1 Note discussed with supervising resident and primary attending    Patient is a 54y old  Female who presents with a chief complaint of SOB, Right sided CP, cough since yesterday morning (03 Sep 2019 12:56)      INTERVAL HPI/OVERNIGHT EVENTS: Patient seen and examined at the bedside. Pt still complaining of severe pain. Pt demanding IV toradol in the afternoon as according to her lidocaine patch and tramdol patch do not help. Pt also asked to increase her nicotine patch from 14 to 21.    MEDICATIONS  (STANDING):  ALBUTerol/ipratropium for Nebulization 3 milliLiter(s) Nebulizer every 6 hours  folic acid 1 milliGRAM(s) Oral daily  heparin  Injectable 5000 Unit(s) SubCutaneous every 8 hours  influenza   Vaccine 0.5 milliLiter(s) IntraMuscular once  loratadine 10 milliGRAM(s) Oral daily  methadone    Tablet 55 milliGRAM(s) Oral daily  methylPREDNISolone sodium succinate Injectable 40 milliGRAM(s) IV Push every 6 hours  piperacillin/tazobactam IVPB. 3.375 Gram(s) IV Intermittent once  piperacillin/tazobactam IVPB.. 3.375 Gram(s) IV Intermittent every 8 hours  sodium chloride 0.9%. 1000 milliLiter(s) (100 mL/Hr) IV Continuous <Continuous>    MEDICATIONS  (PRN):  acetaminophen   Tablet .. 650 milliGRAM(s) Oral every 6 hours PRN Temp greater or equal to 38C (100.4F), Mild Pain (1 - 3)  ALBUTerol/ipratropium for Nebulization 3 milliLiter(s) Nebulizer every 8 hours PRN Bronchospasm  guaiFENesin    Syrup 100 milliGRAM(s) Oral every 6 hours PRN Cough  ketorolac   Injectable 15 milliGRAM(s) IV Push every 8 hours PRN Moderate Pain (4 - 6)      __________________________________________________  REVIEW OF SYSTEMS:    CONSTITUTIONAL: No fever,   EYES: no acute visual disturbances  NECK: No pain or stiffness  RESPIRATORY: +cough; +shortness of breath  CARDIOVASCULAR: +chest pain, no palpitations  GASTROINTESTINAL: No pain. No nausea or vomiting; No diarrhea   NEUROLOGICAL: No headache or numbness, no tremors  MUSCULOSKELETAL: No joint pain, no muscle pain  GENITOURINARY: no dysuria, no frequency, no hesitancy  PSYCHIATRY: no depression , no anxiety  ALL OTHER  ROS negative        Vital Signs Last 24 Hrs  T(C): 37.1 (03 Sep 2019 11:09), Max: 37.1 (02 Sep 2019 15:00)  T(F): 98.7 (03 Sep 2019 11:09), Max: 98.8 (02 Sep 2019 15:00)  HR: 85 (03 Sep 2019 11:09) (81 - 110)  BP: 100/54 (03 Sep 2019 11:09) (89/62 - 103/68)  BP(mean): 75 (02 Sep 2019 15:00) (75 - 75)  RR: 18 (03 Sep 2019 11:09) (18 - 22)  SpO2: 98% (03 Sep 2019 11:09) (94% - 99%)    ________________________________________________  PHYSICAL EXAM:  GENERAL: NAD  HEENT: Normocephalic;  conjunctivae and sclerae clear; moist mucous membranes;   NECK : supple  CHEST/LUNG: Clear to auscultation bilaterally, decreased breath sounds on right base    HEART: S1 S2  regular; no murmurs, gallops or rubs  ABDOMEN: Soft, Nontender, Nondistended; Bowel sounds present  EXTREMITIES: no cyanosis; no edema; no calf tenderness  SKIN: warm and dry; no rash  NERVOUS SYSTEM:  Awake and alert; Oriented  to place, person and time ; no new deficits    _________________________________________________  LABS:                        11.0   2.98  )-----------( 106      ( 03 Sep 2019 06:47 )             33.1     09-03    134<L>  |  100  |  33<H>  ----------------------------<  109<H>  3.4<L>   |  26  |  1.21    Ca    8.2<L>      03 Sep 2019 06:47  Phos  3.0     09-03  Mg     1.8     09-03    TPro  6.1  /  Alb  2.1<L>  /  TBili  0.4  /  DBili  x   /  AST  52<H>  /  ALT  26  /  AlkPhos  42  09-03        CAPILLARY BLOOD GLUCOSE            RADIOLOGY & ADDITIONAL TESTS:    < from: CT Chest No Cont (09.02.19 @ 12:33) >  CHEST WALL AND LOWER NECK: Within normal limits  MEDIASTINUM AND GÓMEZ: Mild mediastinal and 2.2 cm subtracheal adenopathy.  HEART: Within normal limits  VESSELS: Within normal limits  LUNG AND LARGE AIRWAYS: Complete opacification of the right middle and   right lower lobe bronchi. Dense confluent airspace consolidation of the   right middle and right lower lobes. Patchy and tree-in-bud airspace   opacities right upper lobe. Paraseptal emphysema. Trace right pleural   effusion.  VISUALIZED UPPER ABDOMEN: Within normal limits.  BONES: Multiple old left-sided rib fractures.    IMPRESSION: Soft tissue impaction of the right middle and lower lobe   bronchi. Extensive airspace consolidations ofthe right middle and lower   lobes. Overall findings may reflect severe multifocal pneumonia, however   close follow-up to in short resolution and exclude endobronchial mass.   Mild mediastinal adenopathy.    < from: Transthoracic Echocardiogram (09.03.19 @ 07:20) >  CONCLUSIONS:  1. Normal mitral valve. Mild mitral regurgitation.  2. Normal trileaflet aortic valve.  3. Aortic Root: 3.2 cm.    4. Normal left atrium.  LA volume index = 23 cc/m2.  5. Normal left ventricular internal dimensions and wall  thicknesses.  6. Normal Left Ventricular Systolic Function,  (EF = 55 to  60%)  7. Grade II diastolic dysfunction.  8. Normal right atrium.  9. Normal right ventricular size and systolic function  (TAPSE  2.5cm).  10. RV systolic pressure is normal at  29 mm Hg.  11. There is mild tricuspid regurgitation.  12. Normal pulmonic valve.  13. Normal pericardium with no pericardial effusion.    < end of copied text >      < from: Nuclear Stress Test-Pharmacologic (09.05.19 @ 08:36) >  IMPRESSIONS:Normal Study  * Negative ECG evidence of ischemia after IV  administartion of Regadenoson.  * Myocardial Perfusion SPECT results are normal.  * No clear evidence of ischemia or infarct.  * Post-stress resting myocardial perfusion gated SPECT  imaging was performed (LVEF = 55 %) with no regional wall  motion abnormalities.    < end of copied text >        < end of copied text >    Imaging Personally Reviewed:  YES/NO    Consultant(s) Notes Reviewed:   YES/ No    Care Discussed with Consultants :     Plan of care was discussed with patient and /or primary care giver; all questions and concerns were addressed and care was aligned with patient's wishes.

## 2019-09-05 NOTE — PROGRESS NOTE ADULT - PROBLEM SELECTOR PLAN 7
IMPROVE VTE Individual Risk Assessment  RISK                                                                Points  [  ] Previous VTE                                                  3  [  ] Thrombophilia                                               2  [  ] Lower limb paralysis                                      2        (unable to hold up >15 seconds)    [  ] Current Cancer                                              2         (within 6 months)  [x  ] Immobilization > 24 hrs                                1  [  ] ICU/CCU stay > 24 hours                              1  [  ] Age > 60                                                      1  IMPROVE VTE Score ________1, heparin for DVT proph  Heparin not given today is pt is ambulating and platelets were low

## 2019-09-05 NOTE — PROGRESS NOTE ADULT - PROBLEM SELECTOR PLAN 1
-P/w Cough, SOB, Chest pain since yesterday with 40 pack year history of smoking   -CXR: Opacification/consolidation of the right lower lung field.   -CT chest: Soft tissue impaction of the right middle and lower lobe   bronchi. Extensive airspace consolidations of the right middle and lower   lobes. Overall findings may reflect severe multifocal pneumonia, however   close follow-up to in short resolution and exclude endobronchial mass.   Mild mediastinal adenopathy.  - Will c/w zosyn  -Duo neb   -Blood culture from 9/3 shows Kliebsella pneumonia    -ID Dr Hopkins following  CT chesT: concern for malignancy. Dr Guo consulted. Patient may need Bronchoscopy vs IR guided Biopsy depending on Pulmonary recommendation  -F.u repeat blood cultures  -urine cx negative  -HIV negative

## 2019-09-05 NOTE — PROGRESS NOTE ADULT - PROBLEM SELECTOR PLAN 2
p/w SOB with  wheezing   s/p :  Duo neb, Solu poli, Mg So4 in Ed  -CXR, CT chest; as above   -peak flow: 110 in ED   -C/w prednisone 20mg daily for 5 days  -c/w  Riley Guo following

## 2019-09-05 NOTE — SBIRT NOTE ADULT - NSSBIRTDRGACTIVEREFTXDET_GEN_A_CORE
Pt. refused to answer other questions.  She wants to go into inpatient tx for heroin abuse directly from hospital.  She states that she was supposed to go into detox program through [facility] ([place] [facility] this Tues 9/3/19 but was here.

## 2019-09-06 LAB
ALBUMIN SERPL ELPH-MCNC: 1.8 G/DL — LOW (ref 3.5–5)
ALP SERPL-CCNC: 88 U/L — SIGNIFICANT CHANGE UP (ref 40–120)
ALT FLD-CCNC: 49 U/L DA — SIGNIFICANT CHANGE UP (ref 10–60)
ANION GAP SERPL CALC-SCNC: 5 MMOL/L — SIGNIFICANT CHANGE UP (ref 5–17)
AST SERPL-CCNC: 48 U/L — HIGH (ref 10–40)
BILIRUB SERPL-MCNC: 0.7 MG/DL — SIGNIFICANT CHANGE UP (ref 0.2–1.2)
BUN SERPL-MCNC: 20 MG/DL — HIGH (ref 7–18)
CALCIUM SERPL-MCNC: 8.6 MG/DL — SIGNIFICANT CHANGE UP (ref 8.4–10.5)
CHLORIDE SERPL-SCNC: 107 MMOL/L — SIGNIFICANT CHANGE UP (ref 96–108)
CO2 SERPL-SCNC: 28 MMOL/L — SIGNIFICANT CHANGE UP (ref 22–31)
CREAT SERPL-MCNC: 0.67 MG/DL — SIGNIFICANT CHANGE UP (ref 0.5–1.3)
GLUCOSE SERPL-MCNC: 63 MG/DL — LOW (ref 70–99)
HCT VFR BLD CALC: 30.2 % — LOW (ref 34.5–45)
HGB BLD-MCNC: 9.9 G/DL — LOW (ref 11.5–15.5)
MAGNESIUM SERPL-MCNC: 2.1 MG/DL — SIGNIFICANT CHANGE UP (ref 1.6–2.6)
MCHC RBC-ENTMCNC: 30.9 PG — SIGNIFICANT CHANGE UP (ref 27–34)
MCHC RBC-ENTMCNC: 32.8 GM/DL — SIGNIFICANT CHANGE UP (ref 32–36)
MCV RBC AUTO: 94.4 FL — SIGNIFICANT CHANGE UP (ref 80–100)
NRBC # BLD: 0 /100 WBCS — SIGNIFICANT CHANGE UP (ref 0–0)
PHOSPHATE SERPL-MCNC: 2.5 MG/DL — SIGNIFICANT CHANGE UP (ref 2.5–4.5)
PLATELET # BLD AUTO: 61 K/UL — LOW (ref 150–400)
POTASSIUM SERPL-MCNC: 3.4 MMOL/L — LOW (ref 3.5–5.3)
POTASSIUM SERPL-SCNC: 3.4 MMOL/L — LOW (ref 3.5–5.3)
PROT SERPL-MCNC: 5.8 G/DL — LOW (ref 6–8.3)
RBC # BLD: 3.2 M/UL — LOW (ref 3.8–5.2)
RBC # FLD: 13.7 % — SIGNIFICANT CHANGE UP (ref 10.3–14.5)
SODIUM SERPL-SCNC: 140 MMOL/L — SIGNIFICANT CHANGE UP (ref 135–145)
WBC # BLD: 5.07 K/UL — SIGNIFICANT CHANGE UP (ref 3.8–10.5)
WBC # FLD AUTO: 5.07 K/UL — SIGNIFICANT CHANGE UP (ref 3.8–10.5)

## 2019-09-06 RX ORDER — POTASSIUM CHLORIDE 20 MEQ
40 PACKET (EA) ORAL ONCE
Refills: 0 | Status: COMPLETED | OUTPATIENT
Start: 2019-09-06 | End: 2019-09-06

## 2019-09-06 RX ORDER — SODIUM CHLORIDE 9 MG/ML
1000 INJECTION INTRAMUSCULAR; INTRAVENOUS; SUBCUTANEOUS ONCE
Refills: 0 | Status: COMPLETED | OUTPATIENT
Start: 2019-09-06 | End: 2019-09-06

## 2019-09-06 RX ADMIN — Medication 3 MILLILITER(S): at 09:32

## 2019-09-06 RX ADMIN — Medication 400 MILLIGRAM(S): at 08:12

## 2019-09-06 RX ADMIN — Medication 400 MILLIGRAM(S): at 15:00

## 2019-09-06 RX ADMIN — Medication 1 PATCH: at 11:08

## 2019-09-06 RX ADMIN — Medication 650 MILLIGRAM(S): at 02:00

## 2019-09-06 RX ADMIN — PIPERACILLIN AND TAZOBACTAM 25 GRAM(S): 4; .5 INJECTION, POWDER, LYOPHILIZED, FOR SOLUTION INTRAVENOUS at 21:48

## 2019-09-06 RX ADMIN — Medication 650 MILLIGRAM(S): at 01:25

## 2019-09-06 RX ADMIN — Medication 400 MILLIGRAM(S): at 22:20

## 2019-09-06 RX ADMIN — Medication 400 MILLIGRAM(S): at 21:50

## 2019-09-06 RX ADMIN — TRAMADOL HYDROCHLORIDE 50 MILLIGRAM(S): 50 TABLET ORAL at 08:29

## 2019-09-06 RX ADMIN — LIDOCAINE 1 PATCH: 4 CREAM TOPICAL at 03:09

## 2019-09-06 RX ADMIN — Medication 40 MILLIEQUIVALENT(S): at 09:39

## 2019-09-06 RX ADMIN — LORATADINE 10 MILLIGRAM(S): 10 TABLET ORAL at 11:09

## 2019-09-06 RX ADMIN — TRAMADOL HYDROCHLORIDE 50 MILLIGRAM(S): 50 TABLET ORAL at 09:29

## 2019-09-06 RX ADMIN — Medication 1 MILLIGRAM(S): at 11:09

## 2019-09-06 RX ADMIN — Medication 100 MILLIGRAM(S): at 08:22

## 2019-09-06 RX ADMIN — Medication 20 MILLIGRAM(S): at 05:25

## 2019-09-06 RX ADMIN — Medication 1 PATCH: at 13:44

## 2019-09-06 RX ADMIN — SENNA PLUS 1 TABLET(S): 8.6 TABLET ORAL at 21:50

## 2019-09-06 RX ADMIN — TRAMADOL HYDROCHLORIDE 50 MILLIGRAM(S): 50 TABLET ORAL at 20:01

## 2019-09-06 RX ADMIN — Medication 30 MILLILITER(S): at 22:00

## 2019-09-06 RX ADMIN — Medication 100 MILLIGRAM(S): at 16:46

## 2019-09-06 RX ADMIN — METHADONE HYDROCHLORIDE 55 MILLIGRAM(S): 40 TABLET ORAL at 11:08

## 2019-09-06 RX ADMIN — Medication 400 MILLIGRAM(S): at 13:46

## 2019-09-06 RX ADMIN — SODIUM CHLORIDE 1000 MILLILITER(S): 9 INJECTION INTRAMUSCULAR; INTRAVENOUS; SUBCUTANEOUS at 09:39

## 2019-09-06 RX ADMIN — HEPARIN SODIUM 5000 UNIT(S): 5000 INJECTION INTRAVENOUS; SUBCUTANEOUS at 21:50

## 2019-09-06 RX ADMIN — Medication 1 PATCH: at 19:00

## 2019-09-06 RX ADMIN — PIPERACILLIN AND TAZOBACTAM 25 GRAM(S): 4; .5 INJECTION, POWDER, LYOPHILIZED, FOR SOLUTION INTRAVENOUS at 13:46

## 2019-09-06 RX ADMIN — Medication 1000 UNIT(S): at 11:09

## 2019-09-06 RX ADMIN — Medication 3 MILLILITER(S): at 20:59

## 2019-09-06 RX ADMIN — Medication 1 PATCH: at 11:11

## 2019-09-06 RX ADMIN — Medication 400 MILLIGRAM(S): at 05:25

## 2019-09-06 RX ADMIN — PIPERACILLIN AND TAZOBACTAM 25 GRAM(S): 4; .5 INJECTION, POWDER, LYOPHILIZED, FOR SOLUTION INTRAVENOUS at 05:25

## 2019-09-06 RX ADMIN — Medication 1 PATCH: at 09:29

## 2019-09-06 RX ADMIN — TRAMADOL HYDROCHLORIDE 50 MILLIGRAM(S): 50 TABLET ORAL at 20:31

## 2019-09-06 RX ADMIN — Medication 1 PATCH: at 08:28

## 2019-09-06 NOTE — PROGRESS NOTE ADULT - SUBJECTIVE AND OBJECTIVE BOX
PGY 1 Note discussed with supervising resident and primary attending    Patient is a 54y old  Female who presents with a chief complaint of SOB, Right sided CP, cough since yesterday morning (03 Sep 2019 12:56)      INTERVAL HPI/OVERNIGHT EVENTS: Patient seen and examined at the bedside. Pt says lidocaine patch does not help and ibuprofen is better. Pt complaining of cough and pain.    MEDICATIONS  (STANDING):  ALBUTerol/ipratropium for Nebulization 3 milliLiter(s) Nebulizer every 6 hours  folic acid 1 milliGRAM(s) Oral daily  heparin  Injectable 5000 Unit(s) SubCutaneous every 8 hours  influenza   Vaccine 0.5 milliLiter(s) IntraMuscular once  loratadine 10 milliGRAM(s) Oral daily  methadone    Tablet 55 milliGRAM(s) Oral daily  methylPREDNISolone sodium succinate Injectable 40 milliGRAM(s) IV Push every 6 hours  piperacillin/tazobactam IVPB. 3.375 Gram(s) IV Intermittent once  piperacillin/tazobactam IVPB.. 3.375 Gram(s) IV Intermittent every 8 hours  sodium chloride 0.9%. 1000 milliLiter(s) (100 mL/Hr) IV Continuous <Continuous>    MEDICATIONS  (PRN):  acetaminophen   Tablet .. 650 milliGRAM(s) Oral every 6 hours PRN Temp greater or equal to 38C (100.4F), Mild Pain (1 - 3)  ALBUTerol/ipratropium for Nebulization 3 milliLiter(s) Nebulizer every 8 hours PRN Bronchospasm  guaiFENesin    Syrup 100 milliGRAM(s) Oral every 6 hours PRN Cough  ketorolac   Injectable 15 milliGRAM(s) IV Push every 8 hours PRN Moderate Pain (4 - 6)      __________________________________________________  REVIEW OF SYSTEMS:    CONSTITUTIONAL: No fever,   EYES: no acute visual disturbances  NECK: No pain or stiffness  RESPIRATORY: +cough; +shortness of breath  CARDIOVASCULAR: +chest pain, no palpitations  GASTROINTESTINAL: No pain. No nausea or vomiting; No diarrhea   NEUROLOGICAL: No headache or numbness, no tremors  MUSCULOSKELETAL: No joint pain, no muscle pain  GENITOURINARY: no dysuria, no frequency, no hesitancy  PSYCHIATRY: no depression , no anxiety  ALL OTHER  ROS negative        Vital Signs Last 24 Hrs  T(C): 37.1 (03 Sep 2019 11:09), Max: 37.1 (02 Sep 2019 15:00)  T(F): 98.7 (03 Sep 2019 11:09), Max: 98.8 (02 Sep 2019 15:00)  HR: 85 (03 Sep 2019 11:09) (81 - 110)  BP: 100/54 (03 Sep 2019 11:09) (89/62 - 103/68)  BP(mean): 75 (02 Sep 2019 15:00) (75 - 75)  RR: 18 (03 Sep 2019 11:09) (18 - 22)  SpO2: 98% (03 Sep 2019 11:09) (94% - 99%)    ________________________________________________  PHYSICAL EXAM:  GENERAL: NAD  HEENT: Normocephalic;  conjunctivae and sclerae clear; moist mucous membranes;   NECK : supple  CHEST/LUNG: Clear to auscultation bilaterally, decreased breath sounds on right base    HEART: S1 S2  regular; no murmurs, gallops or rubs  ABDOMEN: Soft, Nontender, Nondistended; Bowel sounds present  EXTREMITIES: no cyanosis; no edema; no calf tenderness  SKIN: warm and dry; no rash  NERVOUS SYSTEM:  Awake and alert; Oriented  to place, person and time ; no new deficits    _________________________________________________  LABS:                        11.0   2.98  )-----------( 106      ( 03 Sep 2019 06:47 )             33.1     09-03    134<L>  |  100  |  33<H>  ----------------------------<  109<H>  3.4<L>   |  26  |  1.21    Ca    8.2<L>      03 Sep 2019 06:47  Phos  3.0     09-03  Mg     1.8     09-03    TPro  6.1  /  Alb  2.1<L>  /  TBili  0.4  /  DBili  x   /  AST  52<H>  /  ALT  26  /  AlkPhos  42  09-03        CAPILLARY BLOOD GLUCOSE            RADIOLOGY & ADDITIONAL TESTS:    < from: CT Chest No Cont (09.02.19 @ 12:33) >  CHEST WALL AND LOWER NECK: Within normal limits  MEDIASTINUM AND GÓMEZ: Mild mediastinal and 2.2 cm subtracheal adenopathy.  HEART: Within normal limits  VESSELS: Within normal limits  LUNG AND LARGE AIRWAYS: Complete opacification of the right middle and   right lower lobe bronchi. Dense confluent airspace consolidation of the   right middle and right lower lobes. Patchy and tree-in-bud airspace   opacities right upper lobe. Paraseptal emphysema. Trace right pleural   effusion.  VISUALIZED UPPER ABDOMEN: Within normal limits.  BONES: Multiple old left-sided rib fractures.    IMPRESSION: Soft tissue impaction of the right middle and lower lobe   bronchi. Extensive airspace consolidations ofthe right middle and lower   lobes. Overall findings may reflect severe multifocal pneumonia, however   close follow-up to in short resolution and exclude endobronchial mass.   Mild mediastinal adenopathy.    < from: Transthoracic Echocardiogram (09.03.19 @ 07:20) >  CONCLUSIONS:  1. Normal mitral valve. Mild mitral regurgitation.  2. Normal trileaflet aortic valve.  3. Aortic Root: 3.2 cm.    4. Normal left atrium.  LA volume index = 23 cc/m2.  5. Normal left ventricular internal dimensions and wall  thicknesses.  6. Normal Left Ventricular Systolic Function,  (EF = 55 to  60%)  7. Grade II diastolic dysfunction.  8. Normal right atrium.  9. Normal right ventricular size and systolic function  (TAPSE  2.5cm).  10. RV systolic pressure is normal at  29 mm Hg.  11. There is mild tricuspid regurgitation.  12. Normal pulmonic valve.  13. Normal pericardium with no pericardial effusion.    < end of copied text >      < from: Nuclear Stress Test-Pharmacologic (09.05.19 @ 08:36) >  IMPRESSIONS:Normal Study  * Negative ECG evidence of ischemia after IV  administartion of Regadenoson.  * Myocardial Perfusion SPECT results are normal.  * No clear evidence of ischemia or infarct.  * Post-stress resting myocardial perfusion gated SPECT  imaging was performed (LVEF = 55 %) with no regional wall  motion abnormalities.    < end of copied text >        < end of copied text >    Imaging Personally Reviewed:  YES/NO    Consultant(s) Notes Reviewed:   YES/ No    Care Discussed with Consultants :     Plan of care was discussed with patient and /or primary care giver; all questions and concerns were addressed and care was aligned with patient's wishes.

## 2019-09-06 NOTE — PROGRESS NOTE ADULT - PROBLEM SELECTOR PLAN 3
p/w Right side chest pain, worse with ambulation, and lying on right side.  Tenderness present on palpation. CXR; right sided PNa   -EKG: no acute changes  -trops negative  -Echo showed grade 2 DD  -Stress test negative  -most likely due to costochondritis  -off tele  -Pain management consulted for pain

## 2019-09-06 NOTE — PROGRESS NOTE ADULT - SUBJECTIVE AND OBJECTIVE BOX
Time of Visit:  Patient seen and examined. laying in bed somfortable    MEDICATIONS  (STANDING):  ALBUTerol/ipratropium for Nebulization 3 milliLiter(s) Nebulizer every 6 hours  cholecalciferol 1000 Unit(s) Oral daily  docusate sodium 100 milliGRAM(s) Oral daily  folic acid 1 milliGRAM(s) Oral daily  heparin  Injectable 5000 Unit(s) SubCutaneous every 8 hours  ibuprofen  Tablet. 400 milliGRAM(s) Oral three times a day  influenza   Vaccine 0.5 milliLiter(s) IntraMuscular once  loratadine 10 milliGRAM(s) Oral daily  methadone    Tablet 55 milliGRAM(s) Oral daily  nicotine - 21 mG/24Hr(s) Patch 1 patch Transdermal daily  piperacillin/tazobactam IVPB. 3.375 Gram(s) IV Intermittent once  piperacillin/tazobactam IVPB.. 3.375 Gram(s) IV Intermittent every 8 hours  predniSONE   Tablet 20 milliGRAM(s) Oral daily  senna 1 Tablet(s) Oral at bedtime  sodium chloride 0.9%. 1000 milliLiter(s) (100 mL/Hr) IV Continuous <Continuous>      MEDICATIONS  (PRN):  acetaminophen   Tablet .. 650 milliGRAM(s) Oral every 6 hours PRN Temp greater or equal to 38C (100.4F), Mild Pain (1 - 3)  ALBUTerol/ipratropium for Nebulization 3 milliLiter(s) Nebulizer every 8 hours PRN Bronchospasm  guaiFENesin    Syrup 100 milliGRAM(s) Oral every 6 hours PRN Cough  traMADol 50 milliGRAM(s) Oral every 8 hours PRN Severe Pain (7 - 10)       Medications up to date at time of exam.      PHYSICAL EXAMINATION:  Patient has no new complaints.  GENERAL: The patient is a well-developed, well-nourished, in no apparent distress.     Vital Signs Last 24 Hrs  T(C): 36.9 (06 Sep 2019 15:13), Max: 36.9 (06 Sep 2019 15:13)  T(F): 98.4 (06 Sep 2019 15:13), Max: 98.4 (06 Sep 2019 15:13)  HR: 67 (06 Sep 2019 15:13) (67 - 91)  BP: 103/55 (06 Sep 2019 15:13) (92/56 - 117/59)  BP(mean): --  RR: 17 (06 Sep 2019 15:13) (17 - 18)  SpO2: 98% (06 Sep 2019 15:13) (96% - 98%)   (if applicable)    Chest Tube (if applicable)    HEENT: Head is normocephalic and atraumatic. Extraocular muscles are intact. Mucous membranes are moist.     NECK: Supple, no palpable adenopathy.    LUNGS: Clear to auscultation, no wheezing, rales, or rhonchi.    HEART: Regular rate and rhythm without murmur.    ABDOMEN: Soft, nontender, and nondistended.  No hepatosplenomegaly is noted.    : No painful voiding, no pelvic pain    EXTREMITIES: Without any cyanosis, clubbing, rash, lesions or edema.    NEUROLOGIC: Awake, alert, oriented, grossly intact    SKIN: Warm, dry, good turgor.      LABS:                        9.9    5.07  )-----------( 61       ( 06 Sep 2019 07:03 )             30.2     09-06    140  |  107  |  20<H>  ----------------------------<  63<L>  3.4<L>   |  28  |  0.67    Ca    8.6      06 Sep 2019 07:03  Phos  2.5     09-06  Mg     2.1     09-06    TPro  5.8<L>  /  Alb  1.8<L>  /  TBili  0.7  /  DBili  x   /  AST  48<H>  /  ALT  49  /  AlkPhos  88  09-06                        MICROBIOLOGY: (if applicable)    RADIOLOGY & ADDITIONAL STUDIES:  EKG:   CXR:  ECHO:    IMPRESSION: 54y Female PAST MEDICAL & SURGICAL HISTORY:  Asthma  No significant past surgical history   p/w         IMP: This is 54 yr old woman active  smoker ( 40 pack years)  on methadone for heroine use admitted with pna.  CT chest show RML bronchus obstruction with mediastinal adenopathy may be due to pan vs malignancy    +klebsiella pneum bacteremia 9/3 repeat BC 9/5 neg  HIV negative  i discussed the possibility of bronch if no improvement on cxr with antibx with pat   RECOMMENDATIONS:  - con't with antibiotics as per ID recommendations.   -methadone  -dupnebs q6h  -may need bronchoscopy if no improvement  -dvt/gi prophy  -prednisone 20 mg daily x 5 days started 9/3  -advise to stop smoking  -out pat pul f/u  -nicotine patch      time spent 38 min

## 2019-09-06 NOTE — PROGRESS NOTE ADULT - ASSESSMENT
53 yo female with PMH of Asthma, presented with SOB, cough and right sided chest pain since yesterday morning. She started feeling short of breath, present at rest, not relieved with Albuterol inhaler, unable to ambulate due to SOB since yesterday. It was associated with wheeze and cough with whitish sputum. She also complains of subjective fever. Chest pain is present on right lower side, 7/10 in severity, on/off, non radiating, stabbing in nature, worse with sitting and moving and lying on that side, relieved with Motrin.     Admitted for Right sided PNA, Asthma exacerbation and concern for Malignancy     GOC; Full code

## 2019-09-06 NOTE — PROGRESS NOTE ADULT - PROBLEM SELECTOR PLAN 1
-P/w Cough, SOB, Chest pain since yesterday with 40 pack year history of smoking   -CXR: Opacification/consolidation of the right lower lung field.   -CT chest: Soft tissue impaction of the right middle and lower lobe   bronchi. Extensive airspace consolidations of the right middle and lower   lobes. Overall findings may reflect severe multifocal pneumonia, however   close follow-up to in short resolution and exclude endobronchial mass.   Mild mediastinal adenopathy.  - Will c/w zosyn (Day 5 today)  -Duo neb   -Blood culture from 9/3 shows Kliebsella pneumonia    -ID Dr Hopkins following  CT chesT: concern for malignancy. Dr Guo consulted. Patient may need Bronchoscopy vs IR guided Biopsy depending on Pulmonary recommendation  -F.u repeat blood cultures  -urine cx negative  -HIV negative

## 2019-09-06 NOTE — PROGRESS NOTE ADULT - SUBJECTIVE AND OBJECTIVE BOX
54y Female is under our care for     REVIEW OF SYSTEMS:  [  ] Not able to illicit  General:	  Chest:	  GI:	  :  Skin:	  Musculoskeletal:	  Neuro:	    MEDS:  piperacillin/tazobactam IVPB. 3.375 Gram(s) IV Intermittent once  piperacillin/tazobactam IVPB.. 3.375 Gram(s) IV Intermittent every 8 hours    ALLERGIES: Allergies    No Known Allergies    Intolerances        VITALS:  Vital Signs Last 24 Hrs  T(C): 36.9 (06 Sep 2019 15:13), Max: 36.9 (06 Sep 2019 15:13)  T(F): 98.4 (06 Sep 2019 15:13), Max: 98.4 (06 Sep 2019 15:13)  HR: 67 (06 Sep 2019 15:13) (67 - 91)  BP: 103/55 (06 Sep 2019 15:13) (92/56 - 117/59)  BP(mean): --  RR: 17 (06 Sep 2019 15:13) (17 - 18)  SpO2: 98% (06 Sep 2019 15:13) (96% - 98%)      PHYSICAL EXAM:  HEENT:  Neck:  Respiratory:  Cardiovascular:  Gastrointestinal:  Extremities:  Skin:  Ortho:  Neuro:    LABS/DIAGNOSTIC TESTS:                        9.9    5.07  )-----------( 61       ( 06 Sep 2019 07:03 )             30.2     WBC Count: 5.07 K/uL (09-06 @ 07:03)  WBC Count: 5.68 K/uL (09-05 @ 12:30)  WBC Count: 5.12 K/uL (09-05 @ 07:10)  WBC Count: 5.82 K/uL (09-04 @ 07:17)  WBC Count: 2.98 K/uL (09-03 @ 06:47)    09-06    140  |  107  |  20<H>  ----------------------------<  63<L>  3.4<L>   |  28  |  0.67    Ca    8.6      06 Sep 2019 07:03  Phos  2.5     09-06  Mg     2.1     09-06    TPro  5.8<L>  /  Alb  1.8<L>  /  TBili  0.7  /  DBili  x   /  AST  48<H>  /  ALT  49  /  AlkPhos  88  09-06      CULTURES:   .Blood  09-05 @ 11:46   No growth to date.  --  --      .Urine  09-04 @ 01:22   No growth  --  --      .Blood  09-03 @ 01:11   Growth in aerobic and anaerobic bottles: Klebsiella pneumoniae  "Due to technical problems, Proteus sp. will Not be reported as part of  the BCID panel until further notice"  ***Blood Panel PCR results on this specimen are available  approximately 3 hours after the Gram stain result.***  Gram stain, PCR, and/or culture results may not always  correspond due to difference in methodologies.  ************************************************************  This PCR assay was performed using [employer].  The following targets are tested for: Enterococcus,  vancomycin resistant enterococci, Listeria monocytogenes,  coagulase negative staphylococci, S. aureus,  methicillin resistant S. aureus, Streptococcus agalactiae  (Group B), S. pneumoniae, S. pyogenes (Group A),  Acinetobacter baumannii, Enterobacter cloacae, E. coli,  Klebsiella oxytoca, K. pneumoniae, Proteus sp.,  Serratia marcescens, Haemophilus influenzae,  Neisseria meningitidis, Pseudomonas aeruginosa, Candida  albicans, C. glabrata, C krusei, C parapsilosis,  C. tropicalis and the KPC resistance gene.  --  Blood Culture PCR  Klebsiella pneumoniae        RADIOLOGY:  no new studies 54y Female is under our care for right sided multifocal pneumonia and bacteremia. Patient is doing well, but still admits to lingering productive cough and right chest pain. Repeat BC are with no growth so far.      REVIEW OF SYSTEMS:  [  ] Not able to illicit  General: no fevers no malaise  Chest: +cough no sob +CP  GI: no nvd  : no urinary sxs   Skin: no rashes  Neuro: no ha's no dizziness     MEDS:  piperacillin/tazobactam IVPB. 3.375 Gram(s) IV Intermittent once  piperacillin/tazobactam IVPB.. 3.375 Gram(s) IV Intermittent every 8 hours    ALLERGIES: Allergies    No Known Allergies    Intolerances      VITALS:  Vital Signs Last 24 Hrs  T(C): 36.9 (06 Sep 2019 15:13), Max: 36.9 (06 Sep 2019 15:13)  T(F): 98.4 (06 Sep 2019 15:13), Max: 98.4 (06 Sep 2019 15:13)  HR: 67 (06 Sep 2019 15:13) (67 - 91)  BP: 103/55 (06 Sep 2019 15:13) (92/56 - 117/59)  BP(mean): --  RR: 17 (06 Sep 2019 15:13) (17 - 18)  SpO2: 98% (06 Sep 2019 15:13) (96% - 98%)      PHYSICAL EXAM:  HEENT: n/a  Neck: supple no LN's   Respiratory: right mid to low lung base rales no rhonchi   Cardiovascular: S1 S2 reg no murmurs  Gastrointestinal: +BS with soft, nondistended abdomen; no tenderness  Extremities: no edema   Skin: no new rashes  Ortho: n/a  Neuro: AAO x 3      LABS/DIAGNOSTIC TESTS:                        9.9    5.07  )-----------( 61       ( 06 Sep 2019 07:03 )             30.2     WBC Count: 5.07 K/uL (09-06 @ 07:03)  WBC Count: 5.68 K/uL (09-05 @ 12:30)  WBC Count: 5.12 K/uL (09-05 @ 07:10)  WBC Count: 5.82 K/uL (09-04 @ 07:17)  WBC Count: 2.98 K/uL (09-03 @ 06:47)    09-06    140  |  107  |  20<H>  ----------------------------<  63<L>  3.4<L>   |  28  |  0.67    Ca    8.6      06 Sep 2019 07:03  Phos  2.5     09-06  Mg     2.1     09-06    TPro  5.8<L>  /  Alb  1.8<L>  /  TBili  0.7  /  DBili  x   /  AST  48<H>  /  ALT  49  /  AlkPhos  88  09-06      CULTURES:   .Blood  09-05 @ 11:46   No growth to date.  --  --      .Urine  09-04 @ 01:22   No growth  --  --      .Blood  09-03 @ 01:11   Growth in aerobic and anaerobic bottles: Klebsiella pneumoniae  "Due to technical problems, Proteus sp. will Not be reported as part of  the BCID panel until further notice"  ***Blood Panel PCR results on this specimen are available  approximately 3 hours after the Gram stain result.***  Gram stain, PCR, and/or culture results may not always  correspond due to difference in methodologies.  ************************************************************  This PCR assay was performed using [employer].  The following targets are tested for: Enterococcus,  vancomycin resistant enterococci, Listeria monocytogenes,  coagulase negative staphylococci, S. aureus,  methicillin resistant S. aureus, Streptococcus agalactiae  (Group B), S. pneumoniae, S. pyogenes (Group A),  Acinetobacter baumannii, Enterobacter cloacae, E. coli,  Klebsiella oxytoca, K. pneumoniae, Proteus sp.,  Serratia marcescens, Haemophilus influenzae,  Neisseria meningitidis, Pseudomonas aeruginosa, Candida  albicans, C. glabrata, C krusei, C parapsilosis,  C. tropicalis and the KPC resistance gene.  --  Blood Culture PCR  Klebsiella pneumoniae        RADIOLOGY:  no new studies

## 2019-09-06 NOTE — PROGRESS NOTE ADULT - ASSESSMENT
1.	Multifocal pneumonia   2.	Klebisella pneumoniae bacteremia - likely from UTI  3.	UTI  ·	cont zosyn 3.375gm IV q8h  D5  ·	possible dc planning tomorrow on PO antibiotics if BC remain negative 1.	Multifocal pneumonia   2.	Klebisella pneumoniae bacteremia - likely from UTI  3.	UTI  ·	cont zosyn 3.375gm IV q8h  D5  ·	possible dc planning tomorrow on PO antibiotics if BC remain negative on ceftin 500mgs po bid x 8 days.

## 2019-09-06 NOTE — PROGRESS NOTE ADULT - PROBLEM SELECTOR PLAN 2
p/w SOB with  wheezing   s/p :  Duo neb, Solu poli, Mg So4 in Ed  -CXR, CT chest; as above   -C/w prednisone 20mg daily for 5 days  -c/w  Riley Guo following

## 2019-09-06 NOTE — PROGRESS NOTE ADULT - SUBJECTIVE AND OBJECTIVE BOX
Patient denies chest pain or shortness of breath.   Review of systems otherwise (-)  	  acetaminophen   Tablet .. 650 milliGRAM(s) Oral every 6 hours PRN  ALBUTerol/ipratropium for Nebulization 3 milliLiter(s) Nebulizer every 8 hours PRN  ALBUTerol/ipratropium for Nebulization 3 milliLiter(s) Nebulizer every 6 hours  cholecalciferol 1000 Unit(s) Oral daily  docusate sodium 100 milliGRAM(s) Oral daily  folic acid 1 milliGRAM(s) Oral daily  guaiFENesin    Syrup 100 milliGRAM(s) Oral every 6 hours PRN  heparin  Injectable 5000 Unit(s) SubCutaneous every 8 hours  ibuprofen  Tablet. 400 milliGRAM(s) Oral three times a day  influenza   Vaccine 0.5 milliLiter(s) IntraMuscular once  loratadine 10 milliGRAM(s) Oral daily  methadone    Tablet 55 milliGRAM(s) Oral daily  nicotine - 21 mG/24Hr(s) Patch 1 patch Transdermal daily  piperacillin/tazobactam IVPB. 3.375 Gram(s) IV Intermittent once  piperacillin/tazobactam IVPB.. 3.375 Gram(s) IV Intermittent every 8 hours  predniSONE   Tablet 20 milliGRAM(s) Oral daily  senna 1 Tablet(s) Oral at bedtime  sodium chloride 0.9%. 1000 milliLiter(s) IV Continuous <Continuous>  traMADol 50 milliGRAM(s) Oral every 8 hours PRN                            9.9    5.07  )-----------( 61       ( 06 Sep 2019 07:03 )             30.2       Hemoglobin: 9.9 g/dL (09-06 @ 07:03)  Hemoglobin: 11.0 g/dL (09-05 @ 12:30)  Hemoglobin: 10.0 g/dL (09-05 @ 07:10)  Hemoglobin: 10.6 g/dL (09-04 @ 07:17)  Hemoglobin: 11.0 g/dL (09-03 @ 06:47)      09-06    140  |  107  |  20<H>  ----------------------------<  63<L>  3.4<L>   |  28  |  0.67    Ca    8.6      06 Sep 2019 07:03  Phos  2.5     09-06  Mg     2.1     09-06    TPro  5.8<L>  /  Alb  1.8<L>  /  TBili  0.7  /  DBili  x   /  AST  48<H>  /  ALT  49  /  AlkPhos  88  09-06    Creatinine Trend: 0.67<--, 0.82<--, 1.04<--, 1.21<--, 1.00<--    COAGS:           T(C): 36.8 (09-06-19 @ 07:41), Max: 36.8 (09-05-19 @ 11:31)  HR: 85 (09-06-19 @ 10:21) (73 - 91)  BP: 97/52 (09-06-19 @ 07:41) (92/56 - 120/66)  RR: 17 (09-06-19 @ 07:41) (17 - 18)  SpO2: 96% (09-06-19 @ 10:21) (96% - 98%)  Wt(kg): --    I&O's Summary        Gen: Appears well in NAD  HEENT:  (-)icterus (-)pallor  CV: N S1 S2 1/6 ERYN (+)2 Pulses B/l  Resp:  Clear to ausculatation B/L, normal effort  GI: (+) BS Soft, NT, ND  Lymph:  (-)Edema, (-)obvious lymphadenopathy  Skin: Warm to touch, Normal turgor  Psych: Appropriate mood and affect      TELEMETRY: 	  OFF        ASSESSMENT/PLAN: 	54y  Female PMH of Asthma, presented with SOB, cough and right sided chest pain since yesterday morning. She started feeling short of breath, present at rest, not relieved with Albuterol inhaler, unable to ambulate due to SOB since yesterday. It was associated with wheeze and cough with whitish sputum found with right lower lobe consolidation and klebsiella bacteremia.    - Echo with no pertinent findings  - ID eval noted likely UTI  - Stress test with no ischemia  - No need for further inpatient cardiac work up.  - Abx per ID  - Pulmonary f/u    Rashid Roberts MD, [facility]  BEEPER [phone]

## 2019-09-07 LAB
ALBUMIN SERPL ELPH-MCNC: 1.7 G/DL — LOW (ref 3.5–5)
ALP SERPL-CCNC: 114 U/L — SIGNIFICANT CHANGE UP (ref 40–120)
ALT FLD-CCNC: 82 U/L DA — HIGH (ref 10–60)
ANION GAP SERPL CALC-SCNC: 4 MMOL/L — LOW (ref 5–17)
AST SERPL-CCNC: 61 U/L — HIGH (ref 10–40)
BILIRUB SERPL-MCNC: 1 MG/DL — SIGNIFICANT CHANGE UP (ref 0.2–1.2)
BUN SERPL-MCNC: 13 MG/DL — SIGNIFICANT CHANGE UP (ref 7–18)
CALCIUM SERPL-MCNC: 8.6 MG/DL — SIGNIFICANT CHANGE UP (ref 8.4–10.5)
CHLORIDE SERPL-SCNC: 109 MMOL/L — HIGH (ref 96–108)
CO2 SERPL-SCNC: 29 MMOL/L — SIGNIFICANT CHANGE UP (ref 22–31)
CREAT SERPL-MCNC: 0.57 MG/DL — SIGNIFICANT CHANGE UP (ref 0.5–1.3)
FERRITIN SERPL-MCNC: 753 NG/ML — HIGH (ref 15–150)
GLUCOSE SERPL-MCNC: 76 MG/DL — SIGNIFICANT CHANGE UP (ref 70–99)
HCT VFR BLD CALC: 29.4 % — LOW (ref 34.5–45)
HEPARIN-PF4 AB RESULT: <0.6 U/ML — SIGNIFICANT CHANGE UP (ref 0–0.9)
HGB BLD-MCNC: 9.5 G/DL — LOW (ref 11.5–15.5)
IRON SATN MFR SERPL: 14 UG/DL — LOW (ref 40–150)
IRON SATN MFR SERPL: 6 % — LOW (ref 15–50)
MAGNESIUM SERPL-MCNC: 1.6 MG/DL — SIGNIFICANT CHANGE UP (ref 1.6–2.6)
MCHC RBC-ENTMCNC: 30.7 PG — SIGNIFICANT CHANGE UP (ref 27–34)
MCHC RBC-ENTMCNC: 32.3 GM/DL — SIGNIFICANT CHANGE UP (ref 32–36)
MCV RBC AUTO: 95.1 FL — SIGNIFICANT CHANGE UP (ref 80–100)
NRBC # BLD: 0 /100 WBCS — SIGNIFICANT CHANGE UP (ref 0–0)
PF4 HEPARIN CMPLX AB SER-ACNC: NEGATIVE — SIGNIFICANT CHANGE UP
PHOSPHATE SERPL-MCNC: 2.9 MG/DL — SIGNIFICANT CHANGE UP (ref 2.5–4.5)
PLATELET # BLD AUTO: 78 K/UL — LOW (ref 150–400)
POTASSIUM SERPL-MCNC: 4 MMOL/L — SIGNIFICANT CHANGE UP (ref 3.5–5.3)
POTASSIUM SERPL-SCNC: 4 MMOL/L — SIGNIFICANT CHANGE UP (ref 3.5–5.3)
PROT SERPL-MCNC: 6 G/DL — SIGNIFICANT CHANGE UP (ref 6–8.3)
RBC # BLD: 3.09 M/UL — LOW (ref 3.8–5.2)
RBC # FLD: 14.3 % — SIGNIFICANT CHANGE UP (ref 10.3–14.5)
SODIUM SERPL-SCNC: 142 MMOL/L — SIGNIFICANT CHANGE UP (ref 135–145)
TIBC SERPL-MCNC: 217 UG/DL — LOW (ref 250–450)
UIBC SERPL-MCNC: 203 UG/DL — SIGNIFICANT CHANGE UP (ref 110–370)
WBC # BLD: 6.66 K/UL — SIGNIFICANT CHANGE UP (ref 3.8–10.5)
WBC # FLD AUTO: 6.66 K/UL — SIGNIFICANT CHANGE UP (ref 3.8–10.5)

## 2019-09-07 RX ADMIN — METHADONE HYDROCHLORIDE 55 MILLIGRAM(S): 40 TABLET ORAL at 11:03

## 2019-09-07 RX ADMIN — Medication 400 MILLIGRAM(S): at 13:25

## 2019-09-07 RX ADMIN — Medication 400 MILLIGRAM(S): at 06:17

## 2019-09-07 RX ADMIN — PIPERACILLIN AND TAZOBACTAM 25 GRAM(S): 4; .5 INJECTION, POWDER, LYOPHILIZED, FOR SOLUTION INTRAVENOUS at 13:25

## 2019-09-07 RX ADMIN — Medication 1 PATCH: at 11:01

## 2019-09-07 RX ADMIN — Medication 100 MILLIGRAM(S): at 09:40

## 2019-09-07 RX ADMIN — Medication 100 MILLIGRAM(S): at 01:57

## 2019-09-07 RX ADMIN — Medication 1000 UNIT(S): at 11:03

## 2019-09-07 RX ADMIN — Medication 1 MILLIGRAM(S): at 11:05

## 2019-09-07 RX ADMIN — HEPARIN SODIUM 5000 UNIT(S): 5000 INJECTION INTRAVENOUS; SUBCUTANEOUS at 05:47

## 2019-09-07 RX ADMIN — Medication 3 MILLILITER(S): at 14:49

## 2019-09-07 RX ADMIN — Medication 400 MILLIGRAM(S): at 17:15

## 2019-09-07 RX ADMIN — Medication 1 PATCH: at 21:00

## 2019-09-07 RX ADMIN — SENNA PLUS 1 TABLET(S): 8.6 TABLET ORAL at 21:08

## 2019-09-07 RX ADMIN — Medication 1 PATCH: at 09:36

## 2019-09-07 RX ADMIN — Medication 3 MILLILITER(S): at 20:25

## 2019-09-07 RX ADMIN — Medication 650 MILLIGRAM(S): at 01:57

## 2019-09-07 RX ADMIN — Medication 1 PATCH: at 11:03

## 2019-09-07 RX ADMIN — Medication 650 MILLIGRAM(S): at 17:27

## 2019-09-07 RX ADMIN — PIPERACILLIN AND TAZOBACTAM 25 GRAM(S): 4; .5 INJECTION, POWDER, LYOPHILIZED, FOR SOLUTION INTRAVENOUS at 21:07

## 2019-09-07 RX ADMIN — Medication 400 MILLIGRAM(S): at 05:47

## 2019-09-07 RX ADMIN — Medication 650 MILLIGRAM(S): at 18:16

## 2019-09-07 RX ADMIN — PIPERACILLIN AND TAZOBACTAM 25 GRAM(S): 4; .5 INJECTION, POWDER, LYOPHILIZED, FOR SOLUTION INTRAVENOUS at 05:47

## 2019-09-07 RX ADMIN — TRAMADOL HYDROCHLORIDE 50 MILLIGRAM(S): 50 TABLET ORAL at 09:40

## 2019-09-07 RX ADMIN — TRAMADOL HYDROCHLORIDE 50 MILLIGRAM(S): 50 TABLET ORAL at 10:40

## 2019-09-07 RX ADMIN — LORATADINE 10 MILLIGRAM(S): 10 TABLET ORAL at 11:03

## 2019-09-07 RX ADMIN — Medication 400 MILLIGRAM(S): at 22:00

## 2019-09-07 RX ADMIN — Medication 20 MILLIGRAM(S): at 05:47

## 2019-09-07 RX ADMIN — Medication 650 MILLIGRAM(S): at 02:27

## 2019-09-07 RX ADMIN — Medication 100 MILLIGRAM(S): at 11:03

## 2019-09-07 RX ADMIN — Medication 400 MILLIGRAM(S): at 21:08

## 2019-09-07 RX ADMIN — Medication 100 MILLIGRAM(S): at 17:26

## 2019-09-07 NOTE — PROGRESS NOTE ADULT - SUBJECTIVE AND OBJECTIVE BOX
Patient is a 54y old  Female who presents with a chief complaint of SOB, Right sided CP, cough since yesterday morning (07 Sep 2019 07:04)    PATIENT IS SEEN AND EXAMINED IN MEDICAL FLOOR.  ALLERGIES:  No Known Allergies  Daily Weight in k.5 (07 Sep 2019 04:50)    VITALS:    Vital Signs Last 24 Hrs  T(C): 36.9 (07 Sep 2019 15:18), Max: 37.3 (07 Sep 2019 11:14)  T(F): 98.4 (07 Sep 2019 15:18), Max: 99.1 (07 Sep 2019 11:14)  HR: 75 (07 Sep 2019 15:30) (73 - 86)  BP: 99/63 (07 Sep 2019 15:18) (97/54 - 116/60)  BP(mean): --  RR: 18 (07 Sep 2019 15:18) (17 - 18)  SpO2: 98% (07 Sep 2019 15:30) (95% - 98%)    LABS:    CBC Full  -  ( 07 Sep 2019 08:03 )  WBC Count : 6.66 K/uL  RBC Count : 3.09 M/uL  Hemoglobin : 9.5 g/dL  Hematocrit : 29.4 %  Platelet Count - Automated : 78 K/uL  Mean Cell Volume : 95.1 fl  Mean Cell Hemoglobin : 30.7 pg  Mean Cell Hemoglobin Concentration : 32.3 gm/dL  Auto Neutrophil # : x  Auto Lymphocyte # : x  Auto Monocyte # : x  Auto Eosinophil # : x  Auto Basophil # : x  Auto Neutrophil % : x  Auto Lymphocyte % : x  Auto Monocyte % : x  Auto Eosinophil % : x  Auto Basophil % : x          142  |  109<H>  |  13  ----------------------------<  76  4.0   |  29  |  0.57    Ca    8.6      07 Sep 2019 08:03  Phos  2.9     09-07  Mg     1.6     09-07    TPro  6.0  /  Alb  1.7<L>  /  TBili  1.0  /  DBili  x   /  AST  61<H>  /  ALT  82<H>  /  AlkPhos  114  09-07    LIVER FUNCTIONS - ( 07 Sep 2019 08:03 )  Alb: 1.7 g/dL / Pro: 6.0 g/dL / ALK PHOS: 114 U/L / ALT: 82 U/L DA / AST: 61 U/L / GGT: x           Creatinine Trend: 0.57<--, 0.67<--, 0.82<--, 1.04<--, 1.21<--, 1.00<--  I&O's Summary    06 Sep 2019 07:  -  07 Sep 2019 07:00  --------------------------------------------------------  IN: 680 mL / OUT: 0 mL / NET: 680 mL    07 Sep 2019 07:  -  07 Sep 2019 16:22  --------------------------------------------------------  IN: 430 mL / OUT: 0 mL / NET: 430 mL    .Blood   @ 11:46   No growth to date.  --  --      .Urine   @ 01:22   No growth  --  --      .Blood   @ 01:11   Growth in aerobic and anaerobic bottles: Klebsiella pneumoniae  "Due to technical problems, Proteus sp. will Not be reported as part of  the BCID panel until further notice"  ***Blood Panel PCR results on this specimen are available  approximately 3 hours after the Gram stain result.***  Gram stain, PCR, and/or culture results may not always  correspond due to difference in methodologies.  ************************************************************  This PCR assay was performed using [employer].  The following targets are tested for: Enterococcus,  vancomycin resistant enterococci, Listeria monocytogenes,  coagulase negative staphylococci, S. aureus,  methicillin resistant S. aureus, Streptococcus agalactiae  (Group B), S. pneumoniae, S. pyogenes (Group A),  Acinetobacter baumannii, Enterobacter cloacae, E. coli,  Klebsiella oxytoca, K. pneumoniae, Proteus sp.,  Serratia marcescens, Haemophilus influenzae,  Neisseria meningitidis, Pseudomonas aeruginosa, Candida  albicans, C. glabrata, C krusei, C parapsilosis,  C. tropicalis and the KPC resistance gene.  --  Blood Culture PCR  Klebsiella pneumoniae        MEDICATIONS:    MEDICATIONS  (STANDING):  ALBUTerol/ipratropium for Nebulization 3 milliLiter(s) Nebulizer every 6 hours  cholecalciferol 1000 Unit(s) Oral daily  docusate sodium 100 milliGRAM(s) Oral daily  folic acid 1 milliGRAM(s) Oral daily  heparin  Injectable 5000 Unit(s) SubCutaneous every 8 hours  ibuprofen  Tablet. 400 milliGRAM(s) Oral three times a day  influenza   Vaccine 0.5 milliLiter(s) IntraMuscular once  loratadine 10 milliGRAM(s) Oral daily  methadone    Tablet 55 milliGRAM(s) Oral daily  nicotine - 21 mG/24Hr(s) Patch 1 patch Transdermal daily  piperacillin/tazobactam IVPB. 3.375 Gram(s) IV Intermittent once  piperacillin/tazobactam IVPB.. 3.375 Gram(s) IV Intermittent every 8 hours  predniSONE   Tablet 20 milliGRAM(s) Oral daily  senna 1 Tablet(s) Oral at bedtime  sodium chloride 0.9%. 1000 milliLiter(s) (100 mL/Hr) IV Continuous <Continuous>      MEDICATIONS  (PRN):  acetaminophen   Tablet .. 650 milliGRAM(s) Oral every 6 hours PRN Temp greater or equal to 38C (100.4F), Mild Pain (1 - 3)  ALBUTerol/ipratropium for Nebulization 3 milliLiter(s) Nebulizer every 8 hours PRN Bronchospasm  aluminum hydroxide/magnesium hydroxide/simethicone Suspension 30 milliLiter(s) Oral every 6 hours PRN Dyspepsia  guaiFENesin    Syrup 100 milliGRAM(s) Oral every 6 hours PRN Cough  traMADol 50 milliGRAM(s) Oral every 8 hours PRN Severe Pain (7 - 10)      REVIEW OF SYSTEMS:                           ALL ROS DONE [ X   ]    CONSTITUTIONAL:  LETHARGIC [   ], FEVER [   ], UNRESPONSIVE [   ]  CVS:  CP  [   ], SOB, [   ], PALPITATIONS [   ], DIZZYNESS [   ]  RS: COUGH [   ], SPUTUM [   ]  GI: ABDOMINAL PAIN [   ], NAUSEA [   ], VOMITINGS [   ], DIARRHEA [   ], CONSTIPATION [   ]  :  DYSURIA [   ], NOCTURIA [   ], INCREASED FREQUENCY [   ], DRIBLING [   ],  SKELETAL: PAINFUL JOINTS [   ], SWOLLEN JOINTS [   ], NECK ACHE [   ], LOW BACK ACHE [   ],  SKIN : ULCERS [   ], RASH [   ], ITCHING [   ]  CNS: HEAD ACHE [   ], DOUBLE VISION [   ], BLURRED VISION [   ], AMS / CONFUSION [   ], SEIZURES [   ], WEAKNESS [   ],TINGLING / NUMBNESS [   ]    PHYSICAL EXAMINATION:  GENERAL APPEARANCE: NO DISTRESS  HEENT:  NO PALLOR, NO  JVD,  NO   NODES, NECK SUPPLE  CVS: S1 +, S2 +,   RS: AEEB,  OCCASIONAL  RALES +,   NO RONCHI  ABD: SOFT, NT, NO, BS +  EXT: NO PE  SKIN: WARM,   SKELETAL:  ROM ACCEPTABLE  CNS:  AAO X 3   , NO  DEFICITS    RADIOLOGY :  < from: CT Chest No Cont (19 @ 12:33) >  IMPRESSION: Soft tissue impaction of the right middle and lower lobe   bronchi. Extensive airspace consolidations ofthe right middle and lower   lobes. Overall findings may reflect severe multifocal pneumonia, however   close follow-up to in short resolution and exclude endobronchial mass.   Mild mediastinal adenopathy.    < end of copied text >      ASSESSMENT :     Lobar pneumonia  Asthma      PLAN:  HPI:  53 yo female with PMH of Asthma, presented with SOB, cough and right sided chest pain since yesterday morning. She started feeling short of breath, present at rest, not relieved with Albuterol inhaler, unable to ambulate due to SOB since yesterday. It was associated with wheeze and cough with whitish sputum. She also complains of subjective fever. Chest pain is present on right lower side, 7/10 in severity, on/off, non radiating, stabbing in nature, worse with sitting and moving and lying on that side, relieved with Motrin.     Off note, she quit Heroin abuse on  and since then has been on Methadone 55mg OD. She goes to the Methadone clinic every day, 6 days a week for her Methadone dose. Clinic #: [phone]-Ext: [phone]. She has taken her dose for today. Primary team to call Clinic to confirm dose. Clinic was closed today. (02 Sep 2019 13:00)    - MULTIFOCAL PNEUMONIA, SUSPECT ASPIRATION PNEUMONIA, BACTEREMIA  ON IV ZOSYN, STEROIDS ( USE  ORAL PREDNISONE 40 MG AND TAPER IT OFF )   - CHEST PAIN, PLEURITIC  ON TRAMADOL, TYLENOL  - PULMONARY F/UP IS IN PROGRESS. MAY NEED BRONCHOSCOPY  - SUBSTANCE ABUSE , SW TO COORDINATE WITH PATIENT FOR OUT PATIENT REHAB   - GI AND DVT PROPHYLAXIS  - DR. TOVAR

## 2019-09-07 NOTE — PROGRESS NOTE ADULT - SUBJECTIVE AND OBJECTIVE BOX
pt seen and examined, no complaints, ROS - .          acetaminophen   Tablet .. 650 milliGRAM(s) Oral every 6 hours PRN  ALBUTerol/ipratropium for Nebulization 3 milliLiter(s) Nebulizer every 8 hours PRN  ALBUTerol/ipratropium for Nebulization 3 milliLiter(s) Nebulizer every 6 hours  aluminum hydroxide/magnesium hydroxide/simethicone Suspension 30 milliLiter(s) Oral every 6 hours PRN  cholecalciferol 1000 Unit(s) Oral daily  docusate sodium 100 milliGRAM(s) Oral daily  folic acid 1 milliGRAM(s) Oral daily  guaiFENesin    Syrup 100 milliGRAM(s) Oral every 6 hours PRN  heparin  Injectable 5000 Unit(s) SubCutaneous every 8 hours  ibuprofen  Tablet. 400 milliGRAM(s) Oral three times a day  influenza   Vaccine 0.5 milliLiter(s) IntraMuscular once  loratadine 10 milliGRAM(s) Oral daily  methadone    Tablet 55 milliGRAM(s) Oral daily  nicotine - 21 mG/24Hr(s) Patch 1 patch Transdermal daily  piperacillin/tazobactam IVPB. 3.375 Gram(s) IV Intermittent once  piperacillin/tazobactam IVPB.. 3.375 Gram(s) IV Intermittent every 8 hours  predniSONE   Tablet 20 milliGRAM(s) Oral daily  senna 1 Tablet(s) Oral at bedtime  sodium chloride 0.9%. 1000 milliLiter(s) IV Continuous <Continuous>  traMADol 50 milliGRAM(s) Oral every 8 hours PRN                            9.9    5.07  )-----------( 61       ( 06 Sep 2019 07:03 )             30.2       Hemoglobin: 9.9 g/dL (09-06 @ 07:03)  Hemoglobin: 11.0 g/dL (09-05 @ 12:30)  Hemoglobin: 10.0 g/dL (09-05 @ 07:10)  Hemoglobin: 10.6 g/dL (09-04 @ 07:17)  Hemoglobin: 11.0 g/dL (09-03 @ 06:47)      09-06    140  |  107  |  20<H>  ----------------------------<  63<L>  3.4<L>   |  28  |  0.67    Ca    8.6      06 Sep 2019 07:03  Phos  2.5     09-06  Mg     2.1     09-06    TPro  5.8<L>  /  Alb  1.8<L>  /  TBili  0.7  /  DBili  x   /  AST  48<H>  /  ALT  49  /  AlkPhos  88  09-06    Creatinine Trend: 0.67<--, 0.82<--, 1.04<--, 1.21<--, 1.00<--    COAGS:           T(C): 37.1 (09-07-19 @ 04:50), Max: 37.1 (09-07-19 @ 04:50)  HR: 74 (09-07-19 @ 04:50) (67 - 91)  BP: 97/54 (09-07-19 @ 04:50) (97/52 - 117/59)  RR: 18 (09-07-19 @ 04:50) (17 - 18)  SpO2: 95% (09-07-19 @ 04:50) (95% - 98%)  Wt(kg): --    I&O's Summary    06 Sep 2019 07:01  -  07 Sep 2019 07:00  --------------------------------------------------------  IN: 680 mL / OUT: 0 mL / NET: 680 mL        Gen: Appears well in NAD  HEENT:  (-)icterus (-)pallor  CV: N S1 S2 1/6 ERYN (+)2 Pulses B/l  Resp:  Clear to ausculatation B/L, normal effort  GI: (+) BS Soft, NT, ND  Lymph:  (-)Edema, (-)obvious lymphadenopathy  Skin: Warm to touch, Normal turgor  Psych: Appropriate mood and affect      TELEMETRY: 	  OFF    ECHO : < from: Transthoracic Echocardiogram (09.03.19 @ 07:20) >  CONCLUSIONS:  1. Normal mitral valve. Mild mitral regurgitation.  2. Normal trileaflet aortic valve.  3. Aortic Root: 3.2 cm.    4. Normal left atrium.  LA volume index = 23 cc/m2.  5. Normal left ventricular internal dimensions and wall  thicknesses.  6. Normal Left Ventricular Systolic Function,  (EF = 55 to  60%)  7. Grade II diastolic dysfunction.  8. Normal right atrium.  9. Normal right ventricular size and systolic function  (TAPSE  2.5cm).  10. RV systolic pressure is normal at  29 mm Hg.  11. There is mild tricuspid regurgitation.  12. Normal pulmonic valve.  13. Normal pericardium with no pericardial effusion.    < end of copied text >      ASSESSMENT/PLAN: 	54y  Female PMH of Asthma, presented with SOB, cough and right sided chest pain since yesterday morning. She started feeling short of breath, present at rest, not relieved with Albuterol inhaler, unable to ambulate due to SOB since yesterday. It was associated with wheeze and cough with whitish sputum found with right lower lobe consolidation and klebsiella bacteremia.    - Echo with normal LV fx   -  Abx per ID   -  GI / DVT prophylaxis,  keep K>4, mag >2.0   - Stress test with no ischemia  - No need for further inpatient cardiac work up.  - Pulmonary f/u  D/W Dr Roberts

## 2019-09-08 RX ORDER — IRON SUCROSE 20 MG/ML
100 INJECTION, SOLUTION INTRAVENOUS ONCE
Refills: 0 | Status: COMPLETED | OUTPATIENT
Start: 2019-09-08 | End: 2019-09-08

## 2019-09-08 RX ORDER — GUAIFENESIN/DEXTROMETHORPHAN 600MG-30MG
10 TABLET, EXTENDED RELEASE 12 HR ORAL
Refills: 0 | Status: COMPLETED | OUTPATIENT
Start: 2019-09-08 | End: 2019-09-10

## 2019-09-08 RX ORDER — METHADONE HYDROCHLORIDE 40 MG/1
55 TABLET ORAL DAILY
Refills: 0 | Status: DISCONTINUED | OUTPATIENT
Start: 2019-09-08 | End: 2019-09-15

## 2019-09-08 RX ADMIN — Medication 100 MILLIGRAM(S): at 11:17

## 2019-09-08 RX ADMIN — Medication 100 MILLIGRAM(S): at 04:23

## 2019-09-08 RX ADMIN — Medication 1 PATCH: at 11:15

## 2019-09-08 RX ADMIN — PIPERACILLIN AND TAZOBACTAM 25 GRAM(S): 4; .5 INJECTION, POWDER, LYOPHILIZED, FOR SOLUTION INTRAVENOUS at 22:17

## 2019-09-08 RX ADMIN — Medication 1 PATCH: at 19:00

## 2019-09-08 RX ADMIN — SENNA PLUS 1 TABLET(S): 8.6 TABLET ORAL at 22:17

## 2019-09-08 RX ADMIN — Medication 1 MILLIGRAM(S): at 11:15

## 2019-09-08 RX ADMIN — Medication 3 MILLILITER(S): at 14:23

## 2019-09-08 RX ADMIN — PIPERACILLIN AND TAZOBACTAM 25 GRAM(S): 4; .5 INJECTION, POWDER, LYOPHILIZED, FOR SOLUTION INTRAVENOUS at 06:11

## 2019-09-08 RX ADMIN — Medication 650 MILLIGRAM(S): at 22:16

## 2019-09-08 RX ADMIN — TRAMADOL HYDROCHLORIDE 50 MILLIGRAM(S): 50 TABLET ORAL at 15:45

## 2019-09-08 RX ADMIN — Medication 3 MILLILITER(S): at 20:42

## 2019-09-08 RX ADMIN — Medication 650 MILLIGRAM(S): at 12:00

## 2019-09-08 RX ADMIN — TRAMADOL HYDROCHLORIDE 50 MILLIGRAM(S): 50 TABLET ORAL at 23:30

## 2019-09-08 RX ADMIN — Medication 10 MILLILITER(S): at 23:01

## 2019-09-08 RX ADMIN — Medication 650 MILLIGRAM(S): at 11:19

## 2019-09-08 RX ADMIN — METHADONE HYDROCHLORIDE 55 MILLIGRAM(S): 40 TABLET ORAL at 11:14

## 2019-09-08 RX ADMIN — Medication 1 PATCH: at 12:00

## 2019-09-08 RX ADMIN — IRON SUCROSE 210 MILLIGRAM(S): 20 INJECTION, SOLUTION INTRAVENOUS at 14:57

## 2019-09-08 RX ADMIN — Medication 1 PATCH: at 07:44

## 2019-09-08 RX ADMIN — PIPERACILLIN AND TAZOBACTAM 25 GRAM(S): 4; .5 INJECTION, POWDER, LYOPHILIZED, FOR SOLUTION INTRAVENOUS at 15:39

## 2019-09-08 RX ADMIN — Medication 100 MILLIGRAM(S): at 11:15

## 2019-09-08 RX ADMIN — Medication 1000 UNIT(S): at 11:15

## 2019-09-08 RX ADMIN — TRAMADOL HYDROCHLORIDE 50 MILLIGRAM(S): 50 TABLET ORAL at 23:01

## 2019-09-08 RX ADMIN — Medication 10 MILLILITER(S): at 17:30

## 2019-09-08 RX ADMIN — Medication 650 MILLIGRAM(S): at 22:46

## 2019-09-08 RX ADMIN — LORATADINE 10 MILLIGRAM(S): 10 TABLET ORAL at 11:15

## 2019-09-08 RX ADMIN — TRAMADOL HYDROCHLORIDE 50 MILLIGRAM(S): 50 TABLET ORAL at 15:01

## 2019-09-08 RX ADMIN — TRAMADOL HYDROCHLORIDE 50 MILLIGRAM(S): 50 TABLET ORAL at 04:21

## 2019-09-08 RX ADMIN — Medication 3 MILLILITER(S): at 08:55

## 2019-09-08 RX ADMIN — Medication 20 MILLIGRAM(S): at 06:10

## 2019-09-08 NOTE — PROGRESS NOTE ADULT - PROBLEM SELECTOR PLAN 1
-P/w Cough, SOB, Chest pain since yesterday with 40 pack year history of smoking   -CXR: Opacification/consolidation of the right lower lung field.   -CT chest: Soft tissue impaction of the right middle and lower lobe   bronchi. Extensive airspace consolidations of the right middle and lower   lobes. Overall findings may reflect severe multifocal pneumonia, however   close follow-up to in short resolution and exclude endobronchial mass.   Mild mediastinal adenopathy.  - Will c/w zosyn (Day 7 today)  -Duo neb   -Blood culture from 9/3 shows Kliebsella pneumonia    -ID Dr Hopkins following  CT chesT: concern for malignancy. Dr Guo consulted. Patient may need Bronchoscopy vs IR guided Biopsy depending on Pulmonary recommendation  -repeat blood cultures NGTD  -urine cx negative  -HIV negative  -Pt wants inpatient followup for her lung mass

## 2019-09-08 NOTE — PROGRESS NOTE ADULT - SUBJECTIVE AND OBJECTIVE BOX
PGY 1 Note discussed with supervising resident and primary attending    Patient is a 54y old  Female who presents with a chief complaint of SOB, Right sided CP, cough since yesterday morning (03 Sep 2019 12:56)      INTERVAL HPI/OVERNIGHT EVENTS: Patient seen and examined at the bedside. Pt states that she wants pulmonary follow up for her mass to be done inpatient. Also states she needs more of the cough medicine as it is not helping.     MEDICATIONS  (STANDING):  ALBUTerol/ipratropium for Nebulization 3 milliLiter(s) Nebulizer every 6 hours  folic acid 1 milliGRAM(s) Oral daily  heparin  Injectable 5000 Unit(s) SubCutaneous every 8 hours  influenza   Vaccine 0.5 milliLiter(s) IntraMuscular once  loratadine 10 milliGRAM(s) Oral daily  methadone    Tablet 55 milliGRAM(s) Oral daily  methylPREDNISolone sodium succinate Injectable 40 milliGRAM(s) IV Push every 6 hours  piperacillin/tazobactam IVPB. 3.375 Gram(s) IV Intermittent once  piperacillin/tazobactam IVPB.. 3.375 Gram(s) IV Intermittent every 8 hours  sodium chloride 0.9%. 1000 milliLiter(s) (100 mL/Hr) IV Continuous <Continuous>    MEDICATIONS  (PRN):  acetaminophen   Tablet .. 650 milliGRAM(s) Oral every 6 hours PRN Temp greater or equal to 38C (100.4F), Mild Pain (1 - 3)  ALBUTerol/ipratropium for Nebulization 3 milliLiter(s) Nebulizer every 8 hours PRN Bronchospasm  guaiFENesin    Syrup 100 milliGRAM(s) Oral every 6 hours PRN Cough  ketorolac   Injectable 15 milliGRAM(s) IV Push every 8 hours PRN Moderate Pain (4 - 6)      __________________________________________________  REVIEW OF SYSTEMS:    CONSTITUTIONAL: No fever,   EYES: no acute visual disturbances  NECK: No pain or stiffness  RESPIRATORY: +cough; +shortness of breath  CARDIOVASCULAR: +chest pain, no palpitations  GASTROINTESTINAL: No pain. No nausea or vomiting; No diarrhea   NEUROLOGICAL: No headache or numbness, no tremors  MUSCULOSKELETAL: No joint pain, no muscle pain  GENITOURINARY: no dysuria, no frequency, no hesitancy  PSYCHIATRY: no depression , no anxiety  ALL OTHER  ROS negative        Vital Signs Last 24 Hrs  T(C): 37.1 (03 Sep 2019 11:09), Max: 37.1 (02 Sep 2019 15:00)  T(F): 98.7 (03 Sep 2019 11:09), Max: 98.8 (02 Sep 2019 15:00)  HR: 85 (03 Sep 2019 11:09) (81 - 110)  BP: 100/54 (03 Sep 2019 11:09) (89/62 - 103/68)  BP(mean): 75 (02 Sep 2019 15:00) (75 - 75)  RR: 18 (03 Sep 2019 11:09) (18 - 22)  SpO2: 98% (03 Sep 2019 11:09) (94% - 99%)    ________________________________________________  PHYSICAL EXAM:  GENERAL: NAD  HEENT: Normocephalic;  conjunctivae and sclerae clear; moist mucous membranes;   NECK : supple  CHEST/LUNG: Clear to auscultation bilaterally, decreased breath sounds on right base    HEART: S1 S2  regular; no murmurs, gallops or rubs  ABDOMEN: Soft, Nontender, Nondistended; Bowel sounds present  EXTREMITIES: no cyanosis; no edema; no calf tenderness  SKIN: warm and dry; no rash  NERVOUS SYSTEM:  Awake and alert; Oriented  to place, person and time ; no new deficits    _________________________________________________  LABS:                        11.0   2.98  )-----------( 106      ( 03 Sep 2019 06:47 )             33.1     09-03    134<L>  |  100  |  33<H>  ----------------------------<  109<H>  3.4<L>   |  26  |  1.21    Ca    8.2<L>      03 Sep 2019 06:47  Phos  3.0     09-03  Mg     1.8     09-03    TPro  6.1  /  Alb  2.1<L>  /  TBili  0.4  /  DBili  x   /  AST  52<H>  /  ALT  26  /  AlkPhos  42  09-03        CAPILLARY BLOOD GLUCOSE            RADIOLOGY & ADDITIONAL TESTS:    < from: CT Chest No Cont (09.02.19 @ 12:33) >  CHEST WALL AND LOWER NECK: Within normal limits  MEDIASTINUM AND GÓMEZ: Mild mediastinal and 2.2 cm subtracheal adenopathy.  HEART: Within normal limits  VESSELS: Within normal limits  LUNG AND LARGE AIRWAYS: Complete opacification of the right middle and   right lower lobe bronchi. Dense confluent airspace consolidation of the   right middle and right lower lobes. Patchy and tree-in-bud airspace   opacities right upper lobe. Paraseptal emphysema. Trace right pleural   effusion.  VISUALIZED UPPER ABDOMEN: Within normal limits.  BONES: Multiple old left-sided rib fractures.    IMPRESSION: Soft tissue impaction of the right middle and lower lobe   bronchi. Extensive airspace consolidations ofthe right middle and lower   lobes. Overall findings may reflect severe multifocal pneumonia, however   close follow-up to in short resolution and exclude endobronchial mass.   Mild mediastinal adenopathy.    < from: Transthoracic Echocardiogram (09.03.19 @ 07:20) >  CONCLUSIONS:  1. Normal mitral valve. Mild mitral regurgitation.  2. Normal trileaflet aortic valve.  3. Aortic Root: 3.2 cm.    4. Normal left atrium.  LA volume index = 23 cc/m2.  5. Normal left ventricular internal dimensions and wall  thicknesses.  6. Normal Left Ventricular Systolic Function,  (EF = 55 to  60%)  7. Grade II diastolic dysfunction.  8. Normal right atrium.  9. Normal right ventricular size and systolic function  (TAPSE  2.5cm).  10. RV systolic pressure is normal at  29 mm Hg.  11. There is mild tricuspid regurgitation.  12. Normal pulmonic valve.  13. Normal pericardium with no pericardial effusion.    < end of copied text >      < from: Nuclear Stress Test-Pharmacologic (09.05.19 @ 08:36) >  IMPRESSIONS:Normal Study  * Negative ECG evidence of ischemia after IV  administartion of Regadenoson.  * Myocardial Perfusion SPECT results are normal.  * No clear evidence of ischemia or infarct.  * Post-stress resting myocardial perfusion gated SPECT  imaging was performed (LVEF = 55 %) with no regional wall  motion abnormalities.    < end of copied text >        < end of copied text >    Imaging Personally Reviewed:  YES/NO    Consultant(s) Notes Reviewed:   YES/ No    Care Discussed with Consultants :     Plan of care was discussed with patient and /or primary care giver; all questions and concerns were addressed and care was aligned with patient's wishes.

## 2019-09-08 NOTE — PROGRESS NOTE ADULT - SUBJECTIVE AND OBJECTIVE BOX
Patient is a 54y old  Female who presents with a chief complaint of SOB, Right sided CP, cough since yesterday morning (08 Sep 2019 07:12)    PATIENT IS SEEN AND EXAMINED IN MEDICAL FLOOR.    ALLERGIES:  No Known Allergies      Daily Weight in k.9 (08 Sep 2019 04:50)    VITALS:    Vital Signs Last 24 Hrs  T(C): 36.9 (08 Sep 2019 10:57), Max: 36.9 (07 Sep 2019 15:18)  T(F): 98.5 (08 Sep 2019 10:57), Max: 98.5 (08 Sep 2019 04:50)  HR: 79 (08 Sep 2019 10:57) (69 - 84)  BP: 102/52 (08 Sep 2019 10:57) (94/54 - 102/52)  BP(mean): --  RR: 17 (08 Sep 2019 10:57) (17 - 19)  SpO2: 99% (08 Sep 2019 10:57) (96% - 99%)    LABS:    CBC Full  -  ( 07 Sep 2019 08:03 )  WBC Count : 6.66 K/uL  RBC Count : 3.09 M/uL  Hemoglobin : 9.5 g/dL  Hematocrit : 29.4 %  Platelet Count - Automated : 78 K/uL  Mean Cell Volume : 95.1 fl  Mean Cell Hemoglobin : 30.7 pg  Mean Cell Hemoglobin Concentration : 32.3 gm/dL  Auto Neutrophil # : x  Auto Lymphocyte # : x  Auto Monocyte # : x  Auto Eosinophil # : x  Auto Basophil # : x  Auto Neutrophil % : x  Auto Lymphocyte % : x  Auto Monocyte % : x  Auto Eosinophil % : x  Auto Basophil % : x          142  |  109<H>  |  13  ----------------------------<  76  4.0   |  29  |  0.57    Ca    8.6      07 Sep 2019 08:03  Phos  2.9     09-07  Mg     1.6     09-07    TPro  6.0  /  Alb  1.7<L>  /  TBili  1.0  /  DBili  x   /  AST  61<H>  /  ALT  82<H>  /  AlkPhos  114  09-07      LIVER FUNCTIONS - ( 07 Sep 2019 08:03 )  Alb: 1.7 g/dL / Pro: 6.0 g/dL / ALK PHOS: 114 U/L / ALT: 82 U/L DA / AST: 61 U/L / GGT: x           Creatinine Trend: 0.57<--, 0.67<--, 0.82<--, 1.04<--, 1.21<--, 1.00<--  I&O's Summary    07 Sep 2019 07:01  -  08 Sep 2019 07:00  --------------------------------------------------------  IN: 730 mL / OUT: 0 mL / NET: 730 mL    08 Sep 2019 07:  -  08 Sep 2019 12:43  --------------------------------------------------------  IN: 200 mL / OUT: 0 mL / NET: 200 mL      .Blood   @ 11:46   No growth to date.  --  --      .Urine   @ 01:22   No growth  --  --      .Blood   @ 01:11   Growth in aerobic and anaerobic bottles: Klebsiella pneumoniae  "Due to technical problems, Proteus sp. will Not be reported as part of  the BCID panel until further notice"  ***Blood Panel PCR results on this specimen are available  approximately 3 hours after the Gram stain result.***  Gram stain, PCR, and/or culture results may not always  correspond due to difference in methodologies.  ************************************************************  This PCR assay was performed using [employer].  The following targets are tested for: Enterococcus,  vancomycin resistant enterococci, Listeria monocytogenes,  coagulase negative staphylococci, S. aureus,  methicillin resistant S. aureus, Streptococcus agalactiae  (Group B), S. pneumoniae, S. pyogenes (Group A),  Acinetobacter baumannii, Enterobacter cloacae, E. coli,  Klebsiella oxytoca, K. pneumoniae, Proteus sp.,  Serratia marcescens, Haemophilus influenzae,  Neisseria meningitidis, Pseudomonas aeruginosa, Candida  albicans, C. glabrata, C krusei, C parapsilosis,  C. tropicalis and the KPC resistance gene.  --  Blood Culture PCR  Klebsiella pneumoniae          MEDICATIONS:    MEDICATIONS  (STANDING):  ALBUTerol/ipratropium for Nebulization 3 milliLiter(s) Nebulizer every 6 hours  cholecalciferol 1000 Unit(s) Oral daily  docusate sodium 100 milliGRAM(s) Oral daily  folic acid 1 milliGRAM(s) Oral daily  heparin  Injectable 5000 Unit(s) SubCutaneous every 8 hours  ibuprofen  Tablet. 400 milliGRAM(s) Oral three times a day  influenza   Vaccine 0.5 milliLiter(s) IntraMuscular once  loratadine 10 milliGRAM(s) Oral daily  methadone    Tablet 55 milliGRAM(s) Oral daily  nicotine - 21 mG/24Hr(s) Patch 1 patch Transdermal daily  piperacillin/tazobactam IVPB. 3.375 Gram(s) IV Intermittent once  piperacillin/tazobactam IVPB.. 3.375 Gram(s) IV Intermittent every 8 hours  senna 1 Tablet(s) Oral at bedtime  sodium chloride 0.9%. 1000 milliLiter(s) (100 mL/Hr) IV Continuous <Continuous>      MEDICATIONS  (PRN):  acetaminophen   Tablet .. 650 milliGRAM(s) Oral every 6 hours PRN Temp greater or equal to 38C (100.4F), Mild Pain (1 - 3)  ALBUTerol/ipratropium for Nebulization 3 milliLiter(s) Nebulizer every 8 hours PRN Bronchospasm  aluminum hydroxide/magnesium hydroxide/simethicone Suspension 30 milliLiter(s) Oral every 6 hours PRN Dyspepsia  guaiFENesin    Syrup 100 milliGRAM(s) Oral every 6 hours PRN Cough  traMADol 50 milliGRAM(s) Oral every 8 hours PRN Severe Pain (7 - 10)      REVIEW OF SYSTEMS:                           ALL ROS DONE [ X   ]    CONSTITUTIONAL:  LETHARGIC [   ], FEVER [   ], UNRESPONSIVE [   ]  CVS:  CP  [   ], SOB, [   ], PALPITATIONS [   ], DIZZYNESS [   ]  RS: COUGH [   ], SPUTUM [   ]  GI: ABDOMINAL PAIN [   ], NAUSEA [   ], VOMITINGS [   ], DIARRHEA [   ], CONSTIPATION [   ]  :  DYSURIA [   ], NOCTURIA [   ], INCREASED FREQUENCY [   ], DRIBLING [   ],  SKELETAL: PAINFUL JOINTS [   ], SWOLLEN JOINTS [   ], NECK ACHE [   ], LOW BACK ACHE [   ],  SKIN : ULCERS [   ], RASH [   ], ITCHING [   ]  CNS: HEAD ACHE [   ], DOUBLE VISION [   ], BLURRED VISION [   ], AMS / CONFUSION [   ], SEIZURES [   ], WEAKNESS [   ],TINGLING / NUMBNESS [   ]       PHYSICAL EXAMINATION:  GENERAL APPEARANCE: NO DISTRESS  HEENT:  NO PALLOR, NO  JVD,  NO   NODES, NECK SUPPLE  CVS: S1 +, S2 +,   RS: AEEB,  OCCASIONAL  RALES +,   NO RONCHI  ABD: SOFT, NT, NO, BS +  EXT: NO PE  SKIN: WARM,   SKELETAL:  ROM ACCEPTABLE  CNS:  AAO X 3   , NO  DEFICITS    RADIOLOGY :  < from: CT Chest No Cont (19 @ 12:33) >  IMPRESSION: Soft tissue impaction of the right middle and lower lobe   bronchi. Extensive airspace consolidations ofthe right middle and lower   lobes. Overall findings may reflect severe multifocal pneumonia, however   close follow-up to in short resolution and exclude endobronchial mass.   Mild mediastinal adenopathy.    < end of copied text >      ASSESSMENT :     Lobar pneumonia  Asthma      PLAN:  HPI:  53 yo female with PMH of Asthma, presented with SOB, cough and right sided chest pain since yesterday morning. She started feeling short of breath, present at rest, not relieved with Albuterol inhaler, unable to ambulate due to SOB since yesterday. It was associated with wheeze and cough with whitish sputum. She also complains of subjective fever. Chest pain is present on right lower side, 7/10 in severity, on/off, non radiating, stabbing in nature, worse with sitting and moving and lying on that side, relieved with Motrin.     Off note, she quit Heroin abuse on  and since then has been on Methadone 55mg OD. She goes to the Methadone clinic every day, 6 days a week for her Methadone dose. Clinic #: [phone]-Ext: [phone]. She has taken her dose for today. Primary team to call Clinic to confirm dose. Clinic was closed today. (02 Sep 2019 13:00)    - MULTIFOCAL PNEUMONIA, SUSPECT ASPIRATION PNEUMONIA, BACTEREMIA  ON IV ZOSYN, STEROIDS ( USE  ORAL PREDNISONE 40 MG AND TAPER IT OFF )   - CHEST PAIN, PLEURITIC  ON TRAMADOL, TYLENOL  - PULMONARY F/UP IS IN PROGRESS. MAY NEED BRONCHOSCOPY  - SUBSTANCE ABUSE , SW TO COORDINATE WITH PATIENT FOR OUT PATIENT REHAB   - GI AND DVT PROPHYLAXIS  - DR. TOVAR Patient is a 54y old  Female who presents with a chief complaint of SOB, Right sided CP, cough since yesterday morning (08 Sep 2019 07:12)    PATIENT IS SEEN AND EXAMINED IN MEDICAL FLOOR.    ALLERGIES:  No Known Allergies      Daily Weight in k.9 (08 Sep 2019 04:50)    VITALS:    Vital Signs Last 24 Hrs  T(C): 36.9 (08 Sep 2019 10:57), Max: 36.9 (07 Sep 2019 15:18)  T(F): 98.5 (08 Sep 2019 10:57), Max: 98.5 (08 Sep 2019 04:50)  HR: 79 (08 Sep 2019 10:57) (69 - 84)  BP: 102/52 (08 Sep 2019 10:57) (94/54 - 102/52)  BP(mean): --  RR: 17 (08 Sep 2019 10:57) (17 - 19)  SpO2: 99% (08 Sep 2019 10:57) (96% - 99%)    LABS:    CBC Full  -  ( 07 Sep 2019 08:03 )  WBC Count : 6.66 K/uL  RBC Count : 3.09 M/uL  Hemoglobin : 9.5 g/dL  Hematocrit : 29.4 %  Platelet Count - Automated : 78 K/uL  Mean Cell Volume : 95.1 fl  Mean Cell Hemoglobin : 30.7 pg  Mean Cell Hemoglobin Concentration : 32.3 gm/dL  Auto Neutrophil # : x  Auto Lymphocyte # : x  Auto Monocyte # : x  Auto Eosinophil # : x  Auto Basophil # : x  Auto Neutrophil % : x  Auto Lymphocyte % : x  Auto Monocyte % : x  Auto Eosinophil % : x  Auto Basophil % : x          142  |  109<H>  |  13  ----------------------------<  76  4.0   |  29  |  0.57    Ca    8.6      07 Sep 2019 08:03  Phos  2.9     09-07  Mg     1.6     09-07    TPro  6.0  /  Alb  1.7<L>  /  TBili  1.0  /  DBili  x   /  AST  61<H>  /  ALT  82<H>  /  AlkPhos  114  09-07      LIVER FUNCTIONS - ( 07 Sep 2019 08:03 )  Alb: 1.7 g/dL / Pro: 6.0 g/dL / ALK PHOS: 114 U/L / ALT: 82 U/L DA / AST: 61 U/L / GGT: x           Creatinine Trend: 0.57<--, 0.67<--, 0.82<--, 1.04<--, 1.21<--, 1.00<--  I&O's Summary    07 Sep 2019 07:01  -  08 Sep 2019 07:00  --------------------------------------------------------  IN: 730 mL / OUT: 0 mL / NET: 730 mL    08 Sep 2019 07:  -  08 Sep 2019 12:43  --------------------------------------------------------  IN: 200 mL / OUT: 0 mL / NET: 200 mL      .Blood   @ 11:46   No growth to date.  --  --      .Urine   @ 01:22   No growth  --  --      .Blood   @ 01:11   Growth in aerobic and anaerobic bottles: Klebsiella pneumoniae  "Due to technical problems, Proteus sp. will Not be reported as part of  the BCID panel until further notice"  ***Blood Panel PCR results on this specimen are available  approximately 3 hours after the Gram stain result.***  Gram stain, PCR, and/or culture results may not always  correspond due to difference in methodologies.  ************************************************************  This PCR assay was performed using [employer].  The following targets are tested for: Enterococcus,  vancomycin resistant enterococci, Listeria monocytogenes,  coagulase negative staphylococci, S. aureus,  methicillin resistant S. aureus, Streptococcus agalactiae  (Group B), S. pneumoniae, S. pyogenes (Group A),  Acinetobacter baumannii, Enterobacter cloacae, E. coli,  Klebsiella oxytoca, K. pneumoniae, Proteus sp.,  Serratia marcescens, Haemophilus influenzae,  Neisseria meningitidis, Pseudomonas aeruginosa, Candida  albicans, C. glabrata, C krusei, C parapsilosis,  C. tropicalis and the KPC resistance gene.  --  Blood Culture PCR  Klebsiella pneumoniae          MEDICATIONS:    MEDICATIONS  (STANDING):  ALBUTerol/ipratropium for Nebulization 3 milliLiter(s) Nebulizer every 6 hours  cholecalciferol 1000 Unit(s) Oral daily  docusate sodium 100 milliGRAM(s) Oral daily  folic acid 1 milliGRAM(s) Oral daily  heparin  Injectable 5000 Unit(s) SubCutaneous every 8 hours  ibuprofen  Tablet. 400 milliGRAM(s) Oral three times a day  influenza   Vaccine 0.5 milliLiter(s) IntraMuscular once  loratadine 10 milliGRAM(s) Oral daily  methadone    Tablet 55 milliGRAM(s) Oral daily  nicotine - 21 mG/24Hr(s) Patch 1 patch Transdermal daily  piperacillin/tazobactam IVPB. 3.375 Gram(s) IV Intermittent once  piperacillin/tazobactam IVPB.. 3.375 Gram(s) IV Intermittent every 8 hours  senna 1 Tablet(s) Oral at bedtime  sodium chloride 0.9%. 1000 milliLiter(s) (100 mL/Hr) IV Continuous <Continuous>      MEDICATIONS  (PRN):  acetaminophen   Tablet .. 650 milliGRAM(s) Oral every 6 hours PRN Temp greater or equal to 38C (100.4F), Mild Pain (1 - 3)  ALBUTerol/ipratropium for Nebulization 3 milliLiter(s) Nebulizer every 8 hours PRN Bronchospasm  aluminum hydroxide/magnesium hydroxide/simethicone Suspension 30 milliLiter(s) Oral every 6 hours PRN Dyspepsia  guaiFENesin    Syrup 100 milliGRAM(s) Oral every 6 hours PRN Cough  traMADol 50 milliGRAM(s) Oral every 8 hours PRN Severe Pain (7 - 10)      REVIEW OF SYSTEMS:                           ALL ROS DONE [ X   ]    CONSTITUTIONAL:  LETHARGIC [   ], FEVER [   ], UNRESPONSIVE [   ]  CVS:  CP  [   ], SOB, [   ], PALPITATIONS [   ], DIZZYNESS [   ]  RS: COUGH [   ], SPUTUM [   ]  GI: ABDOMINAL PAIN [   ], NAUSEA [   ], VOMITINGS [   ], DIARRHEA [   ], CONSTIPATION [   ]  :  DYSURIA [   ], NOCTURIA [   ], INCREASED FREQUENCY [   ], DRIBLING [   ],  SKELETAL: PAINFUL JOINTS [   ], SWOLLEN JOINTS [   ], NECK ACHE [   ], LOW BACK ACHE [   ],  SKIN : ULCERS [   ], RASH [   ], ITCHING [   ]  CNS: HEAD ACHE [   ], DOUBLE VISION [   ], BLURRED VISION [   ], AMS / CONFUSION [   ], SEIZURES [   ], WEAKNESS [   ],TINGLING / NUMBNESS [   ]       PHYSICAL EXAMINATION:  GENERAL APPEARANCE: NO DISTRESS  HEENT:  NO PALLOR, NO  JVD,  NO   NODES, NECK SUPPLE  CVS: S1 +, S2 +,   RS: AEEB,  OCCASIONAL  RALES +,   NO RONCHI  ABD: SOFT, NT, NO, BS +  EXT: NO PE  SKIN: WARM,   SKELETAL:  ROM ACCEPTABLE  CNS:  AAO X 3   , NO  DEFICITS    RADIOLOGY :  < from: CT Chest No Cont (19 @ 12:33) >  IMPRESSION: Soft tissue impaction of the right middle and lower lobe   bronchi. Extensive airspace consolidations ofthe right middle and lower   lobes. Overall findings may reflect severe multifocal pneumonia, however   close follow-up to in short resolution and exclude endobronchial mass.   Mild mediastinal adenopathy.    < end of copied text >      ASSESSMENT :     Lobar pneumonia  Asthma      PLAN:  HPI:  53 yo female with PMH of Asthma, presented with SOB, cough and right sided chest pain since yesterday morning. She started feeling short of breath, present at rest, not relieved with Albuterol inhaler, unable to ambulate due to SOB since yesterday. It was associated with wheeze and cough with whitish sputum. She also complains of subjective fever. Chest pain is present on right lower side, 7/10 in severity, on/off, non radiating, stabbing in nature, worse with sitting and moving and lying on that side, relieved with Motrin.     Off note, she quit Heroin abuse on  and since then has been on Methadone 55mg OD. She goes to the Methadone clinic every day, 6 days a week for her Methadone dose. Clinic #: [phone]-Ext: [phone]. She has taken her dose for today. Primary team to call Clinic to confirm dose. Clinic was closed today. (02 Sep 2019 13:00)    -  DC PLAN IN AM HOME OR TRANSFER TO IN PATIENT REHAB  - MULTIFOCAL PNEUMONIA, SUSPECT ASPIRATION PNEUMONIA, BACTEREMIA  ON IV ZOSYN, STEROIDS ( USE  ORAL PREDNISONE 40 MG AND TAPER IT OFF )   - CHEST PAIN, PLEURITIC  ON TRAMADOL, TYLENOL  - PULMONARY F/UP IS IN PROGRESS. MAY NEED BRONCHOSCOPY  - SUBSTANCE ABUSE , SW TO COORDINATE WITH PATIENT FOR OUT PATIENT REHAB   - GI AND DVT PROPHYLAXIS  - DR. TOVAR

## 2019-09-08 NOTE — PROGRESS NOTE ADULT - PROBLEM SELECTOR PLAN 2
p/w SOB with  wheezing   s/p :  Duo neb, Solu poli, Mg So4 in Ed  -CXR, CT chest; as above   -C/w prednisone 20mg daily for 5 days  -c/w  Riley Guo following  pt still has frequent cough p/w SOB with  wheezing   s/p :  Duo neb, Heidi poli, Mg So4 in Ed  -CXR, CT chest; as above   -prednisone completed  -c/w  Riley Guo following  pt still has frequent cough

## 2019-09-08 NOTE — PROGRESS NOTE ADULT - SUBJECTIVE AND OBJECTIVE BOX
pt denies any complaints , no chest pain or sob on exam.           acetaminophen   Tablet .. 650 milliGRAM(s) Oral every 6 hours PRN  ALBUTerol/ipratropium for Nebulization 3 milliLiter(s) Nebulizer every 8 hours PRN  ALBUTerol/ipratropium for Nebulization 3 milliLiter(s) Nebulizer every 6 hours  aluminum hydroxide/magnesium hydroxide/simethicone Suspension 30 milliLiter(s) Oral every 6 hours PRN  cholecalciferol 1000 Unit(s) Oral daily  docusate sodium 100 milliGRAM(s) Oral daily  folic acid 1 milliGRAM(s) Oral daily  guaiFENesin    Syrup 100 milliGRAM(s) Oral every 6 hours PRN  heparin  Injectable 5000 Unit(s) SubCutaneous every 8 hours  ibuprofen  Tablet. 400 milliGRAM(s) Oral three times a day  influenza   Vaccine 0.5 milliLiter(s) IntraMuscular once  loratadine 10 milliGRAM(s) Oral daily  methadone    Tablet 55 milliGRAM(s) Oral daily  nicotine - 21 mG/24Hr(s) Patch 1 patch Transdermal daily  piperacillin/tazobactam IVPB. 3.375 Gram(s) IV Intermittent once  piperacillin/tazobactam IVPB.. 3.375 Gram(s) IV Intermittent every 8 hours  senna 1 Tablet(s) Oral at bedtime  sodium chloride 0.9%. 1000 milliLiter(s) IV Continuous <Continuous>  traMADol 50 milliGRAM(s) Oral every 8 hours PRN                            9.5    6.66  )-----------( 78       ( 07 Sep 2019 08:03 )             29.4       Hemoglobin: 9.5 g/dL (09-07 @ 08:03)  Hemoglobin: 9.9 g/dL (09-06 @ 07:03)  Hemoglobin: 11.0 g/dL (09-05 @ 12:30)  Hemoglobin: 10.0 g/dL (09-05 @ 07:10)  Hemoglobin: 10.6 g/dL (09-04 @ 07:17)      09-07    142  |  109<H>  |  13  ----------------------------<  76  4.0   |  29  |  0.57    Ca    8.6      07 Sep 2019 08:03  Phos  2.9     09-07  Mg     1.6     09-07    TPro  6.0  /  Alb  1.7<L>  /  TBili  1.0  /  DBili  x   /  AST  61<H>  /  ALT  82<H>  /  AlkPhos  114  09-07    Creatinine Trend: 0.57<--, 0.67<--, 0.82<--, 1.04<--, 1.21<--, 1.00<--    COAGS:           T(C): 36.9 (09-08-19 @ 04:50), Max: 37.3 (09-07-19 @ 11:14)  HR: 74 (09-08-19 @ 04:50) (69 - 84)  BP: 98/60 (09-08-19 @ 04:50) (94/54 - 101/67)  RR: 19 (09-08-19 @ 04:50) (18 - 19)  SpO2: 96% (09-08-19 @ 04:50) (96% - 98%)  Wt(kg): --    I&O's Summary    07 Sep 2019 07:01  -  08 Sep 2019 07:00  --------------------------------------------------------  IN: 730 mL / OUT: 0 mL / NET: 730 mL        Gen: Appears well in NAD  HEENT:  (-)icterus (-)pallor  CV: N S1 S2 1/6 ERYN (+)2 Pulses B/l  Resp:  Clear to ausculatation B/L, normal effort  GI: (+) BS Soft, NT, ND  Lymph:  (-)Edema, (-)obvious lymphadenopathy  Skin: Warm to touch, Normal turgor  Psych: Appropriate mood and affect      TELEMETRY: 	  OFF    ECHO : < from: Transthoracic Echocardiogram (09.03.19 @ 07:20) >  CONCLUSIONS:  1. Normal mitral valve. Mild mitral regurgitation.  2. Normal trileaflet aortic valve.  3. Aortic Root: 3.2 cm.    4. Normal left atrium.  LA volume index = 23 cc/m2.  5. Normal left ventricular internal dimensions and wall  thicknesses.  6. Normal Left Ventricular Systolic Function,  (EF = 55 to  60%)  7. Grade II diastolic dysfunction.  8. Normal right atrium.  9. Normal right ventricular size and systolic function  (TAPSE  2.5cm).  10. RV systolic pressure is normal at  29 mm Hg.  11. There is mild tricuspid regurgitation.  12. Normal pulmonic valve.  13. Normal pericardium with no pericardial effusion.    < end of copied text >      ASSESSMENT/PLAN: 	54y  Female PMH of Asthma, presented with SOB, cough and right sided chest pain since yesterday morning. She started feeling short of breath, present at rest, not relieved with Albuterol inhaler, unable to ambulate due to SOB since yesterday. It was associated with wheeze and cough with whitish sputum found with right lower lobe consolidation and klebsiella bacteremia.    - Echo with normal LV fx ,  Stress test with no ischemia  - Abx per ID   - GI / DVT prophylaxis,  keep K>4, mag >2.0   - No need for further inpatient cardiac work up.  - Pulmonary f/u  D/W Dr Roberts

## 2019-09-08 NOTE — PROGRESS NOTE ADULT - PROBLEM SELECTOR PLAN 4
Hb dropped from 11 to 9.5 yesterday  Likely from iron deficiency   negative heparin antibodies  FOBT pending  one dose of iron sucrose given today, to be followed

## 2019-09-09 DIAGNOSIS — F43.10 POST-TRAUMATIC STRESS DISORDER, UNSPECIFIED: ICD-10-CM

## 2019-09-09 DIAGNOSIS — F17.200 NICOTINE DEPENDENCE, UNSPECIFIED, UNCOMPLICATED: ICD-10-CM

## 2019-09-09 DIAGNOSIS — F14.10 COCAINE ABUSE, UNCOMPLICATED: ICD-10-CM

## 2019-09-09 DIAGNOSIS — F11.20 OPIOID DEPENDENCE, UNCOMPLICATED: ICD-10-CM

## 2019-09-09 DIAGNOSIS — D64.9 ANEMIA, UNSPECIFIED: ICD-10-CM

## 2019-09-09 DIAGNOSIS — F40.298 OTHER SPECIFIED PHOBIA: ICD-10-CM

## 2019-09-09 DIAGNOSIS — F10.20 ALCOHOL DEPENDENCE, UNCOMPLICATED: ICD-10-CM

## 2019-09-09 DIAGNOSIS — E43 UNSPECIFIED SEVERE PROTEIN-CALORIE MALNUTRITION: ICD-10-CM

## 2019-09-09 DIAGNOSIS — Z02.9 ENCOUNTER FOR ADMINISTRATIVE EXAMINATIONS, UNSPECIFIED: ICD-10-CM

## 2019-09-09 PROCEDURE — 99222 1ST HOSP IP/OBS MODERATE 55: CPT

## 2019-09-09 RX ORDER — NICOTINE POLACRILEX 2 MG
1 GUM BUCCAL DAILY
Refills: 0 | Status: DISCONTINUED | OUTPATIENT
Start: 2019-09-09 | End: 2019-09-09

## 2019-09-09 RX ADMIN — Medication 100 MILLIGRAM(S): at 03:10

## 2019-09-09 RX ADMIN — Medication 100 MILLIGRAM(S): at 21:03

## 2019-09-09 RX ADMIN — Medication 1000 UNIT(S): at 11:07

## 2019-09-09 RX ADMIN — TRAMADOL HYDROCHLORIDE 50 MILLIGRAM(S): 50 TABLET ORAL at 08:03

## 2019-09-09 RX ADMIN — Medication 10 MILLILITER(S): at 11:07

## 2019-09-09 RX ADMIN — TRAMADOL HYDROCHLORIDE 50 MILLIGRAM(S): 50 TABLET ORAL at 17:31

## 2019-09-09 RX ADMIN — Medication 650 MILLIGRAM(S): at 21:03

## 2019-09-09 RX ADMIN — LORATADINE 10 MILLIGRAM(S): 10 TABLET ORAL at 11:07

## 2019-09-09 RX ADMIN — Medication 10 MILLILITER(S): at 05:09

## 2019-09-09 RX ADMIN — PIPERACILLIN AND TAZOBACTAM 25 GRAM(S): 4; .5 INJECTION, POWDER, LYOPHILIZED, FOR SOLUTION INTRAVENOUS at 14:04

## 2019-09-09 RX ADMIN — Medication 100 MILLIGRAM(S): at 11:07

## 2019-09-09 RX ADMIN — Medication 3 MILLILITER(S): at 08:03

## 2019-09-09 RX ADMIN — Medication 650 MILLIGRAM(S): at 05:10

## 2019-09-09 RX ADMIN — Medication 1 PATCH: at 11:07

## 2019-09-09 RX ADMIN — Medication 10 MILLILITER(S): at 17:30

## 2019-09-09 RX ADMIN — HEPARIN SODIUM 5000 UNIT(S): 5000 INJECTION INTRAVENOUS; SUBCUTANEOUS at 14:04

## 2019-09-09 RX ADMIN — PIPERACILLIN AND TAZOBACTAM 25 GRAM(S): 4; .5 INJECTION, POWDER, LYOPHILIZED, FOR SOLUTION INTRAVENOUS at 05:10

## 2019-09-09 RX ADMIN — Medication 650 MILLIGRAM(S): at 14:04

## 2019-09-09 RX ADMIN — SENNA PLUS 1 TABLET(S): 8.6 TABLET ORAL at 22:16

## 2019-09-09 RX ADMIN — Medication 650 MILLIGRAM(S): at 15:01

## 2019-09-09 RX ADMIN — PIPERACILLIN AND TAZOBACTAM 25 GRAM(S): 4; .5 INJECTION, POWDER, LYOPHILIZED, FOR SOLUTION INTRAVENOUS at 22:16

## 2019-09-09 RX ADMIN — Medication 3 MILLILITER(S): at 14:04

## 2019-09-09 RX ADMIN — HEPARIN SODIUM 5000 UNIT(S): 5000 INJECTION INTRAVENOUS; SUBCUTANEOUS at 22:16

## 2019-09-09 RX ADMIN — Medication 3 MILLILITER(S): at 20:56

## 2019-09-09 RX ADMIN — Medication 1 MILLIGRAM(S): at 11:07

## 2019-09-09 RX ADMIN — Medication 650 MILLIGRAM(S): at 22:03

## 2019-09-09 RX ADMIN — METHADONE HYDROCHLORIDE 55 MILLIGRAM(S): 40 TABLET ORAL at 11:08

## 2019-09-09 RX ADMIN — Medication 650 MILLIGRAM(S): at 05:40

## 2019-09-09 RX ADMIN — TRAMADOL HYDROCHLORIDE 50 MILLIGRAM(S): 50 TABLET ORAL at 08:59

## 2019-09-09 RX ADMIN — Medication 1 PATCH: at 19:27

## 2019-09-09 RX ADMIN — TRAMADOL HYDROCHLORIDE 50 MILLIGRAM(S): 50 TABLET ORAL at 18:16

## 2019-09-09 NOTE — DIETITIAN INITIAL EVALUATION ADULT. - PROBLEM SELECTOR PLAN 3
p/w Right side chest pain, worse with ambulation, and lying on right side.  Tenderness present on palpation. CXR; right sided PNa   -EKG: no acute changes  -T1:neg, follow T2, T3  -most likely due to costochondritis, Toradol for inflammatory pain   -But will monitor on Tele for any cardiac causes.   ECHo

## 2019-09-09 NOTE — PROGRESS NOTE ADULT - PROBLEM SELECTOR PLAN 5
Hb dropped from 11 to 9.5 yesterday  Likely from iron deficiency   negative heparin antibodies  FOBT pending  one dose of iron sucrose given today, to be followed. Hgb 9.5  Likely from iron deficiency   negative heparin antibodies  FOBT pending collection

## 2019-09-09 NOTE — BEHAVIORAL HEALTH ASSESSMENT NOTE - NSBHCONSULTMEDS_PSY_A_CORE FT
none; would benefit from SSRIs given residual PTSD sxs and anxiety  could try Neurontin for anxiety and to curb cocaine use won't start now given pending d/c within 24 hours

## 2019-09-09 NOTE — BEHAVIORAL HEALTH ASSESSMENT NOTE - DIFFERENTIAL
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

## 2019-09-09 NOTE — BEHAVIORAL HEALTH ASSESSMENT NOTE - NSBHCHARTREVIEWVS_PSY_A_CORE FT
ICU Vital Signs Last 24 Hrs  T(C): 36.8 (09 Sep 2019 08:09), Max: 37.3 (09 Sep 2019 00:21)  T(F): 98.2 (09 Sep 2019 08:09), Max: 99.2 (09 Sep 2019 00:21)  HR: 74 (09 Sep 2019 08:09) (70 - 82)  BP: 107/53 (09 Sep 2019 08:09) (101/50 - 107/53)  BP(mean): --  ABP: --  ABP(mean): --  RR: 16 (09 Sep 2019 08:09) (16 - 17)  SpO2: 96% (09 Sep 2019 08:09) (96% - 99%)
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
## 2019-09-09 NOTE — DIETITIAN INITIAL EVALUATION ADULT. - PROBLEM SELECTOR PLAN 1
-P/w Cough, SOB, Chest pain since yesterday with 40 pack year history of smoking   - CXR: Opacification/consolidation of the right lower lung field. This most   likely represents pneumonia. A superimposed right pleural effusion cannot   be excluded.  CT chest: Soft tissue impaction of the right middle and lower lobe   bronchi. Extensive airspace consolidations of the right middle and lower   lobes. Overall findings may reflect severe multifocal pneumonia, however   close follow-up to in short resolution and exclude endobronchial mass.   Mild mediastinal adenopathy.  s/p : Rocephin, Azithromycin in Ed   - Will c/w Rocephin, Azithro and Flagyl   -Duo neb   -follow Blood culture   CT chesT: concern for malignancy. Dr Guo consulted. Patient may need Bronchoscopy vs IR guided Biopsy depending on Pulmonary recommendation

## 2019-09-09 NOTE — PROGRESS NOTE ADULT - PROBLEM SELECTOR PLAN 4
resolved now  could be due to sepsis   Monitor CBC  f/u urine cultures, platelets are also low today (62k), Hb 10 with 5% metamyleocytes  HIV negative. resolved now  could be due to sepsis   Monitor CBC  platelets low
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
## 2019-09-09 NOTE — PROGRESS NOTE ADULT - SUBJECTIVE AND OBJECTIVE BOX
NP Note discussed with  Primary Attending    55 yo female with PMH of Asthma, presented with SOB, cough and right sided chest pain since yesterday morning. She started feeling short of breath, present at rest, not relieved with Albuterol inhaler.        INTERVAL HPI/OVERNIGHT EVENTS: Patient seen and examined at the bedside. Pt states that she wants pulmonary follow up for her mass to be done inpatient.     MEDICATIONS  (STANDING):  ALBUTerol/ipratropium for Nebulization 3 milliLiter(s) Nebulizer every 6 hours  cholecalciferol 1000 Unit(s) Oral daily  docusate sodium 100 milliGRAM(s) Oral daily  folic acid 1 milliGRAM(s) Oral daily  guaiFENesin/dextromethorphan  Syrup 10 milliLiter(s) Oral four times a day  heparin  Injectable 5000 Unit(s) SubCutaneous every 8 hours  influenza   Vaccine 0.5 milliLiter(s) IntraMuscular once  loratadine 10 milliGRAM(s) Oral daily  methadone    Tablet 55 milliGRAM(s) Oral daily  nicotine - 21 mG/24Hr(s) Patch 1 patch Transdermal daily  piperacillin/tazobactam IVPB. 3.375 Gram(s) IV Intermittent once  piperacillin/tazobactam IVPB.. 3.375 Gram(s) IV Intermittent every 8 hours  senna 1 Tablet(s) Oral at bedtime  sodium chloride 0.9%. 1000 milliLiter(s) (100 mL/Hr) IV Continuous <Continuous>    MEDICATIONS  (PRN):  acetaminophen   Tablet .. 650 milliGRAM(s) Oral every 6 hours PRN Temp greater or equal to 38C (100.4F), Mild Pain (1 - 3)  ALBUTerol/ipratropium for Nebulization 3 milliLiter(s) Nebulizer every 8 hours PRN Bronchospasm  aluminum hydroxide/magnesium hydroxide/simethicone Suspension 30 milliLiter(s) Oral every 6 hours PRN Dyspepsia  guaiFENesin    Syrup 100 milliGRAM(s) Oral every 6 hours PRN Cough  traMADol 50 milliGRAM(s) Oral every 8 hours PRN Severe Pain (7 - 10)      __________________________________________________  REVIEW OF SYSTEMS:    CONSTITUTIONAL: No fever, no chills  EYES: no acute visual disturbances  NECK: No pain or stiffness  RESPIRATORY: No cough; No shortness of breath  CARDIOVASCULAR: No chest pain, no palpitations  GASTROINTESTINAL: No pain. No nausea or vomiting; No diarrhea   NEUROLOGICAL: No headache or numbness, no tremors  MUSCULOSKELETAL: No joint pain, no muscle pain  GENITOURINARY: no dysuria, no frequency, no hesitancy  PSYCHIATRY: no depression, PTSD        Vital Signs Last 24 Hrs  T(C): 37.5 (09 Sep 2019 16:10), Max: 37.5 (09 Sep 2019 16:10)  T(F): 99.5 (09 Sep 2019 16:10), Max: 99.5 (09 Sep 2019 16:10)  HR: 102 (09 Sep 2019 16:10) (72 - 102)  BP: 100/49 (09 Sep 2019 16:10) (100/49 - 107/53)  BP(mean): --  RR: 18 (09 Sep 2019 16:10) (16 - 18)  SpO2: 98% (09 Sep 2019 16:10) (96% - 99%)    ________________________________________________  PHYSICAL EXAM:  GENERAL: NAD  HEENT: Normocephalic; conjunctivae and sclerae clear; moist mucous membranes  NECK: supple, no jvd  CHEST/LUNG: Clear to auscultation bilaterally with good air entry   HEART: S1 S2  regular; no murmurs, gallops or rubs  ABDOMEN: Soft, Nontender, Nondistended; Bowel sounds present  EXTREMITIES: no cyanosis; no edema; no calf tenderness  SKIN: warm and dry; no rash  NERVOUS SYSTEM: Awake and alert; Oriented to place, person and time; no new deficits    _________________________________________________  LABS:              CAPILLARY BLOOD GLUCOSE            RADIOLOGY & ADDITIONAL TESTS:    Consultant(s) Notes Reviewed:   YES    Care Discussed with Consultants :  Cardiology, Psychiatry    Plan of care was discussed with patient and /or primary care giver; all questions and concerns were addressed and care was aligned with patient's wishes.

## 2019-09-09 NOTE — PROGRESS NOTE ADULT - PROBLEM SELECTOR PLAN 3
p/w Right side chest pain, worse with ambulation, and lying on right side.  Tenderness present on palpation. CXR; right sided PNa   -EKG: no acute changes  -trops negative  -Echo showed grade 2 DD  -Stress test negative  -most likely due to costochondritis  -off tele  -Pain management consulted for pain. p/w Right side chest pain, worse with ambulation, and lying on right side.  CXR; right sided PNA   EKG: no acute changes  Troponin negative  Echo showed grade 2 DD  Stress test negative  most likely due to costochondritis    -Pain management consulted for pain.

## 2019-09-09 NOTE — CHART NOTE - NSCHARTNOTEFT_GEN_A_CORE
Upon Nutritional Assessment by the Registered Dietitian your patient was determined to meet criteria / has evidence of the following diagnosis/diagnoses:          [ ]  Mild Protein Calorie Malnutrition        [ ]  Moderate Protein Calorie Malnutrition        [x ] Severe Protein Calorie Malnutrition        [ ] Unspecified Protein Calorie Malnutrition        [ ] Underweight / BMI <19        [ ] Morbid Obesity / BMI > 40      Findings as based on:  •  Comprehensive nutrition assessment and consultation  •  Calorie counts (nutrient intake analysis)  •  Food acceptance and intake status from observations by staff  •  Follow up  •  Patient education  •  Intervention secondary to interdisciplinary rounds  •   concerns      Treatment:    The following diet has been recommended:      PROVIDER Section:     By signing this assessment you are acknowledging and agree with the diagnosis/diagnoses assigned by the Registered Dietitian    Comments:  continue with Regular diet, declined oral supplements , spoke with RN and NP

## 2019-09-09 NOTE — PROGRESS NOTE ADULT - SUBJECTIVE AND OBJECTIVE BOX
Patient still has episodes of right sided CP, Breathing Better  Review of systems otherwise (-)    acetaminophen   Tablet .. 650 milliGRAM(s) Oral every 6 hours PRN  ALBUTerol/ipratropium for Nebulization 3 milliLiter(s) Nebulizer every 8 hours PRN  ALBUTerol/ipratropium for Nebulization 3 milliLiter(s) Nebulizer every 6 hours  aluminum hydroxide/magnesium hydroxide/simethicone Suspension 30 milliLiter(s) Oral every 6 hours PRN  cholecalciferol 1000 Unit(s) Oral daily  docusate sodium 100 milliGRAM(s) Oral daily  folic acid 1 milliGRAM(s) Oral daily  guaiFENesin    Syrup 100 milliGRAM(s) Oral every 6 hours PRN  guaiFENesin/dextromethorphan  Syrup 10 milliLiter(s) Oral four times a day  heparin  Injectable 5000 Unit(s) SubCutaneous every 8 hours  influenza   Vaccine 0.5 milliLiter(s) IntraMuscular once  loratadine 10 milliGRAM(s) Oral daily  methadone    Tablet 55 milliGRAM(s) Oral daily  nicotine - 21 mG/24Hr(s) Patch 1 patch Transdermal daily  piperacillin/tazobactam IVPB. 3.375 Gram(s) IV Intermittent once  piperacillin/tazobactam IVPB.. 3.375 Gram(s) IV Intermittent every 8 hours  senna 1 Tablet(s) Oral at bedtime  sodium chloride 0.9%. 1000 milliLiter(s) IV Continuous <Continuous>  traMADol 50 milliGRAM(s) Oral every 8 hours PRN          Hemoglobin: 9.5 g/dL (09-07 @ 08:03)  Hemoglobin: 9.9 g/dL (09-06 @ 07:03)  Hemoglobin: 11.0 g/dL (09-05 @ 12:30)  Hemoglobin: 10.0 g/dL (09-05 @ 07:10)            Creatinine Trend: 0.57<--, 0.67<--, 0.82<--, 1.04<--, 1.21<--, 1.00<--    COAGS:           T(C): 36.8 (09-09-19 @ 08:09), Max: 37.3 (09-09-19 @ 00:21)  HR: 74 (09-09-19 @ 08:09) (70 - 82)  BP: 107/53 (09-09-19 @ 08:09) (101/50 - 107/53)  RR: 16 (09-09-19 @ 08:09) (16 - 17)  SpO2: 96% (09-09-19 @ 08:09) (96% - 99%)  Wt(kg): --    I&O's Summary    08 Sep 2019 07:01  -  09 Sep 2019 07:00  --------------------------------------------------------  IN: 200 mL / OUT: 0 mL / NET: 200 mL      Gen: Appears well in NAD  HEENT:  (-)icterus (-)pallor  CV: N S1 S2 1/6 ERYN (+)2 Pulses B/l  Resp:  Clear to ausculatation B/L, normal effort  GI: (+) BS Soft, NT, ND  Lymph:  (-)Edema, (-)obvious lymphadenopathy  Skin: Warm to touch, Normal turgor  Psych: Appropriate mood and affect      TELEMETRY: 	  OFF    ECHO : < from: Transthoracic Echocardiogram (09.03.19 @ 07:20) >  CONCLUSIONS:  1. Normal mitral valve. Mild mitral regurgitation.  2. Normal trileaflet aortic valve.  3. Aortic Root: 3.2 cm.    4. Normal left atrium.  LA volume index = 23 cc/m2.  5. Normal left ventricular internal dimensions and wall  thicknesses.  6. Normal Left Ventricular Systolic Function,  (EF = 55 to  60%)  7. Grade II diastolic dysfunction.  8. Normal right atrium.  9. Normal right ventricular size and systolic function  (TAPSE  2.5cm).  10. RV systolic pressure is normal at  29 mm Hg.  11. There is mild tricuspid regurgitation.  12. Normal pulmonic valve.  13. Normal pericardium with no pericardial effusion.    < end of copied text >      ASSESSMENT/PLAN: 	54y  Female PMH of Asthma, presented with SOB, cough and right sided chest pain since yesterday morning. She started feeling short of breath, present at rest, not relieved with Albuterol inhaler, unable to ambulate due to SOB . It was associated with wheeze and cough with whitish sputum found with right lower lobe consolidation and klebsiella bacteremia.    - Echo with normal LV fx ,  Stress test with no ischemia  - Abx per ID   - GI / DVT prophylaxis,  keep K>4, mag >2.0   - No need for further inpatient cardiac work up.  - Pulmonary f/u    Rashid Roberts MD, [facility]  BEEPER [phone]

## 2019-09-09 NOTE — DIETITIAN INITIAL EVALUATION ADULT. - PERTINENT LABORATORY DATA
09-07 Na142 mmol/L Glu 76 mg/dL K+ 4.0 mmol/L Cr  0.57 mg/dL BUN 13 mg/dL 09-07 Phos 2.9 mg/dL 09-07 Alb 1.7 g/dL<L> 09-03 [id number] 4.8 % 09-03 Chol 118 mg/dL LDL 49 mg/dL HDL 16 mg/dL<L> Trig 263 mg/dL<H>

## 2019-09-09 NOTE — BEHAVIORAL HEALTH ASSESSMENT NOTE - HPI (INCLUDE ILLNESS QUALITY, SEVERITY, DURATION, TIMING, CONTEXT, MODIFYING FACTORS, ASSOCIATED SIGNS AND SYMPTOMS)
53 yo female with PMH of Asthma, presented with SOB, cough and right sided chest pain since yesterday morning. She started feeling short of breath, present at rest, not relieved with Albuterol inhaler, unable to ambulate due to SOB since yesterday. It was associated with wheeze and cough with whitish sputum. She also complains of subjective fever. Chest pain is present on right lower side, 7/10 in severity, on/off, non radiating, stabbing in nature, worse with sitting and moving and lying on that side, relieved with Motrin.     Off note, she quit Heroin abuse on August 3rd and since then has been on Methadone 55mg OD. She goes to the Methadone clinic every day, 6 days a week for her Methadone dose. Clinic #: [phone]-Ext: [phone]. 53 yo female with PMH of Asthma, a/w PNA consulted for psych clearance.   Pt quit Heroin abuse on August 3rd and since then has been on Methadone 55mg OD. She goes to the Methadone clinic every day, 6 days a week for her Methadone dose. Clinic #: [phone]-Ext: [phone]. 55 yo female with PMH of Asthma, a/w PNA consulted for psych clearance.     Pt reports she is here for medical complaints however very interested in drug rehab after discharge.  She got hooked up on Oxycontin after 9/11 related herniated disk and chronic pain; when pt management got her off of it she was "scrambling for a doctor" and ended up getting introduced to heroin by a friend that she snorted for a couple months; stopped on August 3rd and since then has been on Methadone 55mg OD. She goes to the Methadone clinic every day, 6 days a week for her Methadone dose. Clinic #: [phone]-Ext: 259. It has been going well and she has not used any opiates for the past few weeks.   She also uses cocaine occasionally "whenever it's available" by friends since "I don't have that kind of money". Smokes 1PPD and drinks anywhere between 6-12 beers a day. No liquors, denied complications; sxs of blackouts, withdrawals, seizures, medical treatments.   In terms of psych history she reports a h/o PTSD and depression after 9/11 that she was treated for as an outpatient however never ended up taking Zoloft regularly. She was on Valium for years up to 2017. no benzos recently.   her psych sxs "settled a lot" since then and at this point she feels her issues are primarily related to drug use that makes her "agitated". "I want to get into a program and start my life over"   Mood overall has been "back off-alyce..I keep it to myself". Has been functioning at her baseline. Endorses some anxiety lately, mostly about her life situation and not having anywhere to live. no panic attacks or insomnia. does endorse feeling triggered at crowded places that she avoids due to "feeling trapped" no clear flashbacks. Denies nightmares.  Denies lifetime h/o manic, psychotic sxs, SI/HI/violence.

## 2019-09-09 NOTE — PROGRESS NOTE ADULT - PROBLEM SELECTOR PLAN 6
pt is an active smoker  c/w nicotine patch (increased from 14 to 21mg/24h). pt is an active smoker  Smoking cessation reinforced  c/w nicotine patch (increased from 14 to 21mg/24h).

## 2019-09-09 NOTE — PROGRESS NOTE ADULT - PROBLEM SELECTOR PLAN 1
P/w Cough, SOB, Chest pain since yesterday with 40 pack year history of smoking   -CXR: Opacification/consolidation of the right lower lung field.   -CT chest: Soft tissue impaction of the right middle and lower lobe   bronchi. Extensive airspace consolidations of the right middle and lower   lobes. Overall findings may reflect severe multifocal pneumonia, however   close follow-up to in short resolution and exclude endobronchial mass.   Mild mediastinal adenopathy.  - Will c/w zosyn (Day 7 today)  -Duo neb   -Blood culture from 9/3 shows Kliebsella pneumonia    -ID Dr Hopkins following  CT chesT: concern for malignancy. Dr Guo consulted. Patient may need Bronchoscopy vs IR guided Biopsy depending on Pulmonary recommendation  -repeat blood cultures NGTD  -urine cx negative  -HIV negative  -Pt wants inpatient followup for her lung mass. CXR: Opacification/consolidation of the right lower lung field.   CT chest: Soft tissue impaction of the right middle and lower lobe   bronchi. Extensive airspace consolidations of the right middle and lower   lobes. Overall findings may reflect severe multifocal pneumonia, however   close follow-up to in short resolution and exclude endobronchial mass.   Mild mediastinal adenopathy.  Will c/w zosyn (Day 8 today)  Duoneb   Blood culture from 9/3 shows Klebsella pneumonia    ID Dr Hopkins following  CT chest: concern for malignancy. Dr Guo consulted. Patient may need Bronchoscopy vs IR guided biopsy   Repeat blood cultures NGTD  Urine cx negative  HIV negative  Pt wants inpatient follow up for her lung mass. CXR: Opacification/consolidation of the right lower lung field.   CT chest: Soft tissue impaction of the right middle and lower lobe   bronchi. Extensive airspace consolidations of the right middle and lower     lobes. Overall findings may reflect severe multifocal pneumonia, however   close follow-up to in short resolution and exclude endobronchial mass.   Mild mediastinal adenopathy.  Will c/w zosyn (Day 8 today)  Duoneb   Blood culture from 9/3 shows Klebsella pneumonia    ID Dr Hopkins following  CT chest: concern for malignancy. Dr Guo consulted. Patient may need Bronchoscopy vs IR guided biopsy   Repeat blood cultures NGTD  Urine cx negative  HIV negative  Pt wants inpatient follow up for her lung mass.

## 2019-09-09 NOTE — DIETITIAN INITIAL EVALUATION ADULT. - FACTORS AFF FOOD INTAKE
however, per RN , patient with No swallowing issues/persistent lack of appetite/difficulty swallowing

## 2019-09-09 NOTE — DIETITIAN INITIAL EVALUATION ADULT. - OTHER INFO
Patient reports poor po intake for 3 months PTA, resulting in weight loss 18% from usual in 3 months ( 74.5 to 61kg)

## 2019-09-09 NOTE — DIETITIAN INITIAL EVALUATION ADULT. - PROBLEM SELECTOR PLAN 5
She quit Heroin abuse on August 3rd and since then has been on Methadone 55mg OD. She goes to the Methadone clinic every day, 6 days a week for her Methadone dose. Clinic #: [phone]-Ext: [phone]. She has taken her dose for today. Primary team to call Clinic to confirm dose. Clinic was closed today.

## 2019-09-10 LAB
ANION GAP SERPL CALC-SCNC: 3 MMOL/L — LOW (ref 5–17)
BUN SERPL-MCNC: 8 MG/DL — SIGNIFICANT CHANGE UP (ref 7–18)
CALCIUM SERPL-MCNC: 8.3 MG/DL — LOW (ref 8.4–10.5)
CHLORIDE SERPL-SCNC: 101 MMOL/L — SIGNIFICANT CHANGE UP (ref 96–108)
CO2 SERPL-SCNC: 31 MMOL/L — SIGNIFICANT CHANGE UP (ref 22–31)
CREAT SERPL-MCNC: 0.61 MG/DL — SIGNIFICANT CHANGE UP (ref 0.5–1.3)
CULTURE RESULTS: SIGNIFICANT CHANGE UP
CULTURE RESULTS: SIGNIFICANT CHANGE UP
GLUCOSE SERPL-MCNC: 58 MG/DL — LOW (ref 70–99)
HCT VFR BLD CALC: 29.1 % — LOW (ref 34.5–45)
HGB BLD-MCNC: 9.2 G/DL — LOW (ref 11.5–15.5)
MAGNESIUM SERPL-MCNC: 1.9 MG/DL — SIGNIFICANT CHANGE UP (ref 1.6–2.6)
MCHC RBC-ENTMCNC: 30.7 PG — SIGNIFICANT CHANGE UP (ref 27–34)
MCHC RBC-ENTMCNC: 31.6 GM/DL — LOW (ref 32–36)
MCV RBC AUTO: 97 FL — SIGNIFICANT CHANGE UP (ref 80–100)
NRBC # BLD: 0 /100 WBCS — SIGNIFICANT CHANGE UP (ref 0–0)
PHOSPHATE SERPL-MCNC: 3.3 MG/DL — SIGNIFICANT CHANGE UP (ref 2.5–4.5)
PLATELET # BLD AUTO: 252 K/UL — SIGNIFICANT CHANGE UP (ref 150–400)
POTASSIUM SERPL-MCNC: 3.9 MMOL/L — SIGNIFICANT CHANGE UP (ref 3.5–5.3)
POTASSIUM SERPL-SCNC: 3.9 MMOL/L — SIGNIFICANT CHANGE UP (ref 3.5–5.3)
RBC # BLD: 3 M/UL — LOW (ref 3.8–5.2)
RBC # FLD: 14.7 % — HIGH (ref 10.3–14.5)
SODIUM SERPL-SCNC: 135 MMOL/L — SIGNIFICANT CHANGE UP (ref 135–145)
SPECIMEN SOURCE: SIGNIFICANT CHANGE UP
SPECIMEN SOURCE: SIGNIFICANT CHANGE UP
WBC # BLD: 10.16 K/UL — SIGNIFICANT CHANGE UP (ref 3.8–10.5)
WBC # FLD AUTO: 10.16 K/UL — SIGNIFICANT CHANGE UP (ref 3.8–10.5)

## 2019-09-10 PROCEDURE — 71250 CT THORAX DX C-: CPT | Mod: 26

## 2019-09-10 RX ORDER — IBUPROFEN 200 MG
600 TABLET ORAL ONCE
Refills: 0 | Status: COMPLETED | OUTPATIENT
Start: 2019-09-10 | End: 2019-09-10

## 2019-09-10 RX ADMIN — LORATADINE 10 MILLIGRAM(S): 10 TABLET ORAL at 11:48

## 2019-09-10 RX ADMIN — TRAMADOL HYDROCHLORIDE 50 MILLIGRAM(S): 50 TABLET ORAL at 10:19

## 2019-09-10 RX ADMIN — Medication 650 MILLIGRAM(S): at 08:33

## 2019-09-10 RX ADMIN — TRAMADOL HYDROCHLORIDE 50 MILLIGRAM(S): 50 TABLET ORAL at 22:15

## 2019-09-10 RX ADMIN — TRAMADOL HYDROCHLORIDE 50 MILLIGRAM(S): 50 TABLET ORAL at 02:29

## 2019-09-10 RX ADMIN — Medication 10 MILLILITER(S): at 01:32

## 2019-09-10 RX ADMIN — PIPERACILLIN AND TAZOBACTAM 25 GRAM(S): 4; .5 INJECTION, POWDER, LYOPHILIZED, FOR SOLUTION INTRAVENOUS at 06:24

## 2019-09-10 RX ADMIN — PIPERACILLIN AND TAZOBACTAM 25 GRAM(S): 4; .5 INJECTION, POWDER, LYOPHILIZED, FOR SOLUTION INTRAVENOUS at 15:24

## 2019-09-10 RX ADMIN — Medication 1 MILLIGRAM(S): at 11:48

## 2019-09-10 RX ADMIN — SENNA PLUS 1 TABLET(S): 8.6 TABLET ORAL at 21:39

## 2019-09-10 RX ADMIN — Medication 3 MILLILITER(S): at 08:16

## 2019-09-10 RX ADMIN — Medication 1 PATCH: at 07:40

## 2019-09-10 RX ADMIN — TRAMADOL HYDROCHLORIDE 50 MILLIGRAM(S): 50 TABLET ORAL at 01:32

## 2019-09-10 RX ADMIN — Medication 600 MILLIGRAM(S): at 17:24

## 2019-09-10 RX ADMIN — Medication 600 MILLIGRAM(S): at 17:04

## 2019-09-10 RX ADMIN — Medication 3 MILLILITER(S): at 14:49

## 2019-09-10 RX ADMIN — Medication 1 PATCH: at 11:48

## 2019-09-10 RX ADMIN — Medication 3 MILLILITER(S): at 21:17

## 2019-09-10 RX ADMIN — PIPERACILLIN AND TAZOBACTAM 25 GRAM(S): 4; .5 INJECTION, POWDER, LYOPHILIZED, FOR SOLUTION INTRAVENOUS at 21:39

## 2019-09-10 RX ADMIN — Medication 10 MILLILITER(S): at 11:49

## 2019-09-10 RX ADMIN — Medication 100 MILLIGRAM(S): at 21:39

## 2019-09-10 RX ADMIN — Medication 650 MILLIGRAM(S): at 06:22

## 2019-09-10 RX ADMIN — Medication 10 MILLILITER(S): at 06:24

## 2019-09-10 RX ADMIN — HEPARIN SODIUM 5000 UNIT(S): 5000 INJECTION INTRAVENOUS; SUBCUTANEOUS at 15:24

## 2019-09-10 RX ADMIN — METHADONE HYDROCHLORIDE 55 MILLIGRAM(S): 40 TABLET ORAL at 11:47

## 2019-09-10 RX ADMIN — Medication 100 MILLIGRAM(S): at 11:48

## 2019-09-10 RX ADMIN — Medication 1000 UNIT(S): at 11:48

## 2019-09-10 RX ADMIN — HEPARIN SODIUM 5000 UNIT(S): 5000 INJECTION INTRAVENOUS; SUBCUTANEOUS at 21:39

## 2019-09-10 RX ADMIN — Medication 1 PATCH: at 12:14

## 2019-09-10 RX ADMIN — Medication 100 MILLIGRAM(S): at 10:19

## 2019-09-10 RX ADMIN — TRAMADOL HYDROCHLORIDE 50 MILLIGRAM(S): 50 TABLET ORAL at 21:38

## 2019-09-10 RX ADMIN — TRAMADOL HYDROCHLORIDE 50 MILLIGRAM(S): 50 TABLET ORAL at 10:49

## 2019-09-10 NOTE — PROGRESS NOTE ADULT - SUBJECTIVE AND OBJECTIVE BOX
NP Note discussed with Primary Attending    55 yo female with PMH of Asthma, presented with SOB, cough and right sided chest pain since yesterday morning. She started feeling short of breath, present at rest, not relieved with Albuterol inhaler.      INTERVAL HPI/OVERNIGHT EVENTS: Patient seen and examined at the bedside. Pt states that she wants pulmonary follow up for her mass to be done inpatient.       MEDICATIONS  (STANDING):  ALBUTerol/ipratropium for Nebulization 3 milliLiter(s) Nebulizer every 6 hours  cholecalciferol 1000 Unit(s) Oral daily  docusate sodium 100 milliGRAM(s) Oral daily  folic acid 1 milliGRAM(s) Oral daily  heparin  Injectable 5000 Unit(s) SubCutaneous every 8 hours  influenza   Vaccine 0.5 milliLiter(s) IntraMuscular once  loratadine 10 milliGRAM(s) Oral daily  methadone    Tablet 55 milliGRAM(s) Oral daily  nicotine - 21 mG/24Hr(s) Patch 1 patch Transdermal daily  piperacillin/tazobactam IVPB. 3.375 Gram(s) IV Intermittent once  piperacillin/tazobactam IVPB.. 3.375 Gram(s) IV Intermittent every 8 hours  senna 1 Tablet(s) Oral at bedtime  sodium chloride 0.9%. 1000 milliLiter(s) (100 mL/Hr) IV Continuous <Continuous>    MEDICATIONS  (PRN):  acetaminophen   Tablet .. 650 milliGRAM(s) Oral every 6 hours PRN Temp greater or equal to 38C (100.4F), Mild Pain (1 - 3)  ALBUTerol/ipratropium for Nebulization 3 milliLiter(s) Nebulizer every 8 hours PRN Bronchospasm  aluminum hydroxide/magnesium hydroxide/simethicone Suspension 30 milliLiter(s) Oral every 6 hours PRN Dyspepsia  guaiFENesin    Syrup 100 milliGRAM(s) Oral every 6 hours PRN Cough  traMADol 50 milliGRAM(s) Oral every 8 hours PRN Severe Pain (7 - 10)      __________________________________________________  REVIEW OF SYSTEMS:  CONSTITUTIONAL: No fever, no chills  EYES: no acute visual disturbances  NECK: No pain or stiffness  RESPIRATORY: No cough; No shortness of breath  CARDIOVASCULAR: No chest pain, no palpitations  GASTROINTESTINAL: No pain. No nausea or vomiting; No diarrhea   NEUROLOGICAL: No headache or numbness, no tremors  MUSCULOSKELETAL: No joint pain, no muscle pain  GENITOURINARY: no dysuria, no frequency, no hesitancy  PSYCHIATRY: depression, PTSD        Vital Signs Last 24 Hrs  T(C): 37.6 (10 Sep 2019 16:19), Max: 37.6 (10 Sep 2019 16:19)  T(F): 99.7 (10 Sep 2019 16:19), Max: 99.7 (10 Sep 2019 16:19)  HR: 100 (10 Sep 2019 16:19) (73 - 100)  BP: 96/59 (10 Sep 2019 16:19) (96/59 - 108/56)  BP(mean): --  RR: 18 (10 Sep 2019 16:19) (18 - 18)  SpO2: 96% (10 Sep 2019 16:19) (90% - 99%)    ________________________________________________  PHYSICAL EXAM:  GENERAL: NAD  HEENT: Normocephalic; conjunctivae and sclerae clear; moist mucous membranes  NECK: supple, no jvd  CHEST/LUNG: Clear to auscultation bilaterally with good air entry   HEART: S1 S2  regular; no murmurs, gallops or rubs  ABDOMEN: Soft, Nontender, Nondistended; Bowel sounds present  EXTREMITIES: no cyanosis; no edema; no calf tenderness  SKIN: warm and dry; no rash  NERVOUS SYSTEM: Awake and alert; oriented to place, person and time; no new deficits    _________________________________________________  LABS:                        9.2    10.16 )-----------( 252      ( 10 Sep 2019 06:53 )             29.1     09-10    135  |  101  |  8   ----------------------------<  58<L>  3.9   |  31  |  0.61    Ca    8.3<L>      10 Sep 2019 06:53  Phos  3.3     09-10  Mg     1.9     09-10          CAPILLARY BLOOD GLUCOSE            RADIOLOGY & ADDITIONAL TESTS:    EXAM:  CT CHEST                            PROCEDURE DATE:  09/10/2019        INTERPRETATION:  CLINICAL INDICATION: 54 years  Female with TO EVALUATE   RIGHT LUNG LESIONS  AS ABOVE. Pneumonia.    COMPARISON: 9/2/2019    PROCEDURE:   CT of the Chest was performed without intravenous contrast.  Sagittal and coronal reformats were performed.      FINDINGS:    LUNGS AND AIRWAYS: Persistent dense consolidation involving most of the   right middle lobe extending into the right lower lobe. Right lower lobe   air bronchograms present. Diffuse thickening of the bronchovascular   interstitium within the right lung. Right lower lobe dependent   atelectasis. Mild discoid atelectasis in the left lower lobe. Mild   bilateral upper lobe emphysema.    PLEURA: Trace right pleural effusion. No left pleural effusion.    MEDIASTINUM AND GÓMEZ: Right upper paratracheal lymph node measuring 7 mm   in short axis (3:37). Right upper paratracheal lymph node measuring 9 mm   in short axis (3:49). Right lower paratracheal lymph node measuring 1 cm   in short axis (3:58). Enlarged subcarinal lymph node measuring 2.2 cm in   short axis (3:71). Extension of right lung consolidation into the right   hilum. Limited evaluation for vascular invasion or constrictionsecondary   to lack of IV contrast. Complete opacification of the right middle lobe   bronchi.    VESSELS: No evidence of aortic aneurysm. Please see Steinmann hilar   findings.    HEART: Heart size is normal. Trace pericardial effusion.    CHEST WALL AND LOWER NECK: Within normal limits.    VISUALIZED UPPER ABDOMEN: Suspected splenomegaly. The spleen is     incompletely imaged. The adrenal glands are incompletely imaged.    BONES: Mild cervical and thoracic degenerative changes.    IMPRESSION:     Dense consolidation in the right middle and lower lobe with diffuse right   lung bronchovascular thickening as well as mediastinal adenopathy.   Complete opacification of the right middle lobe bronchi. Findings   concerning for underlying neoplasm.    Extension of right lung consolidation into the right hilum. Limited   evaluation for vascular invasion or constriction secondary to lack of IV   contrast.    Mild emphysema.        Consultant(s) Notes Reviewed:   YES    Care Discussed with Consultants : Pulmonary, Infectious disease    Plan of care was discussed with patient and /or primary care giver; all questions and concerns were addressed and care was aligned with patient's wishes.

## 2019-09-10 NOTE — PROGRESS NOTE ADULT - PROBLEM SELECTOR PLAN 3
p/w Right side chest pain, worse with ambulation, and lying on right side.  CXR; right sided PNA   EKG: no acute changes  Troponin negative  Echo showed grade 2 DD  Stress test negative  most likely due to costochondritis

## 2019-09-10 NOTE — PROGRESS NOTE ADULT - SUBJECTIVE AND OBJECTIVE BOX
pt seen and examined, no complaints, ROS - .   no events overnight          acetaminophen   Tablet .. 650 milliGRAM(s) Oral every 6 hours PRN  ALBUTerol/ipratropium for Nebulization 3 milliLiter(s) Nebulizer every 8 hours PRN  ALBUTerol/ipratropium for Nebulization 3 milliLiter(s) Nebulizer every 6 hours  aluminum hydroxide/magnesium hydroxide/simethicone Suspension 30 milliLiter(s) Oral every 6 hours PRN  cholecalciferol 1000 Unit(s) Oral daily  docusate sodium 100 milliGRAM(s) Oral daily  folic acid 1 milliGRAM(s) Oral daily  guaiFENesin    Syrup 100 milliGRAM(s) Oral every 6 hours PRN  guaiFENesin/dextromethorphan  Syrup 10 milliLiter(s) Oral four times a day  heparin  Injectable 5000 Unit(s) SubCutaneous every 8 hours  influenza   Vaccine 0.5 milliLiter(s) IntraMuscular once  loratadine 10 milliGRAM(s) Oral daily  methadone    Tablet 55 milliGRAM(s) Oral daily  nicotine - 21 mG/24Hr(s) Patch 1 patch Transdermal daily  piperacillin/tazobactam IVPB. 3.375 Gram(s) IV Intermittent once  piperacillin/tazobactam IVPB.. 3.375 Gram(s) IV Intermittent every 8 hours  senna 1 Tablet(s) Oral at bedtime  sodium chloride 0.9%. 1000 milliLiter(s) IV Continuous <Continuous>  traMADol 50 milliGRAM(s) Oral every 8 hours PRN          Hemoglobin: 9.5 g/dL (09-07 @ 08:03)  Hemoglobin: 9.9 g/dL (09-06 @ 07:03)  Hemoglobin: 11.0 g/dL (09-05 @ 12:30)  Hemoglobin: 10.0 g/dL (09-05 @ 07:10)            Creatinine Trend: 0.57<--, 0.67<--, 0.82<--, 1.04<--, 1.21<--, 1.00<--    COAGS:           T(C): 37.4 (09-09-19 @ 23:56), Max: 37.5 (09-09-19 @ 16:10)  HR: 86 (09-09-19 @ 23:56) (73 - 102)  BP: 108/56 (09-09-19 @ 23:56) (100/49 - 108/56)  RR: 18 (09-09-19 @ 23:56) (16 - 18)  SpO2: 90% (09-09-19 @ 23:56) (90% - 99%)  Wt(kg): --    I&O's Summary    08 Sep 2019 07:01  -  09 Sep 2019 07:00  --------------------------------------------------------  IN: 200 mL / OUT: 0 mL / NET: 200 mL        Gen: Appears well in NAD  HEENT:  (-)icterus (-)pallor  CV: N S1 S2 1/6 ERYN (+)2 Pulses B/l  Resp:  Clear to ausculatation B/L, normal effort  GI: (+) BS Soft, NT, ND  Lymph:  (-)Edema, (-)obvious lymphadenopathy  Skin: Warm to touch, Normal turgor  Psych: Appropriate mood and affect      TELEMETRY: 	  OFF    ECHO : < from: Transthoracic Echocardiogram (09.03.19 @ 07:20) >  CONCLUSIONS:  1. Normal mitral valve. Mild mitral regurgitation.  2. Normal trileaflet aortic valve.  3. Aortic Root: 3.2 cm.    4. Normal left atrium.  LA volume index = 23 cc/m2.  5. Normal left ventricular internal dimensions and wall  thicknesses.  6. Normal Left Ventricular Systolic Function,  (EF = 55 to  60%)  7. Grade II diastolic dysfunction.  8. Normal right atrium.  9. Normal right ventricular size and systolic function  (TAPSE  2.5cm).  10. RV systolic pressure is normal at  29 mm Hg.  11. There is mild tricuspid regurgitation.  12. Normal pulmonic valve.  13. Normal pericardium with no pericardial effusion.    < end of copied text >      ASSESSMENT/PLAN: 	54y  Female PMH of Asthma, presented with SOB, cough and right sided chest pain since yesterday morning. She started feeling short of breath, present at rest, not relieved with Albuterol inhaler, unable to ambulate due to SOB since yesterday. It was associated with wheeze and cough with whitish sputum found with right lower lobe consolidation and klebsiella bacteremia.    - Echo with normal LV fx ,  Stress test with no ischemia, cardiac markers neg   - ECG with no ischemic changes   - start low dose asa 81 mg ,  plt once thrombocytopenia resolves   - Abx per ID   - GI / DVT prophylaxis,  keep K>4, mag >2.0   - No need for further inpatient cardiac work up.  - Pulmonary f/u  D/W Dr Roberts

## 2019-09-10 NOTE — PROGRESS NOTE ADULT - SUBJECTIVE AND OBJECTIVE BOX
Time of Visit:  Patient seen and examined. c/c of right rib /chest pain    MEDICATIONS  (STANDING):  ALBUTerol/ipratropium for Nebulization 3 milliLiter(s) Nebulizer every 6 hours  cholecalciferol 1000 Unit(s) Oral daily  docusate sodium 100 milliGRAM(s) Oral daily  folic acid 1 milliGRAM(s) Oral daily  heparin  Injectable 5000 Unit(s) SubCutaneous every 8 hours  ibuprofen  Tablet. 600 milliGRAM(s) Oral once  influenza   Vaccine 0.5 milliLiter(s) IntraMuscular once  loratadine 10 milliGRAM(s) Oral daily  methadone    Tablet 55 milliGRAM(s) Oral daily  nicotine - 21 mG/24Hr(s) Patch 1 patch Transdermal daily  piperacillin/tazobactam IVPB. 3.375 Gram(s) IV Intermittent once  piperacillin/tazobactam IVPB.. 3.375 Gram(s) IV Intermittent every 8 hours  senna 1 Tablet(s) Oral at bedtime  sodium chloride 0.9%. 1000 milliLiter(s) (100 mL/Hr) IV Continuous <Continuous>      MEDICATIONS  (PRN):  acetaminophen   Tablet .. 650 milliGRAM(s) Oral every 6 hours PRN Temp greater or equal to 38C (100.4F), Mild Pain (1 - 3)  ALBUTerol/ipratropium for Nebulization 3 milliLiter(s) Nebulizer every 8 hours PRN Bronchospasm  aluminum hydroxide/magnesium hydroxide/simethicone Suspension 30 milliLiter(s) Oral every 6 hours PRN Dyspepsia  guaiFENesin    Syrup 100 milliGRAM(s) Oral every 6 hours PRN Cough  traMADol 50 milliGRAM(s) Oral every 8 hours PRN Severe Pain (7 - 10)       Medications up to date at time of exam.      PHYSICAL EXAMINATION:  Patient has no new complaints.  GENERAL: The patient is a well-developed, well-nourished, in no apparent distress.     Vital Signs Last 24 Hrs  T(C): 37.4 (09 Sep 2019 23:56), Max: 37.4 (09 Sep 2019 23:56)  T(F): 99.3 (09 Sep 2019 23:56), Max: 99.3 (09 Sep 2019 23:56)  HR: 90 (10 Sep 2019 09:29) (73 - 93)  BP: 108/56 (09 Sep 2019 23:56) (108/56 - 108/56)  BP(mean): --  RR: 18 (09 Sep 2019 23:56) (18 - 18)  SpO2: 97% (10 Sep 2019 09:29) (90% - 99%)   (if applicable)    Chest Tube (if applicable)    HEENT: Head is normocephalic and atraumatic. Extraocular muscles are intact. Mucous membranes are moist.     NECK: Supple, no palpable adenopathy.    LUNGS: Clear to auscultation, no wheezing, rales, or rhonchi.    HEART: Regular rate and rhythm without murmur.    ABDOMEN: Soft, nontender, and nondistended.  No hepatosplenomegaly is noted.    : No painful voiding, no pelvic pain    EXTREMITIES: Without any cyanosis, clubbing, rash, lesions or edema.    NEUROLOGIC: Awake, alert, oriented, grossly intact    SKIN: Warm, dry, good turgor.      LABS:                        9.2    10.16 )-----------( 252      ( 10 Sep 2019 06:53 )             29.1     09-10    135  |  101  |  8   ----------------------------<  58<L>  3.9   |  31  |  0.61    Ca    8.3<L>      10 Sep 2019 06:53  Phos  3.3     09-10  Mg     1.9     09-10                          MICROBIOLOGY: (if applicable)    RADIOLOGY & ADDITIONAL STUDIES:  EKG:   CT chest:< from: CT Chest No Cont (09.10.19 @ 09:26) >    EXAM:  CT CHEST                            PROCEDURE DATE:  09/10/2019          INTERPRETATION:  CLINICAL INDICATION: 54 years  Female with TO EVALUATE   RIGHT LUNG LESIONS  AS ABOVE. Pneumonia.    COMPARISON: 9/2/2019    PROCEDURE:   CT of the Chest was performed without intravenous contrast.  Sagittal and coronal reformats were performed.      FINDINGS:    LUNGS AND AIRWAYS: Persistent dense consolidation involving most of the   right middle lobe extending into the right lower lobe. Right lower lobe   air bronchograms present. Diffuse thickening of the bronchovascular   interstitium within the right lung. Right lower lobe dependent   atelectasis. Mild discoid atelectasis in the left lower lobe. Mild   bilateral upper lobe emphysema.    PLEURA: Trace right pleural effusion. No left pleural effusion.    MEDIASTINUM AND GÓMEZ: Right upper paratracheal lymph node measuring 7 mm   in short axis (3:37). Right upper paratracheal lymph node measuring 9 mm   in short axis (3:49). Right lower paratracheal lymph node measuring 1 cm   in short axis (3:58). Enlarged subcarinal lymph node measuring 2.2 cm in   short axis (3:71). Extension of right lung consolidation into the right   hilum. Limited evaluation for vascular invasion or constrictionsecondary   to lack of IV contrast. Complete opacification of the right middle lobe   bronchi.    VESSELS: No evidence of aortic aneurysm. Please see Steinmann hilar   findings.    HEART: Heart size is normal. Trace pericardial effusion.    CHEST WALL AND LOWER NECK: Within normal limits.    VISUALIZED UPPER ABDOMEN: Suspected splenomegaly. The spleen is   incompletely imaged. The adrenal glands are incompletely imaged.    BONES: Mild cervical and thoracic degenerative changes.    IMPRESSION:     Dense consolidation in the right middle and lower lobe with diffuse right   lung bronchovascular thickening as well as mediastinal adenopathy.   Complete opacification of the right middle lobe bronchi. Findings   concerning for underlying neoplasm.    Extension of right lung consolidation into the right hilum. Limited   evaluation for vascular invasion or constriction secondary to lack of IV   contrast.    Mild emphysema.                    SHIRA BELTRAN M.D., ATTENDING RADIOLOGIST  This document has been electronically signed. Sep 10 2019 10:01AM                < end of copied text >    ECHO:    IMPRESSION: 54y Female PAST MEDICAL & SURGICAL HISTORY:  Asthma  No significant past surgical history   p/w       IMP: This is 54 yr old woman active  smoker ( 40 pack years)  on methadone for heroine use admitted with pna.  CT chest show RML bronchus obstruction with mediastinal adenopathy may be due to pan vs malignancy. Pat has been recieving iv antibx for 8 days. Repeated CT chest shows no change in lung consolidation.    +klebsiella pneum bacteremia 9/3 repeat BC 9/5 neg  HIV negative    RECOMMENDATIONS:  - con't with antibiotics as per ID recommendations.   - for IR guided bx of lung mass bx for 9/12  -methadone  -dupnebs q6h  -dvt/gi prophy  -advise to stop smoking  -out pat pul f/u  -nicotine patch    c/d with yeturu

## 2019-09-10 NOTE — PROGRESS NOTE ADULT - ASSESSMENT
1.	Multifocal pneumonia   2.	Klebisella pneumoniae bacteremia - likely from UTI  3.	UTI  ·	cont zosyn 3.375gm IV q8h  D9, needs 5 days more of treatment  ·	awaiting review by IR for possible lung bx   ·	reconsult prn 1.	Multifocal pneumonia   2.	Klebisella pneumoniae bacteremia - likely from UTI  3.	UTI  ·	cont zosyn 3.375gm IV q8h  D9, needs 5 days more of treatment( if she is discharged prior to the 5 days of zosyn then substitute Ceftin 500mgs po bid for remaining days)   ·	awaiting review by IR for possible lung bx   ·	reconsult prn

## 2019-09-10 NOTE — PROGRESS NOTE ADULT - PROBLEM SELECTOR PLAN 1
CXR: Opacification/consolidation of the right lower lung field.   CT chest: Soft tissue impaction of the right middle and lower lobe   bronchi. Extensive airspace consolidations of the right middle and lower   lobes. Overall findings may reflect severe multifocal pneumonia, however   close follow-up to in short resolution and exclude endobronchial mass.   Mild mediastinal adenopathy.  Will c/w zosyn (Day 9 today), 5 days more left  Blood culture from 9/3 shows Klebsella pneumonia   ID Dr Hopkins following   CT chest: concern for malignancy.   Dr Guo consulted.   Patient is scheduled for IR guided biopsy of right lung 9/12/19 CXR: Opacification/consolidation of the right lower lung field.   CT chest: Soft tissue impaction of the right middle and lower lobe   bronchi. Extensive airspace consolidations of the right middle and lower   lobes. Overall findings may reflect severe multifocal pneumonia, however   close follow-up to in short resolution and exclude endobronchial mass.   Mild mediastinal adenopathy.  Will c/w zosyn (Day 9 today), 5 days more left  Blood culture from 9/3 shows Klebsella pneumonia   ID Dr Hopkins following   CT chest: concern for malignancy.   Dr Guo consulted.   Patient is scheduled for IR guided biopsy of right lung 9/12/19  Hem/Onc, Dr. Rosado consulted

## 2019-09-10 NOTE — PROGRESS NOTE ADULT - SUBJECTIVE AND OBJECTIVE BOX
54y Female is under our care for right sided multifocal pneumonia and bacteremia. Patient is still c/o productive cough and right sided chest pain. Repeat BC remain negative.  Underwent CT chest today and still shows multifocal pneumonia and suspicion of neoplasm. Awaiting IR to review Pt's case for possible lung bx.     REVIEW OF SYSTEMS:  [  ] Not able to illicit  General: no fevers no malaise  Chest: +productive cough no sob +CP  GI: no nvd  : no urinary sxs   Skin: no rashes  Neuro: no ha's no dizziness     MEDS:  piperacillin/tazobactam IVPB. 3.375 Gram(s) IV Intermittent once  piperacillin/tazobactam IVPB.. 3.375 Gram(s) IV Intermittent every 8 hours    ALLERGIES: Allergies    No Known Allergies    Intolerances      VITALS:  Vital Signs Last 24 Hrs  T(C): 37.4 (09 Sep 2019 23:56), Max: 37.5 (09 Sep 2019 16:10)  T(F): 99.3 (09 Sep 2019 23:56), Max: 99.5 (09 Sep 2019 16:10)  HR: 90 (10 Sep 2019 09:29) (73 - 102)  BP: 108/56 (09 Sep 2019 23:56) (100/49 - 108/56)  BP(mean): --  RR: 18 (09 Sep 2019 23:56) (18 - 18)  SpO2: 97% (10 Sep 2019 09:29) (90% - 99%)      PHYSICAL EXAM:  HEENT: n/a  Neck: supple no LN's   Respiratory: right mid to low lung base rales no rhonchi   Cardiovascular: S1 S2 reg no murmurs  Gastrointestinal: +BS with soft, nondistended abdomen; no tenderness  Extremities: no edema   Skin: no new rashes  Ortho: n/a  Neuro: AAO x 3      LABS/DIAGNOSTIC TESTS:                        9.2    10.16 )-----------( 252      ( 10 Sep 2019 06:53 )             29.1     09-10    135  |  101  |  8   ----------------------------<  58<L>  3.9   |  31  |  0.61    Ca    8.3<L>      10 Sep 2019 06:53  Phos  3.3     09-10  Mg     1.9     09-10        CULTURES:   .Blood  09-05 @ 11:46   No growth to date.  --  --      .Urine  09-04 @ 01:22   No growth  --  --      .Blood  09-03 @ 01:11   Growth in aerobic and anaerobic bottles: Klebsiella pneumoniae  "Due to technical problems, Proteus sp. will Not be reported as part of  the BCID panel until further notice"  ***Blood Panel PCR results on this specimen are available  approximately 3 hours after the Gram stain result.***  Gram stain, PCR, and/or culture results may not always  correspond due to difference in methodologies.  ************************************************************  This PCR assay was performed using [employer].  The following targets are tested for: Enterococcus,  vancomycin resistant enterococci, Listeria monocytogenes,  coagulase negative staphylococci, S. aureus,  methicillin resistant S. aureus, Streptococcus agalactiae  (Group B), S. pneumoniae, S. pyogenes (Group A),  Acinetobacter baumannii, Enterobacter cloacae, E. coli,  Klebsiella oxytoca, K. pneumoniae, Proteus sp.,  Serratia marcescens, Haemophilus influenzae,  Neisseria meningitidis, Pseudomonas aeruginosa, Candida  albicans, C. glabrata, C krusei, C parapsilosis,  C. tropicalis and the KPC resistance gene.  --  Blood Culture PCR  Klebsiella pneumoniae        RADIOLOGY:    EXAM:  CT CHEST                            PROCEDURE DATE:  09/10/2019          INTERPRETATION:  CLINICAL INDICATION: 54 years  Female with TO EVALUATE   RIGHT LUNG LESIONS  AS ABOVE. Pneumonia.    COMPARISON: 9/2/2019    PROCEDURE:   CT of the Chest was performed without intravenous contrast.  Sagittal and coronal reformats were performed.      FINDINGS:    LUNGS AND AIRWAYS: Persistent dense consolidation involving most of the   right middle lobe extending into the right lower lobe. Right lower lobe   air bronchograms present. Diffuse thickening of the bronchovascular   interstitium within the right lung. Right lower lobe dependent   atelectasis. Mild discoid atelectasis in the left lower lobe. Mild   bilateral upper lobe emphysema.    PLEURA: Trace right pleural effusion. No left pleural effusion.    MEDIASTINUM AND GÓMEZ: Right upper paratracheal lymph node measuring 7 mm   in short axis (3:37). Right upper paratracheal lymph node measuring 9 mm   in short axis (3:49). Right lower paratracheal lymph node measuring 1 cm   in short axis (3:58). Enlarged subcarinal lymph node measuring 2.2 cm in   short axis (3:71). Extension of right lung consolidation into the right   hilum. Limited evaluation for vascular invasion or constrictionsecondary   to lack of IV contrast. Complete opacification of the right middle lobe   bronchi.    VESSELS: No evidence of aortic aneurysm. Please see Steinmann hilar   findings.    HEART: Heart size is normal. Trace pericardial effusion.    CHEST WALL AND LOWER NECK: Within normal limits.    VISUALIZED UPPER ABDOMEN: Suspected splenomegaly. The spleen is   incompletely imaged. The adrenal glands are incompletely imaged.    BONES: Mild cervical and thoracic degenerative changes.    IMPRESSION:     Dense consolidation in the right middle and lower lobe with diffuse right   lung bronchovascular thickening as well as mediastinal adenopathy.   Complete opacification of the right middle lobe bronchi. Findings   concerning for underlying neoplasm.    Extension of right lung consolidation into the right hilum. Limited   evaluation for vascular invasion or constriction secondary to lack of IV   contrast.

## 2019-09-11 DIAGNOSIS — J45.909 UNSPECIFIED ASTHMA, UNCOMPLICATED: ICD-10-CM

## 2019-09-11 LAB
ANION GAP SERPL CALC-SCNC: 4 MMOL/L — LOW (ref 5–17)
BUN SERPL-MCNC: 8 MG/DL — SIGNIFICANT CHANGE UP (ref 7–18)
CALCIUM SERPL-MCNC: 8.1 MG/DL — LOW (ref 8.4–10.5)
CHLORIDE SERPL-SCNC: 104 MMOL/L — SIGNIFICANT CHANGE UP (ref 96–108)
CO2 SERPL-SCNC: 29 MMOL/L — SIGNIFICANT CHANGE UP (ref 22–31)
CREAT SERPL-MCNC: 0.58 MG/DL — SIGNIFICANT CHANGE UP (ref 0.5–1.3)
GLUCOSE BLDC GLUCOMTR-MCNC: 108 MG/DL — HIGH (ref 70–99)
GLUCOSE SERPL-MCNC: 75 MG/DL — SIGNIFICANT CHANGE UP (ref 70–99)
HCT VFR BLD CALC: 23.5 % — LOW (ref 34.5–45)
HGB BLD-MCNC: 7.5 G/DL — LOW (ref 11.5–15.5)
MCHC RBC-ENTMCNC: 31.4 PG — SIGNIFICANT CHANGE UP (ref 27–34)
MCHC RBC-ENTMCNC: 31.9 GM/DL — LOW (ref 32–36)
MCV RBC AUTO: 98.3 FL — SIGNIFICANT CHANGE UP (ref 80–100)
NRBC # BLD: 0 /100 WBCS — SIGNIFICANT CHANGE UP (ref 0–0)
PLATELET # BLD AUTO: 221 K/UL — SIGNIFICANT CHANGE UP (ref 150–400)
POTASSIUM SERPL-MCNC: 3.7 MMOL/L — SIGNIFICANT CHANGE UP (ref 3.5–5.3)
POTASSIUM SERPL-SCNC: 3.7 MMOL/L — SIGNIFICANT CHANGE UP (ref 3.5–5.3)
RBC # BLD: 2.39 M/UL — LOW (ref 3.8–5.2)
RBC # FLD: 14.6 % — HIGH (ref 10.3–14.5)
SODIUM SERPL-SCNC: 137 MMOL/L — SIGNIFICANT CHANGE UP (ref 135–145)
WBC # BLD: 6.01 K/UL — SIGNIFICANT CHANGE UP (ref 3.8–10.5)
WBC # FLD AUTO: 6.01 K/UL — SIGNIFICANT CHANGE UP (ref 3.8–10.5)

## 2019-09-11 RX ORDER — SODIUM CHLORIDE 9 MG/ML
1000 INJECTION INTRAMUSCULAR; INTRAVENOUS; SUBCUTANEOUS ONCE
Refills: 0 | Status: COMPLETED | OUTPATIENT
Start: 2019-09-11 | End: 2019-09-11

## 2019-09-11 RX ORDER — TRAMADOL HYDROCHLORIDE 50 MG/1
50 TABLET ORAL EVERY 8 HOURS
Refills: 0 | Status: DISCONTINUED | OUTPATIENT
Start: 2019-09-11 | End: 2019-09-18

## 2019-09-11 RX ADMIN — Medication 100 MILLIGRAM(S): at 11:18

## 2019-09-11 RX ADMIN — Medication 1 PATCH: at 08:03

## 2019-09-11 RX ADMIN — Medication 100 MILLIGRAM(S): at 18:42

## 2019-09-11 RX ADMIN — HEPARIN SODIUM 5000 UNIT(S): 5000 INJECTION INTRAVENOUS; SUBCUTANEOUS at 05:31

## 2019-09-11 RX ADMIN — Medication 1 PATCH: at 11:18

## 2019-09-11 RX ADMIN — TRAMADOL HYDROCHLORIDE 50 MILLIGRAM(S): 50 TABLET ORAL at 05:30

## 2019-09-11 RX ADMIN — PIPERACILLIN AND TAZOBACTAM 25 GRAM(S): 4; .5 INJECTION, POWDER, LYOPHILIZED, FOR SOLUTION INTRAVENOUS at 13:42

## 2019-09-11 RX ADMIN — Medication 3 MILLILITER(S): at 09:08

## 2019-09-11 RX ADMIN — Medication 1 PATCH: at 19:52

## 2019-09-11 RX ADMIN — Medication 1000 UNIT(S): at 11:18

## 2019-09-11 RX ADMIN — SENNA PLUS 1 TABLET(S): 8.6 TABLET ORAL at 21:40

## 2019-09-11 RX ADMIN — Medication 650 MILLIGRAM(S): at 02:46

## 2019-09-11 RX ADMIN — Medication 100 MILLIGRAM(S): at 02:46

## 2019-09-11 RX ADMIN — Medication 650 MILLIGRAM(S): at 03:30

## 2019-09-11 RX ADMIN — METHADONE HYDROCHLORIDE 55 MILLIGRAM(S): 40 TABLET ORAL at 11:17

## 2019-09-11 RX ADMIN — Medication 650 MILLIGRAM(S): at 18:42

## 2019-09-11 RX ADMIN — TRAMADOL HYDROCHLORIDE 50 MILLIGRAM(S): 50 TABLET ORAL at 22:40

## 2019-09-11 RX ADMIN — TRAMADOL HYDROCHLORIDE 50 MILLIGRAM(S): 50 TABLET ORAL at 13:40

## 2019-09-11 RX ADMIN — SODIUM CHLORIDE 1000 MILLILITER(S): 9 INJECTION INTRAMUSCULAR; INTRAVENOUS; SUBCUTANEOUS at 03:03

## 2019-09-11 RX ADMIN — Medication 3 MILLILITER(S): at 14:47

## 2019-09-11 RX ADMIN — LORATADINE 10 MILLIGRAM(S): 10 TABLET ORAL at 11:18

## 2019-09-11 RX ADMIN — PIPERACILLIN AND TAZOBACTAM 25 GRAM(S): 4; .5 INJECTION, POWDER, LYOPHILIZED, FOR SOLUTION INTRAVENOUS at 21:40

## 2019-09-11 RX ADMIN — TRAMADOL HYDROCHLORIDE 50 MILLIGRAM(S): 50 TABLET ORAL at 14:10

## 2019-09-11 RX ADMIN — Medication 1 PATCH: at 00:45

## 2019-09-11 RX ADMIN — PIPERACILLIN AND TAZOBACTAM 25 GRAM(S): 4; .5 INJECTION, POWDER, LYOPHILIZED, FOR SOLUTION INTRAVENOUS at 05:31

## 2019-09-11 RX ADMIN — TRAMADOL HYDROCHLORIDE 50 MILLIGRAM(S): 50 TABLET ORAL at 06:27

## 2019-09-11 RX ADMIN — HEPARIN SODIUM 5000 UNIT(S): 5000 INJECTION INTRAVENOUS; SUBCUTANEOUS at 13:42

## 2019-09-11 RX ADMIN — Medication 3 MILLILITER(S): at 20:29

## 2019-09-11 RX ADMIN — Medication 650 MILLIGRAM(S): at 19:47

## 2019-09-11 RX ADMIN — Medication 1 PATCH: at 11:05

## 2019-09-11 RX ADMIN — TRAMADOL HYDROCHLORIDE 50 MILLIGRAM(S): 50 TABLET ORAL at 21:40

## 2019-09-11 RX ADMIN — Medication 1 MILLIGRAM(S): at 11:18

## 2019-09-11 NOTE — PROGRESS NOTE ADULT - SUBJECTIVE AND OBJECTIVE BOX
NP Note discussed with Primary Attending    55 yo female with PMH of Asthma, presented with SOB, cough and right sided chest pain since yesterday morning. She started feeling short of breath, present at rest, not relieved with Albuterol inhaler. CT chest showed right lung mass with concern for underlying neoplasm. Patient is scheduled for IR guided biopsy of right lung mass 9/12/19.      INTERVAL HPI/OVERNIGHT EVENTS: Patient seen and examined at the bedside, she denies acute complaints      MEDICATIONS  (STANDING):  ALBUTerol/ipratropium for Nebulization 3 milliLiter(s) Nebulizer every 6 hours  cholecalciferol 1000 Unit(s) Oral daily  docusate sodium 100 milliGRAM(s) Oral daily  folic acid 1 milliGRAM(s) Oral daily  heparin  Injectable 5000 Unit(s) SubCutaneous every 8 hours  influenza   Vaccine 0.5 milliLiter(s) IntraMuscular once  loratadine 10 milliGRAM(s) Oral daily  methadone    Tablet 55 milliGRAM(s) Oral daily  nicotine - 21 mG/24Hr(s) Patch 1 patch Transdermal daily  piperacillin/tazobactam IVPB. 3.375 Gram(s) IV Intermittent once  piperacillin/tazobactam IVPB.. 3.375 Gram(s) IV Intermittent every 8 hours  senna 1 Tablet(s) Oral at bedtime  sodium chloride 0.9%. 1000 milliLiter(s) (100 mL/Hr) IV Continuous <Continuous>    MEDICATIONS  (PRN):  acetaminophen   Tablet .. 650 milliGRAM(s) Oral every 6 hours PRN Temp greater or equal to 38C (100.4F), Mild Pain (1 - 3)  ALBUTerol/ipratropium for Nebulization 3 milliLiter(s) Nebulizer every 8 hours PRN Bronchospasm  aluminum hydroxide/magnesium hydroxide/simethicone Suspension 30 milliLiter(s) Oral every 6 hours PRN Dyspepsia  guaiFENesin    Syrup 100 milliGRAM(s) Oral every 6 hours PRN Cough  traMADol 50 milliGRAM(s) Oral every 8 hours PRN Severe Pain (7 - 10)      __________________________________________________  REVIEW OF SYSTEMS:  CONSTITUTIONAL: No fever, no chills  EYES: no acute visual disturbances  NECK: No pain or stiffness  RESPIRATORY: No cough; No shortness of breath  CARDIOVASCULAR: No chest pain, no palpitations  GASTROINTESTINAL: No pain. No nausea or vomiting; No diarrhea   NEUROLOGICAL: No headache or numbness, no tremors  MUSCULOSKELETAL: No joint pain, no muscle pain  GENITOURINARY: no dysuria, no frequency, no hesitancy  PSYCHIATRY: depression, PTSD        Vital Signs Last 24 Hrs  T(C): 37.2 (11 Sep 2019 07:35), Max: 37.2 (11 Sep 2019 07:35)  T(F): 98.9 (11 Sep 2019 07:35), Max: 98.9 (11 Sep 2019 07:35)  HR: 85 (11 Sep 2019 09:09) (70 - 97)  BP: 97/49 (11 Sep 2019 07:35) (83/39 - 97/49)  BP(mean): 56 (11 Sep 2019 02:21) (56 - 56)  RR: 16 (11 Sep 2019 07:35) (14 - 18)  SpO2: 92% (11 Sep 2019 09:09) (92% - 98%)    ________________________________________________  PHYSICAL EXAM:  GENERAL: NAD  HEENT: Normocephalic; conjunctivae and sclerae clear; moist mucous membranes  NECK: supple, no jvd  CHEST/LUNG: Clear to auscultation bilaterally with good air entry   HEART: S1 S2  regular; no murmurs, gallops or rubs  ABDOMEN: Soft, Nontender, Nondistended; Bowel sounds present  EXTREMITIES: no cyanosis; no edema; no calf tenderness  SKIN: warm and dry; no rash  NERVOUS SYSTEM: Awake and alert; oriented to place, person and time; no new deficits      _________________________________________________  LABS:                        7.5    6.01  )-----------( 221      ( 11 Sep 2019 06:26 )             23.5     09-11    137  |  104  |  8   ----------------------------<  75  3.7   |  29  |  0.58    Ca    8.1<L>      11 Sep 2019 06:26  Phos  3.3     09-10  Mg     1.9     09-10          CAPILLARY BLOOD GLUCOSE            RADIOLOGY & ADDITIONAL TESTS:        Plan of care was discussed with patient and /or primary care giver; all questions and concerns were addressed and care was aligned with patient's wishes.

## 2019-09-11 NOTE — PROGRESS NOTE ADULT - PROBLEM SELECTOR PLAN 5
Hgb 7.5, trending down  Likely from iron deficiency   negative heparin antibodies  FOBT pending collection.  No bleeding noted

## 2019-09-11 NOTE — CHART NOTE - NSCHARTNOTEFT_GEN_A_CORE
EVENT: Hypotension 83/39  - Pt's BP was 83/39. Repeated BP was 92/46. Pt denied any dizziness    OBJECTIVE:  Vital Signs Last 24 Hrs  T(C): 36.7 (10 Sep 2019 23:49), Max: 37.6 (10 Sep 2019 16:19)  T(F): 98.1 (10 Sep 2019 23:49), Max: 99.7 (10 Sep 2019 16:19)  HR: 72 (11 Sep 2019 02:21) (72 - 100)  BP: 92/46 (11 Sep 2019 02:21) (83/39 - 96/59)  BP(mean): 56 (11 Sep 2019 02:21) (56 - 56)  RR: 14 (11 Sep 2019 02:21) (14 - 18)  SpO2: 96% (10 Sep 2019 23:49) (96% - 98%)    FOCUSED PHYSICAL EXAM:  Neuro: awake, alert, oriented x 3. No neuro deficit  Cardiovascular: Pulses +2 B/L in lower and upper extremities, HR regular, BP stable, No edema.  Respiratory: Respirations regular, unlabored, breath sounds clear B/L.   GI: Abdomen soft, non-tender, positive bowel sounds.  : no bladder distention noted. No complaints at this time.  Skin: Dry, intact, no bruising, no diaphoresis.    LABS:                        9.2    10.16 )-----------( 252      ( 10 Sep 2019 06:53 )             29.1     09-10    135  |  101  |  8   ----------------------------<  58<L>  3.9   |  31  |  0.61    Ca    8.3<L>      10 Sep 2019 06:53  Phos  3.3     09-10  Mg     1.9     09-10        EKG:   IMGAGING:    ASSESSMENT:  HPI:  53 yo female with PMH of Asthma, presented with SOB, cough and right sided chest pain since yesterday morning. She started feeling short of breath, present at rest, not relieved with Albuterol inhaler, unable to ambulate due to SOB since yesterday. It was associated with wheeze and cough with whitish sputum. She also complains of subjective fever. Chest pain is present on right lower side, 7/10 in severity, on/off, non radiating, stabbing in nature, worse with sitting and moving and lying on that side, relieved with Motrin.     Off note, she quit Heroin abuse on August 3rd and since then has been on Methadone 55mg OD. She goes to the Methadone clinic every day, 6 days a week for her Methadone dose. Clinic #: [phone]-Ext: [phone]. She has taken her dose for today. Primary team to call Clinic to confirm dose. Clinic was closed today. (02 Sep 2019 13:00)      PLAN: NS bolus of 1L over 60 minutes ordered    FOLLOW UP / RESULT: f/u with BP post bolus

## 2019-09-11 NOTE — PROGRESS NOTE ADULT - PROBLEM SELECTOR PLAN 1
CXR: Opacification/consolidation of the right lower lung field.   CT chest: Soft tissue impaction of the right middle and lower lobe   bronchi. Extensive airspace consolidations of the right middle and lower   lobes. Overall findings may reflect severe multifocal pneumonia, however   close follow-up to in short resolution and exclude endobronchial mass.   Mild mediastinal adenopathy.  Will c/w zosyn (Day 9 today), 5 days more left  Blood culture from 9/3 shows Klebsella pneumonia   ID Dr Hopkins following   CT chest: concern for malignancy.   Dr Guo consulted.   Patient is scheduled for IR guided biopsy of right lung 9/12/19  Hem/Onc, Dr. Rosado consulted.

## 2019-09-11 NOTE — PROGRESS NOTE ADULT - SUBJECTIVE AND OBJECTIVE BOX
pt seen and examined, pt hypotensive overnight, no dizziness, sob or palpitation with event   pt requesting methadone this AM,   s/p bolus IV            acetaminophen   Tablet .. 650 milliGRAM(s) Oral every 6 hours PRN  ALBUTerol/ipratropium for Nebulization 3 milliLiter(s) Nebulizer every 8 hours PRN  ALBUTerol/ipratropium for Nebulization 3 milliLiter(s) Nebulizer every 6 hours  aluminum hydroxide/magnesium hydroxide/simethicone Suspension 30 milliLiter(s) Oral every 6 hours PRN  cholecalciferol 1000 Unit(s) Oral daily  docusate sodium 100 milliGRAM(s) Oral daily  folic acid 1 milliGRAM(s) Oral daily  guaiFENesin    Syrup 100 milliGRAM(s) Oral every 6 hours PRN  heparin  Injectable 5000 Unit(s) SubCutaneous every 8 hours  influenza   Vaccine 0.5 milliLiter(s) IntraMuscular once  loratadine 10 milliGRAM(s) Oral daily  methadone    Tablet 55 milliGRAM(s) Oral daily  nicotine - 21 mG/24Hr(s) Patch 1 patch Transdermal daily  piperacillin/tazobactam IVPB. 3.375 Gram(s) IV Intermittent once  piperacillin/tazobactam IVPB.. 3.375 Gram(s) IV Intermittent every 8 hours  senna 1 Tablet(s) Oral at bedtime  sodium chloride 0.9%. 1000 milliLiter(s) IV Continuous <Continuous>  traMADol 50 milliGRAM(s) Oral every 8 hours PRN                            9.2    10.16 )-----------( 252      ( 10 Sep 2019 06:53 )             29.1       Hemoglobin: 9.2 g/dL (09-10 @ 06:53)  Hemoglobin: 9.5 g/dL (09-07 @ 08:03)  Hemoglobin: 9.9 g/dL (09-06 @ 07:03)      09-10    135  |  101  |  8   ----------------------------<  58<L>  3.9   |  31  |  0.61    Ca    8.3<L>      10 Sep 2019 06:53  Phos  3.3     09-10  Mg     1.9     09-10      Creatinine Trend: 0.61<--, 0.57<--, 0.67<--, 0.82<--, 1.04<--, 1.21<--    COAGS:           T(C): 36.7 (09-10-19 @ 23:49), Max: 37.6 (09-10-19 @ 16:19)  HR: 71 (09-11-19 @ 04:20) (71 - 100)  BP: 94/44 (09-11-19 @ 04:20) (83/39 - 96/59)  RR: 14 (09-11-19 @ 02:21) (14 - 18)  SpO2: 96% (09-10-19 @ 23:49) (96% - 98%)  Wt(kg): --    I&O's Summary    Gen: Appears well in NAD  HEENT:  (-)icterus (-)pallor  CV: N S1 S2 1/6 ERYN (+)2 Pulses B/l  Resp:  Clear to ausculatation B/L, normal effort  GI: (+) BS Soft, NT, ND  Lymph:  (-)Edema, (-)obvious lymphadenopathy  Skin: Warm to touch, Normal turgor  Psych: Appropriate mood and affect      TELEMETRY: 	  OFF    ECHO : < from: Transthoracic Echocardiogram (09.03.19 @ 07:20) >  CONCLUSIONS:  1. Normal mitral valve. Mild mitral regurgitation.  2. Normal trileaflet aortic valve.  3. Aortic Root: 3.2 cm.    4. Normal left atrium.  LA volume index = 23 cc/m2.  5. Normal left ventricular internal dimensions and wall  thicknesses.  6. Normal Left Ventricular Systolic Function,  (EF = 55 to  60%)  7. Grade II diastolic dysfunction.  8. Normal right atrium.  9. Normal right ventricular size and systolic function  (TAPSE  2.5cm).  10. RV systolic pressure is normal at  29 mm Hg.  11. There is mild tricuspid regurgitation.  12. Normal pulmonic valve.  13. Normal pericardium with no pericardial effusion.    < end of copied text >      ASSESSMENT/PLAN: 	54y  Female PMH of Asthma, presented with SOB, cough and right sided chest pain since yesterday morning. She started feeling short of breath, present at rest, not relieved with Albuterol inhaler, unable to ambulate due to SOB since yesterday. It was associated with wheeze and cough with whitish sputum found with right lower lobe consolidation and klebsiella bacteremia.    - Echo with normal LV fx ,  Stress test with no ischemia, cardiac markers neg   - ECG with no ischemic changes  - pt is scheduled or Lung BX tomorrow with IR for consolidation ( LLL)   - hold asa at present  , restart post BX and once plt count recovers   - Abx per ID   - GI / DVT prophylaxis,  keep K>4, mag >2.0   - No need for further inpatient cardiac work up.  - Pulmonary f/u  D/W Dr Roberts

## 2019-09-11 NOTE — PROGRESS NOTE ADULT - PROBLEM SELECTOR PLAN 3
p/w Right side chest pain, worse with ambulation, and lying on right side.  CXR; right sided PNA   EKG: no acute changes  Troponin negative  Echo showed grade 2 DD  Stress test negative  most likely due to costochondritis.

## 2019-09-12 LAB
ANION GAP SERPL CALC-SCNC: 3 MMOL/L — LOW (ref 5–17)
BUN SERPL-MCNC: 7 MG/DL — SIGNIFICANT CHANGE UP (ref 7–18)
CALCIUM SERPL-MCNC: 8.9 MG/DL — SIGNIFICANT CHANGE UP (ref 8.4–10.5)
CHLORIDE SERPL-SCNC: 101 MMOL/L — SIGNIFICANT CHANGE UP (ref 96–108)
CO2 SERPL-SCNC: 31 MMOL/L — SIGNIFICANT CHANGE UP (ref 22–31)
CREAT SERPL-MCNC: 0.63 MG/DL — SIGNIFICANT CHANGE UP (ref 0.5–1.3)
GLUCOSE SERPL-MCNC: 59 MG/DL — LOW (ref 70–99)
HCT VFR BLD CALC: 26.3 % — LOW (ref 34.5–45)
HGB BLD-MCNC: 8.3 G/DL — LOW (ref 11.5–15.5)
MCHC RBC-ENTMCNC: 31.1 PG — SIGNIFICANT CHANGE UP (ref 27–34)
MCHC RBC-ENTMCNC: 31.6 GM/DL — LOW (ref 32–36)
MCV RBC AUTO: 98.5 FL — SIGNIFICANT CHANGE UP (ref 80–100)
NRBC # BLD: 0 /100 WBCS — SIGNIFICANT CHANGE UP (ref 0–0)
OB PNL STL: NEGATIVE — SIGNIFICANT CHANGE UP
PLATELET # BLD AUTO: 312 K/UL — SIGNIFICANT CHANGE UP (ref 150–400)
POTASSIUM SERPL-MCNC: 3.8 MMOL/L — SIGNIFICANT CHANGE UP (ref 3.5–5.3)
POTASSIUM SERPL-SCNC: 3.8 MMOL/L — SIGNIFICANT CHANGE UP (ref 3.5–5.3)
RBC # BLD: 2.67 M/UL — LOW (ref 3.8–5.2)
RBC # FLD: 14.4 % — SIGNIFICANT CHANGE UP (ref 10.3–14.5)
SODIUM SERPL-SCNC: 135 MMOL/L — SIGNIFICANT CHANGE UP (ref 135–145)
WBC # BLD: 6.6 K/UL — SIGNIFICANT CHANGE UP (ref 3.8–10.5)
WBC # FLD AUTO: 6.6 K/UL — SIGNIFICANT CHANGE UP (ref 3.8–10.5)

## 2019-09-12 RX ORDER — GUAIFENESIN/DEXTROMETHORPHAN 600MG-30MG
10 TABLET, EXTENDED RELEASE 12 HR ORAL
Refills: 0 | Status: COMPLETED | OUTPATIENT
Start: 2019-09-12 | End: 2019-09-14

## 2019-09-12 RX ADMIN — TRAMADOL HYDROCHLORIDE 50 MILLIGRAM(S): 50 TABLET ORAL at 22:17

## 2019-09-12 RX ADMIN — TRAMADOL HYDROCHLORIDE 50 MILLIGRAM(S): 50 TABLET ORAL at 06:51

## 2019-09-12 RX ADMIN — Medication 1000 UNIT(S): at 11:13

## 2019-09-12 RX ADMIN — Medication 650 MILLIGRAM(S): at 01:54

## 2019-09-12 RX ADMIN — Medication 10 MILLILITER(S): at 06:07

## 2019-09-12 RX ADMIN — TRAMADOL HYDROCHLORIDE 50 MILLIGRAM(S): 50 TABLET ORAL at 14:15

## 2019-09-12 RX ADMIN — PIPERACILLIN AND TAZOBACTAM 25 GRAM(S): 4; .5 INJECTION, POWDER, LYOPHILIZED, FOR SOLUTION INTRAVENOUS at 14:09

## 2019-09-12 RX ADMIN — Medication 650 MILLIGRAM(S): at 17:35

## 2019-09-12 RX ADMIN — Medication 1 PATCH: at 19:33

## 2019-09-12 RX ADMIN — Medication 100 MILLIGRAM(S): at 01:52

## 2019-09-12 RX ADMIN — Medication 1 PATCH: at 07:46

## 2019-09-12 RX ADMIN — TRAMADOL HYDROCHLORIDE 50 MILLIGRAM(S): 50 TABLET ORAL at 05:51

## 2019-09-12 RX ADMIN — TRAMADOL HYDROCHLORIDE 50 MILLIGRAM(S): 50 TABLET ORAL at 15:15

## 2019-09-12 RX ADMIN — Medication 10 MILLILITER(S): at 11:15

## 2019-09-12 RX ADMIN — METHADONE HYDROCHLORIDE 55 MILLIGRAM(S): 40 TABLET ORAL at 11:14

## 2019-09-12 RX ADMIN — Medication 1 PATCH: at 14:09

## 2019-09-12 RX ADMIN — Medication 1 MILLIGRAM(S): at 11:13

## 2019-09-12 RX ADMIN — Medication 1 PATCH: at 14:08

## 2019-09-12 RX ADMIN — Medication 650 MILLIGRAM(S): at 16:38

## 2019-09-12 RX ADMIN — Medication 10 MILLILITER(S): at 17:34

## 2019-09-12 RX ADMIN — PIPERACILLIN AND TAZOBACTAM 25 GRAM(S): 4; .5 INJECTION, POWDER, LYOPHILIZED, FOR SOLUTION INTRAVENOUS at 05:52

## 2019-09-12 RX ADMIN — Medication 650 MILLIGRAM(S): at 02:52

## 2019-09-12 RX ADMIN — Medication 100 MILLIGRAM(S): at 11:13

## 2019-09-12 RX ADMIN — LORATADINE 10 MILLIGRAM(S): 10 TABLET ORAL at 11:13

## 2019-09-12 RX ADMIN — TRAMADOL HYDROCHLORIDE 50 MILLIGRAM(S): 50 TABLET ORAL at 23:15

## 2019-09-12 RX ADMIN — SENNA PLUS 1 TABLET(S): 8.6 TABLET ORAL at 22:18

## 2019-09-12 RX ADMIN — Medication 3 MILLILITER(S): at 14:09

## 2019-09-12 NOTE — PROGRESS NOTE ADULT - PROBLEM SELECTOR PLAN 5
Hgb 8.3  Likely from iron deficiency   negative heparin antibodies  FOBT negative  No bleeding noted

## 2019-09-12 NOTE — PROGRESS NOTE ADULT - SUBJECTIVE AND OBJECTIVE BOX
Time of Visit:  Patient seen and examined.     MEDICATIONS  (STANDING):  ALBUTerol/ipratropium for Nebulization 3 milliLiter(s) Nebulizer every 6 hours  cholecalciferol 1000 Unit(s) Oral daily  docusate sodium 100 milliGRAM(s) Oral daily  folic acid 1 milliGRAM(s) Oral daily  guaiFENesin/dextromethorphan  Syrup 10 milliLiter(s) Oral four times a day  influenza   Vaccine 0.5 milliLiter(s) IntraMuscular once  loratadine 10 milliGRAM(s) Oral daily  methadone    Tablet 55 milliGRAM(s) Oral daily  nicotine - 21 mG/24Hr(s) Patch 1 patch Transdermal daily  piperacillin/tazobactam IVPB. 3.375 Gram(s) IV Intermittent once  senna 1 Tablet(s) Oral at bedtime  sodium chloride 0.9%. 1000 milliLiter(s) (100 mL/Hr) IV Continuous <Continuous>      MEDICATIONS  (PRN):  acetaminophen   Tablet .. 650 milliGRAM(s) Oral every 6 hours PRN Temp greater or equal to 38C (100.4F), Mild Pain (1 - 3)  ALBUTerol/ipratropium for Nebulization 3 milliLiter(s) Nebulizer every 8 hours PRN Bronchospasm  aluminum hydroxide/magnesium hydroxide/simethicone Suspension 30 milliLiter(s) Oral every 6 hours PRN Dyspepsia  traMADol 50 milliGRAM(s) Oral every 8 hours PRN Severe Pain (7 - 10)       Medications up to date at time of exam.    ROS; No fever, chills, cough, congestion. Verbalized of SOB on exertion, none on exam and per pt not worsening.     PHYSICAL EXAMINATION:    Vital Signs Last 24 Hrs  T(C): 37 (12 Sep 2019 07:46), Max: 37 (12 Sep 2019 07:46)  T(F): 98.6 (12 Sep 2019 07:46), Max: 98.6 (12 Sep 2019 07:46)  HR: 73 (12 Sep 2019 07:46) (67 - 73)  BP: 98/50 (12 Sep 2019 07:46) (93/54 - 98/50)  BP(mean): --  RR: 16 (12 Sep 2019 07:46) (16 - 16)  SpO2: 95% (12 Sep 2019 07:46) (95% - 98%)   (if applicable)    General : Alert and oriented. No acute distress.      HEENT: Head is normocephalic and atraumatic. No nasal tenderness. Extraocular muscles are intact. Mucous membranes are moist.     NECK: Supple, no palpable adenopathy.    LUNGS: Clear to auscultation, no wheezing, rales, or rhonchi. No use of accessory muscle.     HEART: S1 S2 Regular rate and no click/ rub.     ABDOMEN: Soft, nontender, and nondistended.  No hepatosplenomegaly is noted. Active bowel sounds.      EXTREMITIES: Without any cyanosis, clubbing, rash, lesions or edema.    NEUROLOGIC: Awake, alert, oriented.     SKIN: Warm and moist. Non diaphoretic.        LABS:                        8.3    6.60  )-----------( 312      ( 12 Sep 2019 11:19 )             26.3     09-12    135  |  101  |  7   ----------------------------<  59<L>  3.8   |  31  |  0.63    Ca    8.9      12 Sep 2019 11:19      RADIOLOGY & ADDITIONAL STUDIES:  EKG:   CT chest:  < from: CT Chest No Cont (09.10.19 @ 09:26) >    EXAM:  CT CHEST                            PROCEDURE DATE:  09/10/2019          INTERPRETATION:  CLINICAL INDICATION: 54 years  Female with TO EVALUATE   RIGHT LUNG LESIONS  AS ABOVE. Pneumonia.    COMPARISON: 9/2/2019    PROCEDURE:   CT of the Chest was performed without intravenous contrast.  Sagittal and coronal reformats were performed.      FINDINGS:    LUNGS AND AIRWAYS: Persistent dense consolidation involving most of the   right middle lobe extending into the right lower lobe. Right lower lobe   air bronchograms present. Diffuse thickening of the bronchovascular   interstitium within the right lung. Right lower lobe dependent   atelectasis. Mild discoid atelectasis in the left lower lobe. Mild   bilateral upper lobe emphysema.    PLEURA: Trace right pleural effusion. No left pleural effusion.    MEDIASTINUM AND GÓMEZ: Right upper paratracheal lymph node measuring 7 mm   in short axis (3:37). Right upper paratracheal lymph node measuring 9 mm   in short axis (3:49). Right lower paratracheal lymph node measuring 1 cm   in short axis (3:58). Enlarged subcarinal lymph node measuring 2.2 cm in   short axis (3:71). Extension of right lung consolidation into the right   hilum. Limited evaluation for vascular invasion or constrictionsecondary   to lack of IV contrast. Complete opacification of the right middle lobe   bronchi.    VESSELS: No evidence of aortic aneurysm. Please see Steinmann hilar   findings.    HEART: Heart size is normal. Trace pericardial effusion.    CHEST WALL AND LOWER NECK: Within normal limits.    VISUALIZED UPPER ABDOMEN: Suspected splenomegaly. The spleen is   incompletely imaged. The adrenal glands are incompletely imaged.    BONES: Mild cervical and thoracic degenerative changes.    IMPRESSION:     Dense consolidation in the right middle and lower lobe with diffuse right   lung bronchovascular thickening as well as mediastinal adenopathy.   Complete opacification of the right middle lobe bronchi. Findings   concerning for underlying neoplasm.    Extension of right lung consolidation into the right hilum. Limited   evaluation for vascular invasion or constriction secondary to lack of IV   contrast.      IMPRESSION: 54y Female PAST MEDICAL & SURGICAL HISTORY:  Asthma  No significant past surgical history     Impression; 55 Y/O Female presented with SOB, Cough, Chest Pain. CT chest show RML bronchus obstruction with mediastinal adenopathy may be due to pan vs malignancy. Repeated CT chest shows no change in lung consolidation. +klebsiella pneum bacteremia 9/3 repeat BC 9/5 neg. HIV negative    Suggestion;  O2 saturation 98% room air. No need for outpatient Oxygen supplementation due to been saturating good room air.    Reinforced the importance of smoking cessation and Daily compliance to Daily medications.   Pending IR uided bx of lung mass bx .  Continue with antibiotics as per ID recommendations.   Continue Duoneb Q 6 hours. Discontinue PRN DuoNeb Q 8 hours. Time of Visit:  Patient seen and examined.     MEDICATIONS  (STANDING):  ALBUTerol/ipratropium for Nebulization 3 milliLiter(s) Nebulizer every 6 hours  cholecalciferol 1000 Unit(s) Oral daily  docusate sodium 100 milliGRAM(s) Oral daily  folic acid 1 milliGRAM(s) Oral daily  guaiFENesin/dextromethorphan  Syrup 10 milliLiter(s) Oral four times a day  influenza   Vaccine 0.5 milliLiter(s) IntraMuscular once  loratadine 10 milliGRAM(s) Oral daily  methadone    Tablet 55 milliGRAM(s) Oral daily  nicotine - 21 mG/24Hr(s) Patch 1 patch Transdermal daily  piperacillin/tazobactam IVPB. 3.375 Gram(s) IV Intermittent once  senna 1 Tablet(s) Oral at bedtime  sodium chloride 0.9%. 1000 milliLiter(s) (100 mL/Hr) IV Continuous <Continuous>      MEDICATIONS  (PRN):  acetaminophen   Tablet .. 650 milliGRAM(s) Oral every 6 hours PRN Temp greater or equal to 38C (100.4F), Mild Pain (1 - 3)  ALBUTerol/ipratropium for Nebulization 3 milliLiter(s) Nebulizer every 8 hours PRN Bronchospasm  aluminum hydroxide/magnesium hydroxide/simethicone Suspension 30 milliLiter(s) Oral every 6 hours PRN Dyspepsia  traMADol 50 milliGRAM(s) Oral every 8 hours PRN Severe Pain (7 - 10)       Medications up to date at time of exam.    ROS; No fever, chills, cough, congestion. Verbalized of SOB on exertion, none on exam and per pt not worsening.     PHYSICAL EXAMINATION:    Vital Signs Last 24 Hrs  T(C): 37 (12 Sep 2019 07:46), Max: 37 (12 Sep 2019 07:46)  T(F): 98.6 (12 Sep 2019 07:46), Max: 98.6 (12 Sep 2019 07:46)  HR: 73 (12 Sep 2019 07:46) (67 - 73)  BP: 98/50 (12 Sep 2019 07:46) (93/54 - 98/50)  BP(mean): --  RR: 16 (12 Sep 2019 07:46) (16 - 16)  SpO2: 95% (12 Sep 2019 07:46) (95% - 98%)   (if applicable)    General : Alert and oriented. No acute distress.      HEENT: Head is normocephalic and atraumatic. No nasal tenderness. Extraocular muscles are intact. Mucous membranes are moist.     NECK: Supple, no palpable adenopathy.    LUNGS: Clear to auscultation, no wheezing, rales, or rhonchi. No use of accessory muscle.     HEART: S1 S2 Regular rate and no click/ rub.     ABDOMEN: Soft, nontender, and nondistended.  No hepatosplenomegaly is noted. Active bowel sounds.      EXTREMITIES: Without any cyanosis, clubbing, rash, lesions or edema.    NEUROLOGIC: Awake, alert, oriented.     SKIN: Warm and moist. Non diaphoretic.        LABS:                        8.3    6.60  )-----------( 312      ( 12 Sep 2019 11:19 )             26.3     09-12    135  |  101  |  7   ----------------------------<  59<L>  3.8   |  31  |  0.63    Ca    8.9      12 Sep 2019 11:19      RADIOLOGY & ADDITIONAL STUDIES:  EKG:   CT chest:  < from: CT Chest No Cont (09.10.19 @ 09:26) >    EXAM:  CT CHEST                            PROCEDURE DATE:  09/10/2019          INTERPRETATION:  CLINICAL INDICATION: 54 years  Female with TO EVALUATE   RIGHT LUNG LESIONS  AS ABOVE. Pneumonia.    COMPARISON: 9/2/2019    PROCEDURE:   CT of the Chest was performed without intravenous contrast.  Sagittal and coronal reformats were performed.      FINDINGS:    LUNGS AND AIRWAYS: Persistent dense consolidation involving most of the   right middle lobe extending into the right lower lobe. Right lower lobe   air bronchograms present. Diffuse thickening of the bronchovascular   interstitium within the right lung. Right lower lobe dependent   atelectasis. Mild discoid atelectasis in the left lower lobe. Mild   bilateral upper lobe emphysema.    PLEURA: Trace right pleural effusion. No left pleural effusion.    MEDIASTINUM AND GÓMEZ: Right upper paratracheal lymph node measuring 7 mm   in short axis (3:37). Right upper paratracheal lymph node measuring 9 mm   in short axis (3:49). Right lower paratracheal lymph node measuring 1 cm   in short axis (3:58). Enlarged subcarinal lymph node measuring 2.2 cm in   short axis (3:71). Extension of right lung consolidation into the right   hilum. Limited evaluation for vascular invasion or constrictionsecondary   to lack of IV contrast. Complete opacification of the right middle lobe   bronchi.    VESSELS: No evidence of aortic aneurysm. Please see Steinmann hilar   findings.    HEART: Heart size is normal. Trace pericardial effusion.    CHEST WALL AND LOWER NECK: Within normal limits.    VISUALIZED UPPER ABDOMEN: Suspected splenomegaly. The spleen is   incompletely imaged. The adrenal glands are incompletely imaged.    BONES: Mild cervical and thoracic degenerative changes.    IMPRESSION:     Dense consolidation in the right middle and lower lobe with diffuse right   lung bronchovascular thickening as well as mediastinal adenopathy.   Complete opacification of the right middle lobe bronchi. Findings   concerning for underlying neoplasm.    Extension of right lung consolidation into the right hilum. Limited   evaluation for vascular invasion or constriction secondary to lack of IV   contrast.      IMPRESSION: 54y Female PAST MEDICAL & SURGICAL HISTORY:  Asthma  No significant past surgical history     Impression; 53 Y/O Female presented with SOB, Cough, Chest Pain. CT chest show RML bronchus obstruction with mediastinal adenopathy may be due to pan vs malignancy. Repeated CT chest shows no change in lung consolidatio vs mass. +klebsiella pneum bacteremia 9/3 repeat BC 9/5 neg. HIV negative. IR refused bx . Schedule for bronch and possible EBUS 9/13    Suggestion;  O2 saturation 98% room air. No need for outpatient Oxygen supplementation due to been saturating good room air.    Reinforced the importance of smoking cessation and Daily compliance to Daily medications.   on to OR 9/13 for bronch and EBUS  NPO after midnight  Continue with antibiotics as per ID recommendations.   Continue Duoneb Q 6 hours. Discontinue PRN DuoNeb Q 8 hours.

## 2019-09-12 NOTE — PROGRESS NOTE ADULT - PROBLEM SELECTOR PLAN 1
CXR: Opacification/consolidation of the right lower lung field.   CT chest: Soft tissue impaction of the right middle and lower lobe   bronchi. Extensive airspace consolidations of the right middle and lower   lobes. Overall findings may reflect severe multifocal pneumonia, however   close follow-up to in short resolution and exclude endobronchial mass.   Mild mediastinal adenopathy.  Will c/w zosyn (Day 10 today), 4 days more left  Blood culture from 9/3 shows Klebsella pneumonia   ID Dr Hopkins following   CT chest: concern for malignancy.   Dr Guo consulted.   Patient is scheduled for bronchoscopy in am  Hem/Onc, Dr. Rosado consulted.

## 2019-09-12 NOTE — PROGRESS NOTE ADULT - SUBJECTIVE AND OBJECTIVE BOX
Patient denies CP, SOB Review of systems otherwise (-)    acetaminophen   Tablet .. 650 milliGRAM(s) Oral every 6 hours PRN  ALBUTerol/ipratropium for Nebulization 3 milliLiter(s) Nebulizer every 8 hours PRN  ALBUTerol/ipratropium for Nebulization 3 milliLiter(s) Nebulizer every 6 hours  aluminum hydroxide/magnesium hydroxide/simethicone Suspension 30 milliLiter(s) Oral every 6 hours PRN  cholecalciferol 1000 Unit(s) Oral daily  docusate sodium 100 milliGRAM(s) Oral daily  folic acid 1 milliGRAM(s) Oral daily  guaiFENesin/dextromethorphan  Syrup 10 milliLiter(s) Oral four times a day  influenza   Vaccine 0.5 milliLiter(s) IntraMuscular once  loratadine 10 milliGRAM(s) Oral daily  methadone    Tablet 55 milliGRAM(s) Oral daily  nicotine - 21 mG/24Hr(s) Patch 1 patch Transdermal daily  piperacillin/tazobactam IVPB. 3.375 Gram(s) IV Intermittent once  piperacillin/tazobactam IVPB.. 3.375 Gram(s) IV Intermittent every 8 hours  senna 1 Tablet(s) Oral at bedtime  sodium chloride 0.9%. 1000 milliLiter(s) IV Continuous <Continuous>  traMADol 50 milliGRAM(s) Oral every 8 hours PRN                            8.3    6.60  )-----------( 312      ( 12 Sep 2019 11:19 )             26.3       Hemoglobin: 8.3 g/dL (09-12 @ 11:19)  Hemoglobin: 7.5 g/dL (09-11 @ 06:26)  Hemoglobin: 9.2 g/dL (09-10 @ 06:53)      09-12    135  |  101  |  7   ----------------------------<  59<L>  3.8   |  31  |  0.63    Ca    8.9      12 Sep 2019 11:19      Creatinine Trend: 0.63<--, 0.58<--, 0.61<--, 0.57<--, 0.67<--, 0.82<--    COAGS:           T(C): 37 (09-12-19 @ 07:46), Max: 37 (09-12-19 @ 07:46)  HR: 73 (09-12-19 @ 07:46) (67 - 73)  BP: 98/50 (09-12-19 @ 07:46) (93/54 - 98/50)  RR: 16 (09-12-19 @ 07:46) (16 - 16)  SpO2: 95% (09-12-19 @ 07:46) (95% - 98%)  Wt(kg): --    I&O's Summary    Gen: Appears well in NAD  HEENT:  (-)icterus (-)pallor  CV: N S1 S2 1/6 ERYN (+)2 Pulses B/l  Resp:  Clear to ausculatation B/L, normal effort  GI: (+) BS Soft, NT, ND  Lymph:  (-)Edema, (-)obvious lymphadenopathy  Skin: Warm to touch, Normal turgor  Psych: Appropriate mood and affect      TELEMETRY: 	  OFF    ECHO : < from: Transthoracic Echocardiogram (09.03.19 @ 07:20) >  CONCLUSIONS:  1. Normal mitral valve. Mild mitral regurgitation.  2. Normal trileaflet aortic valve.  3. Aortic Root: 3.2 cm.    4. Normal left atrium.  LA volume index = 23 cc/m2.  5. Normal left ventricular internal dimensions and wall  thicknesses.  6. Normal Left Ventricular Systolic Function,  (EF = 55 to  60%)  7. Grade II diastolic dysfunction.  8. Normal right atrium.  9. Normal right ventricular size and systolic function  (TAPSE  2.5cm).  10. RV systolic pressure is normal at  29 mm Hg.  11. There is mild tricuspid regurgitation.  12. Normal pulmonic valve.  13. Normal pericardium with no pericardial effusion.    < end of copied text >      ASSESSMENT/PLAN: 	54y  Female PMH of Asthma, presented with SOB, cough and right sided chest pain since yesterday morning. She started feeling short of breath, present at rest, not relieved with Albuterol inhaler, unable to ambulate due to SOB since yesterday. It was associated with wheeze and cough with whitish sputum found with right lower lobe consolidation and klebsiella bacteremia.    - Echo with normal LV fx ,  Stress test with no ischemia, cardiac markers neg   - ECG with no ischemic changes  - pt is scheduled or Lung BX  with IR for consolidation ( LLL)   - hold asa at present  , restart post BX and once plt count recovers   - Abx per ID   - GI / DVT prophylaxis,  keep K>4, mag >2.0   - No need for further inpatient cardiac work up.  - Pulmonary f/u    Rashid Roberts MD, [facility]  BEEPER [phone]

## 2019-09-12 NOTE — CHART NOTE - NSCHARTNOTEFT_GEN_A_CORE
EVENT: Called to bedside to evaluate pt for loud behavior c/o cough "all night" since Robitussin switch from DM    OBJECTIVE:  Vital Signs Last 24 Hrs  T(C): 36.9 (12 Sep 2019 00:02), Max: 37.2 (11 Sep 2019 07:35)  T(F): 98.4 (12 Sep 2019 00:02), Max: 98.9 (11 Sep 2019 07:35)  HR: 67 (12 Sep 2019 00:02) (67 - 87)  BP: 93/54 (12 Sep 2019 00:02) (93/54 - 97/49)  BP(mean): --  RR: 16 (12 Sep 2019 00:02) (16 - 16)  SpO2: 98% (12 Sep 2019 00:02) (92% - 98%)    FOCUSED PHYSICAL EXAM:  GENERAL: Angry female walking bedside  NECK: Supple, No JVD  CHEST/LUNG: mild rhonchi at bases  HEART: Regular rate and rhythm; No murmurs, rubs, or gallops  ABDOMEN: Soft, Nontender, Nondistended, Normal bowel sounds  EXTREMITIES:  2+ Peripheral Pulses, No clubbing, cyanosis, or edema  Neurological: AOx3  Skin: Warm and dry    LABS:                        7.5    6.01  )-----------( 221      ( 11 Sep 2019 06:26 )             23.5     09-11    137  |  104  |  8   ----------------------------<  75  3.7   |  29  |  0.58    Ca    8.1<L>      11 Sep 2019 06:26  Phos  3.3     09-10  Mg     1.9     09-10    ASSESSMENT:  HPI:  55 yo female with PMH of Asthma, presented with SOB, cough and right sided chest pain since yesterday morning. She started feeling short of breath, present at rest, not relieved with Albuterol inhaler, unable to ambulate due to SOB since yesterday. It was associated with wheeze and cough with whitish sputum. She also complains of subjective fever. Chest pain is present on right lower side, 7/10 in severity, on/off, non radiating, stabbing in nature, worse with sitting and moving and lying on that side, relieved with Motrin.   Off note, she quit Heroin abuse on August 3rd and since then has been on Methadone 55mg OD. She goes to the Methadone clinic every day, 6 days a week for her Methadone dose. Clinic #: [phone]-Ext: [phone]. She has taken her dose for today. Primary team to call Clinic to confirm dose. Clinic was closed today. (02 Sep 2019 13:00)    PLAN:   Problem: Cough most likely due to pneumonia of right lower lobe     1. GuaiFENesin/dextromethorphan  Syrup 10 milliLiter(s) Oral four times a day X 2 days  2. Cont ALBUTerol/ipratropium for Nebulization 3 milliLiter(s) Nebulizer every 6 hours  3. Cont loratadine 10 milliGRAM(s) Oral daily  4. Cont piperacillin/tazobactam IVPB.. 3.375 Gram(s) IV Intermittent every 8 hours  5. Cont ALBUTerol/ipratropium for Nebulization 3 milliLiter(s) Nebulizer every 8 hours PRN Bronchospasm

## 2019-09-12 NOTE — PROGRESS NOTE ADULT - SUBJECTIVE AND OBJECTIVE BOX
NP Note discussed with  Primary Attending    53 yo female with PMH of Asthma, presented with SOB, cough and right sided chest pain since yesterday morning. She started feeling short of breath, present at rest, not relieved with Albuterol inhaler. CT chest showed right lung mass with concern for underlying neoplasm. Patient is scheduled for bronchoscopy 9/13/19.        INTERVAL HPI/OVERNIGHT EVENTS: Patient seen and examined at the bedside, she denies acute complaints      MEDICATIONS  (STANDING):  ALBUTerol/ipratropium for Nebulization 3 milliLiter(s) Nebulizer every 6 hours  cholecalciferol 1000 Unit(s) Oral daily  docusate sodium 100 milliGRAM(s) Oral daily  folic acid 1 milliGRAM(s) Oral daily  guaiFENesin/dextromethorphan  Syrup 10 milliLiter(s) Oral four times a day  influenza   Vaccine 0.5 milliLiter(s) IntraMuscular once  loratadine 10 milliGRAM(s) Oral daily  methadone    Tablet 55 milliGRAM(s) Oral daily  nicotine - 21 mG/24Hr(s) Patch 1 patch Transdermal daily  piperacillin/tazobactam IVPB. 3.375 Gram(s) IV Intermittent once  senna 1 Tablet(s) Oral at bedtime  sodium chloride 0.9%. 1000 milliLiter(s) (100 mL/Hr) IV Continuous <Continuous>    MEDICATIONS  (PRN):  acetaminophen   Tablet .. 650 milliGRAM(s) Oral every 6 hours PRN Temp greater or equal to 38C (100.4F), Mild Pain (1 - 3)  ALBUTerol/ipratropium for Nebulization 3 milliLiter(s) Nebulizer every 8 hours PRN Bronchospasm  aluminum hydroxide/magnesium hydroxide/simethicone Suspension 30 milliLiter(s) Oral every 6 hours PRN Dyspepsia  traMADol 50 milliGRAM(s) Oral every 8 hours PRN Severe Pain (7 - 10)      __________________________________________________  REVIEW OF SYSTEMS:  CONSTITUTIONAL: No fever, no chills  EYES: no acute visual disturbances  NECK: No pain or stiffness  RESPIRATORY: No cough; No shortness of breath  CARDIOVASCULAR: No chest pain, no palpitations  GASTROINTESTINAL: No pain. No nausea or vomiting; No diarrhea   NEUROLOGICAL: No headache or numbness, no tremors  MUSCULOSKELETAL: No joint pain, no muscle pain  GENITOURINARY: no dysuria, no frequency, no hesitancy  PSYCHIATRY: depression, PTSD      Vital Signs Last 24 Hrs  T(C): 38.1 (12 Sep 2019 16:39), Max: 38.1 (12 Sep 2019 16:39)  T(F): 100.6 (12 Sep 2019 16:39), Max: 100.6 (12 Sep 2019 16:39)  HR: 87 (12 Sep 2019 15:44) (67 - 87)  BP: 91/60 (12 Sep 2019 15:44) (91/60 - 98/50)  BP(mean): --  RR: 17 (12 Sep 2019 15:44) (16 - 17)  SpO2: 96% (12 Sep 2019 15:44) (95% - 98%)    ________________________________________________  PHYSICAL EXAM:  GENERAL: NAD  HEENT: Normocephalic; conjunctivae and sclerae clear; moist mucous membranes  NECK: supple, no jvd  CHEST/LUNG: Clear to auscultation bilaterally with good air entry   HEART: S1 S2  regular; no murmurs, gallops or rubs  ABDOMEN: Soft, Nontender, Nondistended; Bowel sounds present  EXTREMITIES: no cyanosis; no edema; no calf tenderness  SKIN: warm and dry; no rash  NERVOUS SYSTEM: Awake and alert; oriented to place, person and time; no new deficits      _________________________________________________  LABS:                        8.3    6.60  )-----------( 312      ( 12 Sep 2019 11:19 )             26.3     09-12    135  |  101  |  7   ----------------------------<  59<L>  3.8   |  31  |  0.63    Ca    8.9      12 Sep 2019 11:19          CAPILLARY BLOOD GLUCOSE            RADIOLOGY & ADDITIONAL TESTS:        Consultant(s) Notes Reviewed:   YES    Care Discussed with Consultants : Pulmonary    Plan of care was discussed with patient and /or primary care giver; all questions and concerns were addressed and care was aligned with patient's wishes.

## 2019-09-13 ENCOUNTER — RESULT REVIEW (OUTPATIENT)
Age: 54
End: 2019-09-13

## 2019-09-13 DIAGNOSIS — R91.8 OTHER NONSPECIFIC ABNORMAL FINDING OF LUNG FIELD: ICD-10-CM

## 2019-09-13 DIAGNOSIS — Z29.9 ENCOUNTER FOR PROPHYLACTIC MEASURES, UNSPECIFIED: ICD-10-CM

## 2019-09-13 LAB
ABO RH CONFIRMATION: SIGNIFICANT CHANGE UP
APTT BLD: 62.3 SEC — HIGH (ref 27.5–36.3)
B PERT IGG+IGM PNL SER: ABNORMAL
BLD GP AB SCN SERPL QL: SIGNIFICANT CHANGE UP
COLOR FLD: SIGNIFICANT CHANGE UP
FLUID INTAKE SUBSTANCE CLASS: SIGNIFICANT CHANGE UP
FLUID SEGMENTED GRANULOCYTES: 92 % — SIGNIFICANT CHANGE UP
GLUCOSE BLDC GLUCOMTR-MCNC: 64 MG/DL — LOW (ref 70–99)
HCG UR QL: NEGATIVE — SIGNIFICANT CHANGE UP
INR BLD: 1.34 RATIO — HIGH (ref 0.88–1.16)
LYMPHOCYTES # FLD: 7 % — SIGNIFICANT CHANGE UP
MONOS+MACROS # FLD: 1 % — SIGNIFICANT CHANGE UP
PROTHROM AB SERPL-ACNC: 15 SEC — HIGH (ref 10–12.9)
RCV VOL RI: 155 /UL — HIGH (ref 0–5)
TOTAL NUCLEATED CELL COUNT, BODY FLUID: 1055 /UL — HIGH (ref 0–5)
TUBE TYPE: SIGNIFICANT CHANGE UP

## 2019-09-13 PROCEDURE — 88305 TISSUE EXAM BY PATHOLOGIST: CPT | Mod: 26

## 2019-09-13 PROCEDURE — 88112 CYTOPATH CELL ENHANCE TECH: CPT | Mod: 26

## 2019-09-13 RX ORDER — ONDANSETRON 8 MG/1
4 TABLET, FILM COATED ORAL ONCE
Refills: 0 | Status: DISCONTINUED | OUTPATIENT
Start: 2019-09-13 | End: 2019-09-13

## 2019-09-13 RX ORDER — SODIUM CHLORIDE 9 MG/ML
1000 INJECTION, SOLUTION INTRAVENOUS
Refills: 0 | Status: DISCONTINUED | OUTPATIENT
Start: 2019-09-13 | End: 2019-09-15

## 2019-09-13 RX ORDER — SODIUM CHLORIDE 9 MG/ML
1000 INJECTION, SOLUTION INTRAVENOUS
Refills: 0 | Status: DISCONTINUED | OUTPATIENT
Start: 2019-09-13 | End: 2019-09-13

## 2019-09-13 RX ORDER — PIPERACILLIN AND TAZOBACTAM 4; .5 G/20ML; G/20ML
3.38 INJECTION, POWDER, LYOPHILIZED, FOR SOLUTION INTRAVENOUS EVERY 8 HOURS
Refills: 0 | Status: COMPLETED | OUTPATIENT
Start: 2019-09-13 | End: 2019-09-18

## 2019-09-13 RX ORDER — HYDROMORPHONE HYDROCHLORIDE 2 MG/ML
0.5 INJECTION INTRAMUSCULAR; INTRAVENOUS; SUBCUTANEOUS
Refills: 0 | Status: DISCONTINUED | OUTPATIENT
Start: 2019-09-13 | End: 2019-09-13

## 2019-09-13 RX ORDER — PANTOPRAZOLE SODIUM 20 MG/1
40 TABLET, DELAYED RELEASE ORAL
Refills: 0 | Status: DISCONTINUED | OUTPATIENT
Start: 2019-09-13 | End: 2019-09-24

## 2019-09-13 RX ORDER — HEPARIN SODIUM 5000 [USP'U]/ML
5000 INJECTION INTRAVENOUS; SUBCUTANEOUS EVERY 8 HOURS
Refills: 0 | Status: DISCONTINUED | OUTPATIENT
Start: 2019-09-13 | End: 2019-09-16

## 2019-09-13 RX ADMIN — Medication 1 PATCH: at 07:16

## 2019-09-13 RX ADMIN — Medication 3 MILLILITER(S): at 08:12

## 2019-09-13 RX ADMIN — Medication 100 MILLIGRAM(S): at 11:02

## 2019-09-13 RX ADMIN — PIPERACILLIN AND TAZOBACTAM 25 GRAM(S): 4; .5 INJECTION, POWDER, LYOPHILIZED, FOR SOLUTION INTRAVENOUS at 22:34

## 2019-09-13 RX ADMIN — Medication 1 PATCH: at 11:01

## 2019-09-13 RX ADMIN — Medication 1 MILLIGRAM(S): at 11:02

## 2019-09-13 RX ADMIN — Medication 3 MILLILITER(S): at 20:43

## 2019-09-13 RX ADMIN — Medication 1 PATCH: at 19:15

## 2019-09-13 RX ADMIN — Medication 10 MILLILITER(S): at 11:02

## 2019-09-13 RX ADMIN — Medication 1 PATCH: at 11:02

## 2019-09-13 RX ADMIN — PIPERACILLIN AND TAZOBACTAM 25 GRAM(S): 4; .5 INJECTION, POWDER, LYOPHILIZED, FOR SOLUTION INTRAVENOUS at 17:07

## 2019-09-13 RX ADMIN — Medication 10 MILLILITER(S): at 17:07

## 2019-09-13 RX ADMIN — Medication 1000 UNIT(S): at 11:02

## 2019-09-13 RX ADMIN — SENNA PLUS 1 TABLET(S): 8.6 TABLET ORAL at 22:34

## 2019-09-13 RX ADMIN — TRAMADOL HYDROCHLORIDE 50 MILLIGRAM(S): 50 TABLET ORAL at 17:07

## 2019-09-13 RX ADMIN — METHADONE HYDROCHLORIDE 55 MILLIGRAM(S): 40 TABLET ORAL at 11:02

## 2019-09-13 RX ADMIN — Medication 10 MILLILITER(S): at 05:50

## 2019-09-13 RX ADMIN — TRAMADOL HYDROCHLORIDE 50 MILLIGRAM(S): 50 TABLET ORAL at 18:02

## 2019-09-13 RX ADMIN — TRAMADOL HYDROCHLORIDE 50 MILLIGRAM(S): 50 TABLET ORAL at 07:16

## 2019-09-13 RX ADMIN — Medication 10 MILLILITER(S): at 00:06

## 2019-09-13 RX ADMIN — TRAMADOL HYDROCHLORIDE 50 MILLIGRAM(S): 50 TABLET ORAL at 06:21

## 2019-09-13 RX ADMIN — LORATADINE 10 MILLIGRAM(S): 10 TABLET ORAL at 11:02

## 2019-09-13 NOTE — PROGRESS NOTE ADULT - SUBJECTIVE AND OBJECTIVE BOX
Patient denies CP, SOB Review of systems otherwise (-)    acetaminophen   Tablet .. 650 milliGRAM(s) Oral every 6 hours PRN  ALBUTerol/ipratropium for Nebulization 3 milliLiter(s) Nebulizer every 8 hours PRN  ALBUTerol/ipratropium for Nebulization 3 milliLiter(s) Nebulizer every 6 hours  aluminum hydroxide/magnesium hydroxide/simethicone Suspension 30 milliLiter(s) Oral every 6 hours PRN  cholecalciferol 1000 Unit(s) Oral daily  docusate sodium 100 milliGRAM(s) Oral daily  folic acid 1 milliGRAM(s) Oral daily  guaiFENesin/dextromethorphan  Syrup 10 milliLiter(s) Oral four times a day  influenza   Vaccine 0.5 milliLiter(s) IntraMuscular once  loratadine 10 milliGRAM(s) Oral daily  methadone    Tablet 55 milliGRAM(s) Oral daily  nicotine - 21 mG/24Hr(s) Patch 1 patch Transdermal daily  piperacillin/tazobactam IVPB. 3.375 Gram(s) IV Intermittent once  piperacillin/tazobactam IVPB.. 3.375 Gram(s) IV Intermittent every 8 hours  senna 1 Tablet(s) Oral at bedtime  sodium chloride 0.9%. 1000 milliLiter(s) IV Continuous <Continuous>  traMADol 50 milliGRAM(s) Oral every 8 hours PRN                            8.3    6.60  )-----------( 312      ( 12 Sep 2019 11:19 )             26.3       Hemoglobin: 8.3 g/dL (09-12 @ 11:19)  Hemoglobin: 7.5 g/dL (09-11 @ 06:26)  Hemoglobin: 9.2 g/dL (09-10 @ 06:53)      09-12    135  |  101  |  7   ----------------------------<  59<L>  3.8   |  31  |  0.63    Ca    8.9      12 Sep 2019 11:19      Creatinine Trend: 0.63<--, 0.58<--, 0.61<--, 0.57<--, 0.67<--, 0.82<--    COAGS: PT/INR - ( 13 Sep 2019 09:08 )   PT: 15.0 sec;   INR: 1.34 ratio         PTT - ( 13 Sep 2019 09:08 )  PTT:62.3 sec          T(C): 37.4 (09-13-19 @ 02:44), Max: 38.1 (09-12-19 @ 16:39)  HR: 78 (09-13-19 @ 00:17) (78 - 87)  BP: 101/50 (09-13-19 @ 00:17) (91/60 - 101/50)  RR: 16 (09-13-19 @ 00:17) (16 - 17)  SpO2: 99% (09-13-19 @ 00:17) (96% - 99%)  Wt(kg): --    I&O's Summary    Gen: Appears well in NAD  HEENT:  (-)icterus (-)pallor  CV: N S1 S2 1/6 ERYN (+)2 Pulses B/l  Resp:  Clear to ausculatation B/L, normal effort  GI: (+) BS Soft, NT, ND  Lymph:  (-)Edema, (-)obvious lymphadenopathy  Skin: Warm to touch, Normal turgor  Psych: Appropriate mood and affect      TELEMETRY: 	  OFF    ECHO : < from: Transthoracic Echocardiogram (09.03.19 @ 07:20) >  CONCLUSIONS:  1. Normal mitral valve. Mild mitral regurgitation.  2. Normal trileaflet aortic valve.  3. Aortic Root: 3.2 cm.    4. Normal left atrium.  LA volume index = 23 cc/m2.  5. Normal left ventricular internal dimensions and wall  thicknesses.  6. Normal Left Ventricular Systolic Function,  (EF = 55 to  60%)  7. Grade II diastolic dysfunction.  8. Normal right atrium.  9. Normal right ventricular size and systolic function  (TAPSE  2.5cm).  10. RV systolic pressure is normal at  29 mm Hg.  11. There is mild tricuspid regurgitation.  12. Normal pulmonic valve.  13. Normal pericardium with no pericardial effusion.    < end of copied text >      ASSESSMENT/PLAN: 	54y  Female PMH of Asthma, presented with SOB, cough and right sided chest pain since yesterday morning. She started feeling short of breath, present at rest, not relieved with Albuterol inhaler, unable to ambulate due to SOB since yesterday. It was associated with wheeze and cough with whitish sputum found with right lower lobe consolidation and klebsiella bacteremia.    - Echo with normal LV fx ,  Stress test with no ischemia, cardiac markers neg   - ECG with no ischemic changes  - pt is scheduled or Lung BX  with IR for consolidation ( LLL)   - hold asa at present    - Abx per ID   - No need for further inpatient cardiac work up.  - Pulmonary f/u  - Optimized for proposed bx from a cardiac prospective    Rashid Roberts MD, [facility]  BEEPER [phone]

## 2019-09-13 NOTE — PROGRESS NOTE ADULT - SUBJECTIVE AND OBJECTIVE BOX
NP Note discussed with  Primary Attending    Patient is a 54y old  Female who presents with a chief complaint of SOB, Right sided CP, cough since yesterday morning (13 Sep 2019 10:26)      INTERVAL HPI/OVERNIGHT EVENTS: no new complaints    MEDICATIONS  (STANDING):  ALBUTerol/ipratropium for Nebulization 3 milliLiter(s) Nebulizer every 6 hours  cholecalciferol 1000 Unit(s) Oral daily  docusate sodium 100 milliGRAM(s) Oral daily  folic acid 1 milliGRAM(s) Oral daily  guaiFENesin/dextromethorphan  Syrup 10 milliLiter(s) Oral four times a day  influenza   Vaccine 0.5 milliLiter(s) IntraMuscular once  loratadine 10 milliGRAM(s) Oral daily  methadone    Tablet 55 milliGRAM(s) Oral daily  nicotine - 21 mG/24Hr(s) Patch 1 patch Transdermal daily  piperacillin/tazobactam IVPB. 3.375 Gram(s) IV Intermittent once  piperacillin/tazobactam IVPB.. 3.375 Gram(s) IV Intermittent every 8 hours  senna 1 Tablet(s) Oral at bedtime  sodium chloride 0.9%. 1000 milliLiter(s) (100 mL/Hr) IV Continuous <Continuous>    MEDICATIONS  (PRN):  acetaminophen   Tablet .. 650 milliGRAM(s) Oral every 6 hours PRN Temp greater or equal to 38C (100.4F), Mild Pain (1 - 3)  ALBUTerol/ipratropium for Nebulization 3 milliLiter(s) Nebulizer every 8 hours PRN Bronchospasm  aluminum hydroxide/magnesium hydroxide/simethicone Suspension 30 milliLiter(s) Oral every 6 hours PRN Dyspepsia  traMADol 50 milliGRAM(s) Oral every 8 hours PRN Severe Pain (7 - 10)      __________________________________________________  REVIEW OF SYSTEMS:    CONSTITUTIONAL: No fever,   EYES: no acute visual disturbances  NECK: No pain or stiffness  RESPIRATORY: No cough; No shortness of breath  CARDIOVASCULAR: No chest pain, no palpitations  GASTROINTESTINAL: No pain. No nausea or vomiting; No diarrhea   NEUROLOGICAL: No headache or numbness, no tremors  MUSCULOSKELETAL: No joint pain, no muscle pain  GENITOURINARY: no dysuria, no frequency, no hesitancy  PSYCHIATRY: no depression , no anxiety  ALL OTHER  ROS negative        Vital Signs Last 24 Hrs  T(C): 37.4 (13 Sep 2019 02:44), Max: 38.1 (12 Sep 2019 16:39)  T(F): 99.4 (13 Sep 2019 02:44), Max: 100.6 (12 Sep 2019 16:39)  HR: 78 (13 Sep 2019 00:17) (78 - 87)  BP: 101/50 (13 Sep 2019 00:17) (91/60 - 101/50)  BP(mean): --  RR: 16 (13 Sep 2019 00:17) (16 - 17)  SpO2: 99% (13 Sep 2019 00:17) (96% - 99%)    ________________________________________________  PHYSICAL EXAM:  GENERAL: NAD  HEENT: Normocephalic;  conjunctivae and sclerae clear; moist mucous membranes;   NECK : supple  CHEST/LUNG: Clear to auscultation bilaterally with good air entry   HEART: S1 S2  regular; no murmurs, gallops or rubs  ABDOMEN: Soft, Nontender, Nondistended; Bowel sounds present  EXTREMITIES: no cyanosis; no edema; no calf tenderness  SKIN: warm and dry; no rash  NERVOUS SYSTEM:  Awake and alert; Oriented  to place, person and time ; no new deficits    _________________________________________________  LABS:                        8.3    6.60  )-----------( 312      ( 12 Sep 2019 11:19 )             26.3     09-12    135  |  101  |  7   ----------------------------<  59<L>  3.8   |  31  |  0.63    Ca    8.9      12 Sep 2019 11:19      PT/INR - ( 13 Sep 2019 09:08 )   PT: 15.0 sec;   INR: 1.34 ratio         PTT - ( 13 Sep 2019 09:08 )  PTT:62.3 sec    CAPILLARY BLOOD GLUCOSE            RADIOLOGY & ADDITIONAL TESTS:    Imaging Personally Reviewed:  YES/NO    Consultant(s) Notes Reviewed:   YES/ No    Care Discussed with Consultants :     Plan of care was discussed with patient and /or primary care giver; all questions and concerns were addressed and care was aligned with patient's wishes. NP Note discussed with  Primary Attending    Patient is a 54y old  Female who presents with a chief complaint of SOB, Right sided CP, cough since yesterday morning (13 Sep 2019 10:26)  HPI  55 yo female with PMH of Asthma, who  presented with SOB, cough and right sided chest pain x 1 day. She started feeling short of breath, present at rest, not relieved with Albuterol inhaler. CXR reveals opacification/consolidation of the RLL for which pt was started on Zosyn, day 11/14 today.  CT chest showed right lung mass with concern for underlying neoplasm . Pt had bronchoscopy today.       INTERVAL HPI/OVERNIGHT EVENTS: Pt seen this morning, reports breathing is easier, but pain to right side continues but less, 4/10. Pt denies fever, cough, sore throat,  chest discomfort, N/V/D, abdominal discomfort.     MEDICATIONS  (STANDING):  ALBUTerol/ipratropium for Nebulization 3 milliLiter(s) Nebulizer every 6 hours  cholecalciferol 1000 Unit(s) Oral daily  docusate sodium 100 milliGRAM(s) Oral daily  folic acid 1 milliGRAM(s) Oral daily  guaiFENesin/dextromethorphan  Syrup 10 milliLiter(s) Oral four times a day  influenza   Vaccine 0.5 milliLiter(s) IntraMuscular once  loratadine 10 milliGRAM(s) Oral daily  methadone    Tablet 55 milliGRAM(s) Oral daily  nicotine - 21 mG/24Hr(s) Patch 1 patch Transdermal daily  piperacillin/tazobactam IVPB. 3.375 Gram(s) IV Intermittent once  piperacillin/tazobactam IVPB.. 3.375 Gram(s) IV Intermittent every 8 hours  senna 1 Tablet(s) Oral at bedtime  sodium chloride 0.9%. 1000 milliLiter(s) (100 mL/Hr) IV Continuous <Continuous>    MEDICATIONS  (PRN):  acetaminophen   Tablet .. 650 milliGRAM(s) Oral every 6 hours PRN Temp greater or equal to 38C (100.4F), Mild Pain (1 - 3)  ALBUTerol/ipratropium for Nebulization 3 milliLiter(s) Nebulizer every 8 hours PRN Bronchospasm  aluminum hydroxide/magnesium hydroxide/simethicone Suspension 30 milliLiter(s) Oral every 6 hours PRN Dyspepsia  traMADol 50 milliGRAM(s) Oral every 8 hours PRN Severe Pain (7 - 10)      __________________________________________________  REVIEW OF SYSTEMS:    CONSTITUTIONAL: No fever,   EYES: no acute visual disturbances  NECK: No pain or stiffness  RESPIRATORY: Less SOB, No cough;   CARDIOVASCULAR: No chest pain, no palpitations  GASTROINTESTINAL: No pain. No nausea or vomiting; No diarrhea   NEUROLOGICAL: No headache or numbness, no tremors  MUSCULOSKELETAL: Right side pain 4/10  GENITOURINARY: no dysuria, no frequency, no hesitancy  PSYCHIATRY: no depression , no anxiety  ALL OTHER  ROS negative        Vital Signs Last 24 Hrs  T(C): 37.4 (13 Sep 2019 02:44), Max: 38.1 (12 Sep 2019 16:39)  T(F): 99.4 (13 Sep 2019 02:44), Max: 100.6 (12 Sep 2019 16:39)  HR: 78 (13 Sep 2019 00:17) (78 - 87)  BP: 101/50 (13 Sep 2019 00:17) (91/60 - 101/50)  BP(mean): --  RR: 16 (13 Sep 2019 00:17) (16 - 17)  SpO2: 99% (13 Sep 2019 00:17) (96% - 99%)    ________________________________________________  PHYSICAL EXAM:  GENERAL: NAD  HEENT: Normocephalic;  conjunctivae and sclerae clear; moist mucous membranes;   NECK : supple  CHEST/LUNG: Effort normal. Clear to auscultation bilaterally with good air entry   HEART: S1 S2  regular; no murmurs, gallops or rubs  ABDOMEN: Soft, Nontender, Nondistended; Bowel sounds present  EXTREMITIES: no cyanosis; no edema; no calf tenderness  SKIN: warm and dry; no rash  NERVOUS SYSTEM:  Awake and alert; Oriented  to place, person and time ; no new deficits    _________________________________________________  LABS:                        8.3    6.60  )-----------( 312      ( 12 Sep 2019 11:19 )             26.3     09-12    135  |  101  |  7   ----------------------------<  59<L>  3.8   |  31  |  0.63    Ca    8.9      12 Sep 2019 11:19      PT/INR - ( 13 Sep 2019 09:08 )   PT: 15.0 sec;   INR: 1.34 ratio         PTT - ( 13 Sep 2019 09:08 )  PTT:62.3 sec    CAPILLARY BLOOD GLUCOSE            RADIOLOGY & ADDITIONAL TESTS:    Imaging Personally Reviewed:  YES/NO    Consultant(s) Notes Reviewed:   YES/ No    Care Discussed with Consultants :     Plan of care was discussed with patient and /or primary care giver; all questions and concerns were addressed and care was aligned with patient's wishes.

## 2019-09-13 NOTE — CHART NOTE - NSCHARTNOTEFT_GEN_A_CORE
I met with pat , her sister and HCP ( cousin) in patient  room. I explained the procedure and reasons why bx was not done. pat informed that CT chest should be repeated in 4-6 weeks . Out pat pul, Heme and medicine f/u.   I reviewed CT with all of them . They understood conversation and all question answered    Representative from pat relations was at bedside witnessed conversation

## 2019-09-13 NOTE — CHART NOTE - NSCHARTNOTEFT_GEN_A_CORE
I had a conversation with pat in OR informing her of abnormal blood test  and will not do bx because of high risk of bleeding. Procedure can be cancel . Pat understood conversation and stated that she want the procedure . BAL was done and routine labs sent.

## 2019-09-13 NOTE — PROGRESS NOTE ADULT - PROBLEM SELECTOR PLAN 3
Right side pain likely costochrondritis  due to pneumonia and lung mass  EKG no acute changes  Troponin negative  Echo showed grade 2 DD  Stress test negative  c/w pain regimen

## 2019-09-14 LAB
GRAM STN FLD: SIGNIFICANT CHANGE UP
NIGHT BLUE STAIN TISS: SIGNIFICANT CHANGE UP
SPECIMEN SOURCE: SIGNIFICANT CHANGE UP
SPECIMEN SOURCE: SIGNIFICANT CHANGE UP

## 2019-09-14 RX ORDER — GUAIFENESIN/DEXTROMETHORPHAN 600MG-30MG
10 TABLET, EXTENDED RELEASE 12 HR ORAL
Refills: 0 | Status: DISCONTINUED | OUTPATIENT
Start: 2019-09-14 | End: 2019-09-14

## 2019-09-14 RX ORDER — GUAIFENESIN/DEXTROMETHORPHAN 600MG-30MG
10 TABLET, EXTENDED RELEASE 12 HR ORAL THREE TIMES A DAY
Refills: 0 | Status: COMPLETED | OUTPATIENT
Start: 2019-09-14 | End: 2019-09-17

## 2019-09-14 RX ORDER — SODIUM CHLORIDE 9 MG/ML
1000 INJECTION, SOLUTION INTRAVENOUS
Refills: 0 | Status: DISCONTINUED | OUTPATIENT
Start: 2019-09-14 | End: 2019-09-14

## 2019-09-14 RX ORDER — SODIUM CHLORIDE 9 MG/ML
1000 INJECTION INTRAMUSCULAR; INTRAVENOUS; SUBCUTANEOUS
Refills: 0 | Status: COMPLETED | OUTPATIENT
Start: 2019-09-14 | End: 2019-09-14

## 2019-09-14 RX ADMIN — Medication 10 MILLILITER(S): at 13:46

## 2019-09-14 RX ADMIN — Medication 100 MILLIGRAM(S): at 12:50

## 2019-09-14 RX ADMIN — TRAMADOL HYDROCHLORIDE 50 MILLIGRAM(S): 50 TABLET ORAL at 14:28

## 2019-09-14 RX ADMIN — Medication 10 MILLILITER(S): at 17:58

## 2019-09-14 RX ADMIN — PANTOPRAZOLE SODIUM 40 MILLIGRAM(S): 20 TABLET, DELAYED RELEASE ORAL at 06:24

## 2019-09-14 RX ADMIN — TRAMADOL HYDROCHLORIDE 50 MILLIGRAM(S): 50 TABLET ORAL at 09:37

## 2019-09-14 RX ADMIN — METHADONE HYDROCHLORIDE 55 MILLIGRAM(S): 40 TABLET ORAL at 09:38

## 2019-09-14 RX ADMIN — Medication 1 PATCH: at 12:50

## 2019-09-14 RX ADMIN — Medication 650 MILLIGRAM(S): at 20:49

## 2019-09-14 RX ADMIN — TRAMADOL HYDROCHLORIDE 50 MILLIGRAM(S): 50 TABLET ORAL at 02:10

## 2019-09-14 RX ADMIN — PIPERACILLIN AND TAZOBACTAM 25 GRAM(S): 4; .5 INJECTION, POWDER, LYOPHILIZED, FOR SOLUTION INTRAVENOUS at 12:51

## 2019-09-14 RX ADMIN — Medication 10 MILLILITER(S): at 01:11

## 2019-09-14 RX ADMIN — PIPERACILLIN AND TAZOBACTAM 25 GRAM(S): 4; .5 INJECTION, POWDER, LYOPHILIZED, FOR SOLUTION INTRAVENOUS at 22:45

## 2019-09-14 RX ADMIN — Medication 3 MILLILITER(S): at 15:23

## 2019-09-14 RX ADMIN — Medication 3 MILLILITER(S): at 20:10

## 2019-09-14 RX ADMIN — SODIUM CHLORIDE 75 MILLILITER(S): 9 INJECTION INTRAMUSCULAR; INTRAVENOUS; SUBCUTANEOUS at 02:12

## 2019-09-14 RX ADMIN — LORATADINE 10 MILLIGRAM(S): 10 TABLET ORAL at 12:50

## 2019-09-14 RX ADMIN — Medication 1 MILLIGRAM(S): at 12:50

## 2019-09-14 RX ADMIN — Medication 1 PATCH: at 19:41

## 2019-09-14 RX ADMIN — Medication 10 MILLILITER(S): at 23:41

## 2019-09-14 RX ADMIN — Medication 1 PATCH: at 12:49

## 2019-09-14 RX ADMIN — Medication 1000 UNIT(S): at 12:50

## 2019-09-14 RX ADMIN — Medication 650 MILLIGRAM(S): at 06:23

## 2019-09-14 RX ADMIN — Medication 1 PATCH: at 08:03

## 2019-09-14 RX ADMIN — TRAMADOL HYDROCHLORIDE 50 MILLIGRAM(S): 50 TABLET ORAL at 01:11

## 2019-09-14 RX ADMIN — Medication 650 MILLIGRAM(S): at 22:47

## 2019-09-14 RX ADMIN — TRAMADOL HYDROCHLORIDE 50 MILLIGRAM(S): 50 TABLET ORAL at 19:00

## 2019-09-14 RX ADMIN — SODIUM CHLORIDE 100 MILLILITER(S): 9 INJECTION, SOLUTION INTRAVENOUS at 17:51

## 2019-09-14 RX ADMIN — PIPERACILLIN AND TAZOBACTAM 25 GRAM(S): 4; .5 INJECTION, POWDER, LYOPHILIZED, FOR SOLUTION INTRAVENOUS at 06:23

## 2019-09-14 RX ADMIN — TRAMADOL HYDROCHLORIDE 50 MILLIGRAM(S): 50 TABLET ORAL at 17:51

## 2019-09-14 RX ADMIN — Medication 650 MILLIGRAM(S): at 07:25

## 2019-09-14 NOTE — PROGRESS NOTE ADULT - SUBJECTIVE AND OBJECTIVE BOX
Patient denies CP, SOB Review of systems otherwise (-)  bp noted, s/p bolus , pt denies sob ,        acetaminophen   Tablet .. 650 milliGRAM(s) Oral every 6 hours PRN  ALBUTerol/ipratropium for Nebulization 3 milliLiter(s) Nebulizer every 8 hours PRN  ALBUTerol/ipratropium for Nebulization 3 milliLiter(s) Nebulizer every 6 hours  aluminum hydroxide/magnesium hydroxide/simethicone Suspension 30 milliLiter(s) Oral every 6 hours PRN  cholecalciferol 1000 Unit(s) Oral daily  dextrose 5% + sodium chloride 0.9%. 1000 milliLiter(s) IV Continuous <Continuous>  docusate sodium 100 milliGRAM(s) Oral daily  folic acid 1 milliGRAM(s) Oral daily  heparin  Injectable 5000 Unit(s) SubCutaneous every 8 hours  influenza   Vaccine 0.5 milliLiter(s) IntraMuscular once  loratadine 10 milliGRAM(s) Oral daily  methadone    Tablet 55 milliGRAM(s) Oral daily  nicotine - 21 mG/24Hr(s) Patch 1 patch Transdermal daily  pantoprazole    Tablet 40 milliGRAM(s) Oral before breakfast  piperacillin/tazobactam IVPB. 3.375 Gram(s) IV Intermittent once  piperacillin/tazobactam IVPB.. 3.375 Gram(s) IV Intermittent every 8 hours  senna 1 Tablet(s) Oral at bedtime  traMADol 50 milliGRAM(s) Oral every 8 hours PRN                            8.3    6.60  )-----------( 312      ( 12 Sep 2019 11:19 )             26.3       Hemoglobin: 8.3 g/dL (09-12 @ 11:19)  Hemoglobin: 7.5 g/dL (09-11 @ 06:26)  Hemoglobin: 9.2 g/dL (09-10 @ 06:53)      09-12    135  |  101  |  7   ----------------------------<  59<L>  3.8   |  31  |  0.63    Ca    8.9      12 Sep 2019 11:19      Creatinine Trend: 0.63<--, 0.58<--, 0.61<--, 0.57<--, 0.67<--, 0.82<--    COAGS: PT/INR - ( 13 Sep 2019 09:08 )   PT: 15.0 sec;   INR: 1.34 ratio         PTT - ( 13 Sep 2019 09:08 )  PTT:62.3 sec          T(C): 36.5 (09-14-19 @ 00:07), Max: 37.4 (09-13-19 @ 15:12)  HR: 69 (09-14-19 @ 00:07) (61 - 108)  BP: 96/45 (09-14-19 @ 00:07) (91/53 - 127/97)  RR: 15 (09-14-19 @ 00:07) (13 - 20)  SpO2: 94% (09-14-19 @ 00:07) (88% - 100%)  Wt(kg): --    I&O's Summary    13 Sep 2019 07:01  -  14 Sep 2019 04:19  --------------------------------------------------------  IN: 600 mL / OUT: 0 mL / NET: 600 mL        Gen: Appears well in NAD  HEENT:  (-)icterus (-)pallor  CV: N S1 S2 1/6 ERYN (+)2 Pulses B/l  Resp:  Clear to ausculatation B/L, normal effort  GI: (+) BS Soft, NT, ND  Lymph:  (-)Edema, (-)obvious lymphadenopathy  Skin: Warm to touch, Normal turgor  Psych: Appropriate mood and affect      TELEMETRY: 	  OFF    ECHO : < from: Transthoracic Echocardiogram (09.03.19 @ 07:20) >  CONCLUSIONS:  1. Normal mitral valve. Mild mitral regurgitation.  2. Normal trileaflet aortic valve.  3. Aortic Root: 3.2 cm.    4. Normal left atrium.  LA volume index = 23 cc/m2.  5. Normal left ventricular internal dimensions and wall  thicknesses.  6. Normal Left Ventricular Systolic Function,  (EF = 55 to  60%)  7. Grade II diastolic dysfunction.  8. Normal right atrium.  9. Normal right ventricular size and systolic function  (TAPSE  2.5cm).  10. RV systolic pressure is normal at  29 mm Hg.  11. There is mild tricuspid regurgitation.  12. Normal pulmonic valve.  13. Normal pericardium with no pericardial effusion.    < end of copied text >      ASSESSMENT/PLAN: 	54y  Female PMH of Asthma, presented with SOB, cough and right sided chest pain since yesterday morning. She started feeling short of breath, present at rest, not relieved with Albuterol inhaler, unable to ambulate due to SOB since yesterday. It was associated with wheeze and cough with whitish sputum found with right lower lobe consolidation and klebsiella bacteremia. aborted Bx due elevated ptt.     - heme consult for elevated PTT> ,   - Echo with normal LV fx ,  Stress test with no ischemia, cardiac markers neg   - ECG with no ischemic changes  - pt is scheduled or Lung BX  with IR for consolidation ( LLL)   - hold asa at present    - Abx per ID   - No need for further inpatient cardiac work up.  - Pulmonary f/u  - Optimized for proposed bx from a cardiac prospective  D/W Dr Reid

## 2019-09-14 NOTE — PROGRESS NOTE ADULT - SUBJECTIVE AND OBJECTIVE BOX
Patient is a 54y old  Female who presents with a chief complaint of SOB, Right sided CP, cough since yesterday morning (14 Sep 2019 16:08)    PATIENT IS SEEN AND EXAMINED IN MEDICAL FLOOR.  ALLERGIES:  No Known Allergies    VITALS:    Vital Signs Last 24 Hrs  T(C): 36.6 (14 Sep 2019 15:33), Max: 36.6 (14 Sep 2019 15:33)  T(F): 97.9 (14 Sep 2019 15:33), Max: 97.9 (14 Sep 2019 15:33)  HR: 86 (14 Sep 2019 15:33) (55 - 86)  BP: 100/52 (14 Sep 2019 15:33) (86/40 - 100/52)  BP(mean): --  RR: 16 (14 Sep 2019 15:33) (15 - 16)  SpO2: 96% (14 Sep 2019 15:33) (94% - 96%)    LABS:    PT/INR - ( 13 Sep 2019 09:08 )   PT: 15.0 sec;   INR: 1.34 ratio         PTT - ( 13 Sep 2019 09:08 )  PTT:62.3 sec      Creatinine Trend: 0.63<--, 0.58<--, 0.61<--, 0.57<--, 0.67<--, 0.82<--  I&O's Summary    13 Sep 2019 07:01  -  14 Sep 2019 07:00  --------------------------------------------------------  IN: 600 mL / OUT: 0 mL / NET: 600 mL        BAL  09-14 @ 00:30   No growth to date.  --    Few polymorphonuclear leukocytes per low power field  Rare Squamous epithelial cells per low power field  No organisms seen per oil power field      .Blood  09-05 @ 11:46   No growth at 5 days.  --  --      .Urine  09-04 @ 01:22   No growth  --  --      .Blood  09-03 @ 01:11   Growth in aerobic and anaerobic bottles: Klebsiella pneumoniae  "Due to technical problems, Proteus sp. will Not be reported as part of  the BCID panel until further notice"  ***Blood Panel PCR results on this specimen are available  approximately 3 hours after the Gram stain result.***  Gram stain, PCR, and/or culture results may not always  correspond due to difference in methodologies.  ************************************************************  This PCR assay was performed using [employer].  The following targets are tested for: Enterococcus,  vancomycin resistant enterococci, Listeria monocytogenes,  coagulase negative staphylococci, S. aureus,  methicillin resistant S. aureus, Streptococcus agalactiae  (Group B), S. pneumoniae, S. pyogenes (Group A),  Acinetobacter baumannii, Enterobacter cloacae, E. coli,  Klebsiella oxytoca, K. pneumoniae, Proteus sp.,  Serratia marcescens, Haemophilus influenzae,  Neisseria meningitidis, Pseudomonas aeruginosa, Candida  albicans, C. glabrata, C krusei, C parapsilosis,  C. tropicalis and the KPC resistance gene.  --  Blood Culture PCR  Klebsiella pneumoniae          MEDICATIONS:    MEDICATIONS  (STANDING):  ALBUTerol/ipratropium for Nebulization 3 milliLiter(s) Nebulizer every 6 hours  cholecalciferol 1000 Unit(s) Oral daily  dextrose 5% + sodium chloride 0.9%. 1000 milliLiter(s) (100 mL/Hr) IV Continuous <Continuous>  docusate sodium 100 milliGRAM(s) Oral daily  folic acid 1 milliGRAM(s) Oral daily  guaiFENesin/dextromethorphan  Syrup 10 milliLiter(s) Oral four times a day  heparin  Injectable 5000 Unit(s) SubCutaneous every 8 hours  influenza   Vaccine 0.5 milliLiter(s) IntraMuscular once  loratadine 10 milliGRAM(s) Oral daily  methadone    Tablet 55 milliGRAM(s) Oral daily  nicotine - 21 mG/24Hr(s) Patch 1 patch Transdermal daily  pantoprazole    Tablet 40 milliGRAM(s) Oral before breakfast  piperacillin/tazobactam IVPB. 3.375 Gram(s) IV Intermittent once  piperacillin/tazobactam IVPB.. 3.375 Gram(s) IV Intermittent every 8 hours  senna 1 Tablet(s) Oral at bedtime      MEDICATIONS  (PRN):  acetaminophen   Tablet .. 650 milliGRAM(s) Oral every 6 hours PRN Temp greater or equal to 38C (100.4F), Mild Pain (1 - 3)  ALBUTerol/ipratropium for Nebulization 3 milliLiter(s) Nebulizer every 8 hours PRN Bronchospasm  aluminum hydroxide/magnesium hydroxide/simethicone Suspension 30 milliLiter(s) Oral every 6 hours PRN Dyspepsia  traMADol 50 milliGRAM(s) Oral every 8 hours PRN Severe Pain (7 - 10)      REVIEW OF SYSTEMS:                           ALL ROS DONE [ X   ]    CONSTITUTIONAL:  LETHARGIC [   ], FEVER [   ], UNRESPONSIVE [   ]  CVS:  CP  [   ], SOB, [   ], PALPITATIONS [   ], DIZZYNESS [   ]  RS: COUGH [   ], SPUTUM [   ]  GI: ABDOMINAL PAIN [   ], NAUSEA [   ], VOMITINGS [   ], DIARRHEA [   ], CONSTIPATION [   ]  :  DYSURIA [   ], NOCTURIA [   ], INCREASED FREQUENCY [   ], DRIBLING [   ],  SKELETAL: PAINFUL JOINTS [   ], SWOLLEN JOINTS [   ], NECK ACHE [   ], LOW BACK ACHE [   ],  SKIN : ULCERS [   ], RASH [   ], ITCHING [   ]  CNS: HEAD ACHE [   ], DOUBLE VISION [   ], BLURRED VISION [   ], AMS / CONFUSION [   ], SEIZURES [   ], WEAKNESS [   ],TINGLING / NUMBNESS [   ]    PHYSICAL EXAMINATION:  GENERAL APPEARANCE: NO DISTRESS  HEENT:  NO PALLOR, NO  JVD,  NO   NODES, NECK SUPPLE  CVS: S1 +, S2 +,   RS: AEEB,  OCCASIONAL  RALES +,   NO RONCHI  ABD: SOFT, NT, NO, BS +  EXT: NO PE  SKIN: WARM,   SKELETAL:  ROM ACCEPTABLE  CNS:  AAO X 3   , NO  DEFICITS    RADIOLOGY :  < from: CT Chest No Cont (09.02.19 @ 12:33) >  IMPRESSION: Soft tissue impaction of the right middle and lower lobe   bronchi. Extensive airspace consolidations ofthe right middle and lower   lobes. Overall findings may reflect severe multifocal pneumonia, however   close follow-up to in short resolution and exclude endobronchial mass.   Mild mediastinal adenopathy.    < end of copied text >      ASSESSMENT :     Lobar pneumonia  Asthma      PLAN:  HPI:  55 yo female with PMH of Asthma, presented with SOB, cough and right sided chest pain since yesterday morning. She started feeling short of breath, present at rest, not relieved with Albuterol inhaler, unable to ambulate due to SOB since yesterday. It was associated with wheeze and cough with whitish sputum. She also complains of subjective fever. Chest pain is present on right lower side, 7/10 in severity, on/off, non radiating, stabbing in nature, worse with sitting and moving and lying on that side, relieved with Motrin.     Off note, she quit Heroin abuse on August 3rd and since then has been on Methadone 55mg OD. She goes to the Methadone clinic every day, 6 days a week for her Methadone dose. Clinic #: [phone]-Ext: [phone]. She has taken her dose for today. Primary team to call Clinic to confirm dose. Clinic was closed today. (02 Sep 2019 13:00)    - S/P BRONCHOSCOPY , PROLONGED INR . BX COULD NOT BE DONE, HAD BAL. PATIENT IS EXPLAINED TO F/UP DR. DOZIER, PULMONARY CONSULTANT AS OUT PATIENT IN FEW WEEKS AFTER COMPLETING ANTIBIOTICS & RPT CHEST CT. LUNG BX IF NEEDED AS OUT PATIENT IN FEW WEEKS, TO EVALUATE FOR MALIGNANCY. AND ALSO TO F/UP DR. HERNANDEZ , ONCOLOGIST AS OUT PATIENT  -  DC PLAN / TRANSFER TO IN PATIENT REHAB  - MULTIFOCAL PNEUMONIA, SUSPECT ASPIRATION PNEUMONIA, BACTEREMIA  ON IV ZOSYN  - CHEST PAIN, PLEURITIC  ON TRAMADOL, TYLENOL  - PULMONARY F/UP IS IN PROGRESS. MAY NEED BRONCHOSCOPY  - SUBSTANCE ABUSE , SW TO COORDINATE WITH PATIENT FOR OUT PATIENT REHAB   - GI AND DVT PROPHYLAXIS  - DR. TOVAR

## 2019-09-14 NOTE — CHART NOTE - NSCHARTNOTEFT_GEN_A_CORE
EVENT: Hypotension - 96/45  - Pt is s/p bronchoscopy on 9/13. Had not eaten or drink any thing.     OBJECTIVE:  Vital Signs Last 24 Hrs  T(C): 36.5 (14 Sep 2019 00:07), Max: 37.4 (13 Sep 2019 02:44)  T(F): 97.7 (14 Sep 2019 00:07), Max: 99.4 (13 Sep 2019 02:44)  HR: 69 (14 Sep 2019 00:07) (61 - 108)  BP: 96/45 (14 Sep 2019 00:07) (91/53 - 127/97)  BP(mean): --  RR: 15 (14 Sep 2019 00:07) (13 - 20)  SpO2: 94% (14 Sep 2019 00:07) (88% - 100%)    FOCUSED PHYSICAL EXAM:  Neuro: awake, alert, oriented x 3. No neuro deficit  Cardiovascular: Pulses +2 B/L in lower and upper extremities, HR regular, BP stable, No edema.  Respiratory: Respirations regular, unlabored, breath sounds clear B/L.   GI: Abdomen soft, non-tender, positive bowel sounds.  : no bladder distention noted. No complaints at this time.  Skin: Dry, intact, no bruising, no diaphoresis.    LABS:                        8.3    6.60  )-----------( 312      ( 12 Sep 2019 11:19 )             26.3     09-12    135  |  101  |  7   ----------------------------<  59<L>  3.8   |  31  |  0.63    Ca    8.9      12 Sep 2019 11:19        EKG:   IMGAGING:    ASSESSMENT:  HPI:  55 yo female with PMH of Asthma, presented with SOB, cough and right sided chest pain since yesterday morning. She started feeling short of breath, present at rest, not relieved with Albuterol inhaler, unable to ambulate due to SOB since yesterday. It was associated with wheeze and cough with whitish sputum. She also complains of subjective fever. Chest pain is present on right lower side, 7/10 in severity, on/off, non radiating, stabbing in nature, worse with sitting and moving and lying on that side, relieved with Motrin.     Off note, she quit Heroin abuse on August 3rd and since then has been on Methadone 55mg OD. She goes to the Methadone clinic every day, 6 days a week for her Methadone dose. Clinic #: [phone]-Ext: [phone]. She has taken her dose for today. Primary team to call Clinic to confirm dose. Clinic was closed today. (02 Sep 2019 13:00)      PLAN: Order 1L of NS @ 75cc/hr x 1 bag

## 2019-09-14 NOTE — PROGRESS NOTE ADULT - SUBJECTIVE AND OBJECTIVE BOX
Time of Visit:  Patient seen and examined.     MEDICATIONS  (STANDING):  ALBUTerol/ipratropium for Nebulization 3 milliLiter(s) Nebulizer every 6 hours  cholecalciferol 1000 Unit(s) Oral daily  dextrose 5% + sodium chloride 0.9%. 1000 milliLiter(s) (100 mL/Hr) IV Continuous <Continuous>  docusate sodium 100 milliGRAM(s) Oral daily  folic acid 1 milliGRAM(s) Oral daily  guaiFENesin/dextromethorphan  Syrup 10 milliLiter(s) Oral four times a day  heparin  Injectable 5000 Unit(s) SubCutaneous every 8 hours  influenza   Vaccine 0.5 milliLiter(s) IntraMuscular once  loratadine 10 milliGRAM(s) Oral daily  methadone    Tablet 55 milliGRAM(s) Oral daily  nicotine - 21 mG/24Hr(s) Patch 1 patch Transdermal daily  pantoprazole    Tablet 40 milliGRAM(s) Oral before breakfast  piperacillin/tazobactam IVPB. 3.375 Gram(s) IV Intermittent once  piperacillin/tazobactam IVPB.. 3.375 Gram(s) IV Intermittent every 8 hours  senna 1 Tablet(s) Oral at bedtime      MEDICATIONS  (PRN):  acetaminophen   Tablet .. 650 milliGRAM(s) Oral every 6 hours PRN Temp greater or equal to 38C (100.4F), Mild Pain (1 - 3)  ALBUTerol/ipratropium for Nebulization 3 milliLiter(s) Nebulizer every 8 hours PRN Bronchospasm  aluminum hydroxide/magnesium hydroxide/simethicone Suspension 30 milliLiter(s) Oral every 6 hours PRN Dyspepsia  traMADol 50 milliGRAM(s) Oral every 8 hours PRN Severe Pain (7 - 10)       Medications up to date at time of exam.      PHYSICAL EXAMINATION:  Patient has no new complaints.  GENERAL: The patient is a well-developed, well-nourished, in no apparent distress.     Vital Signs Last 24 Hrs  T(C): 36.6 (14 Sep 2019 15:33), Max: 37.2 (13 Sep 2019 16:30)  T(F): 97.9 (14 Sep 2019 15:33), Max: 99 (13 Sep 2019 16:30)  HR: 86 (14 Sep 2019 15:33) (55 - 88)  BP: 100/52 (14 Sep 2019 15:33) (86/40 - 127/97)  BP(mean): --  RR: 16 (14 Sep 2019 15:33) (13 - 18)  SpO2: 96% (14 Sep 2019 15:33) (88% - 100%)   (if applicable)    Chest Tube (if applicable)    HEENT: Head is normocephalic and atraumatic. Extraocular muscles are intact. Mucous membranes are moist.     NECK: Supple, no palpable adenopathy.    LUNGS: Clear to auscultation, no wheezing, rales, or rhonchi.    HEART: Regular rate and rhythm without murmur.    ABDOMEN: Soft, nontender, and nondistended.  No hepatosplenomegaly is noted.    : No painful voiding, no pelvic pain    EXTREMITIES: Without any cyanosis, clubbing, rash, lesions or edema.    NEUROLOGIC: Awake, alert, oriented, grossly intact    SKIN: Warm, dry, good turgor.      LABS:          PT/INR - ( 13 Sep 2019 09:08 )   PT: 15.0 sec;   INR: 1.34 ratio         PTT - ( 13 Sep 2019 09:08 )  PTT:62.3 sec                    MICROBIOLOGY: (if applicable)    RADIOLOGY & ADDITIONAL STUDIES:  EKG:   CXR:  ECHO:    IMPRESSION: 54y Female PAST MEDICAL & SURGICAL HISTORY:  Asthma  No significant past surgical history   p/w         Impression; 55 Y/O Female presented with SOB, Cough, Chest Pain. CT chest show RML bronchus obstruction with mediastinal adenopathy may be due to pan vs malignancy. Repeated CT chest shows no change in lung consolidatio vs mass. +klebsiella pneum bacteremia 9/3 repeat BC 9/5 neg. HIV negative. Pat last day for antibx 9/15. IR refused bx . S/P bronch 9/13.. no bx done and EBUS was not attempted due to coagulopathy.      Suggestion;  -continue antibx until 9/15  -f/u all bronch studies  -repeat CT chest none contrast 4-6 weeks  -continue combivent inhaler 2 puff q6h upon discharge  - continue nicotine patch  -advice to stop smoking   -out pat pul f/u  -repeat coag and if still abnormal , will need heme eeval    c/d dr alex

## 2019-09-15 LAB
ANION GAP SERPL CALC-SCNC: 2 MMOL/L — LOW (ref 5–17)
BUN SERPL-MCNC: 5 MG/DL — LOW (ref 7–18)
CALCIUM SERPL-MCNC: 8.4 MG/DL — SIGNIFICANT CHANGE UP (ref 8.4–10.5)
CHLORIDE SERPL-SCNC: 105 MMOL/L — SIGNIFICANT CHANGE UP (ref 96–108)
CO2 SERPL-SCNC: 29 MMOL/L — SIGNIFICANT CHANGE UP (ref 22–31)
CREAT SERPL-MCNC: 0.72 MG/DL — SIGNIFICANT CHANGE UP (ref 0.5–1.3)
CULTURE RESULTS: SIGNIFICANT CHANGE UP
GLUCOSE SERPL-MCNC: 70 MG/DL — SIGNIFICANT CHANGE UP (ref 70–99)
HCT VFR BLD CALC: 25.8 % — LOW (ref 34.5–45)
HGB BLD-MCNC: 7.9 G/DL — LOW (ref 11.5–15.5)
MCHC RBC-ENTMCNC: 30.6 GM/DL — LOW (ref 32–36)
MCHC RBC-ENTMCNC: 30.6 PG — SIGNIFICANT CHANGE UP (ref 27–34)
MCV RBC AUTO: 100 FL — SIGNIFICANT CHANGE UP (ref 80–100)
NRBC # BLD: 0 /100 WBCS — SIGNIFICANT CHANGE UP (ref 0–0)
PLATELET # BLD AUTO: 308 K/UL — SIGNIFICANT CHANGE UP (ref 150–400)
POTASSIUM SERPL-MCNC: 3.7 MMOL/L — SIGNIFICANT CHANGE UP (ref 3.5–5.3)
POTASSIUM SERPL-SCNC: 3.7 MMOL/L — SIGNIFICANT CHANGE UP (ref 3.5–5.3)
RBC # BLD: 2.58 M/UL — LOW (ref 3.8–5.2)
RBC # FLD: 14.2 % — SIGNIFICANT CHANGE UP (ref 10.3–14.5)
SODIUM SERPL-SCNC: 136 MMOL/L — SIGNIFICANT CHANGE UP (ref 135–145)
SPECIMEN SOURCE: SIGNIFICANT CHANGE UP
WBC # BLD: 11.36 K/UL — HIGH (ref 3.8–10.5)
WBC # FLD AUTO: 11.36 K/UL — HIGH (ref 3.8–10.5)

## 2019-09-15 RX ORDER — DOCUSATE SODIUM 100 MG
100 CAPSULE ORAL DAILY
Refills: 0 | Status: DISCONTINUED | OUTPATIENT
Start: 2019-09-15 | End: 2019-09-24

## 2019-09-15 RX ORDER — POLYETHYLENE GLYCOL 3350 17 G/17G
17 POWDER, FOR SOLUTION ORAL DAILY
Refills: 0 | Status: DISCONTINUED | OUTPATIENT
Start: 2019-09-15 | End: 2019-09-24

## 2019-09-15 RX ORDER — SENNA PLUS 8.6 MG/1
2 TABLET ORAL AT BEDTIME
Refills: 0 | Status: DISCONTINUED | OUTPATIENT
Start: 2019-09-15 | End: 2019-09-24

## 2019-09-15 RX ADMIN — Medication 10 MILLILITER(S): at 14:35

## 2019-09-15 RX ADMIN — TRAMADOL HYDROCHLORIDE 50 MILLIGRAM(S): 50 TABLET ORAL at 01:54

## 2019-09-15 RX ADMIN — METHADONE HYDROCHLORIDE 55 MILLIGRAM(S): 40 TABLET ORAL at 11:01

## 2019-09-15 RX ADMIN — PIPERACILLIN AND TAZOBACTAM 25 GRAM(S): 4; .5 INJECTION, POWDER, LYOPHILIZED, FOR SOLUTION INTRAVENOUS at 14:35

## 2019-09-15 RX ADMIN — PIPERACILLIN AND TAZOBACTAM 25 GRAM(S): 4; .5 INJECTION, POWDER, LYOPHILIZED, FOR SOLUTION INTRAVENOUS at 05:57

## 2019-09-15 RX ADMIN — Medication 650 MILLIGRAM(S): at 05:58

## 2019-09-15 RX ADMIN — Medication 1 PATCH: at 20:29

## 2019-09-15 RX ADMIN — Medication 650 MILLIGRAM(S): at 20:37

## 2019-09-15 RX ADMIN — Medication 1 PATCH: at 11:00

## 2019-09-15 RX ADMIN — Medication 650 MILLIGRAM(S): at 21:36

## 2019-09-15 RX ADMIN — HEPARIN SODIUM 5000 UNIT(S): 5000 INJECTION INTRAVENOUS; SUBCUTANEOUS at 14:35

## 2019-09-15 RX ADMIN — Medication 10 MILLILITER(S): at 21:44

## 2019-09-15 RX ADMIN — Medication 10 MILLILITER(S): at 05:57

## 2019-09-15 RX ADMIN — SENNA PLUS 2 TABLET(S): 8.6 TABLET ORAL at 21:44

## 2019-09-15 RX ADMIN — TRAMADOL HYDROCHLORIDE 50 MILLIGRAM(S): 50 TABLET ORAL at 10:53

## 2019-09-15 RX ADMIN — Medication 1000 UNIT(S): at 11:00

## 2019-09-15 RX ADMIN — PANTOPRAZOLE SODIUM 40 MILLIGRAM(S): 20 TABLET, DELAYED RELEASE ORAL at 05:59

## 2019-09-15 RX ADMIN — Medication 3 MILLILITER(S): at 20:25

## 2019-09-15 RX ADMIN — TRAMADOL HYDROCHLORIDE 50 MILLIGRAM(S): 50 TABLET ORAL at 09:56

## 2019-09-15 RX ADMIN — TRAMADOL HYDROCHLORIDE 50 MILLIGRAM(S): 50 TABLET ORAL at 18:57

## 2019-09-15 RX ADMIN — POLYETHYLENE GLYCOL 3350 17 GRAM(S): 17 POWDER, FOR SOLUTION ORAL at 18:43

## 2019-09-15 RX ADMIN — Medication 650 MILLIGRAM(S): at 06:45

## 2019-09-15 RX ADMIN — Medication 1 MILLIGRAM(S): at 11:00

## 2019-09-15 RX ADMIN — TRAMADOL HYDROCHLORIDE 50 MILLIGRAM(S): 50 TABLET ORAL at 17:58

## 2019-09-15 RX ADMIN — TRAMADOL HYDROCHLORIDE 50 MILLIGRAM(S): 50 TABLET ORAL at 02:30

## 2019-09-15 RX ADMIN — Medication 3 MILLILITER(S): at 14:35

## 2019-09-15 RX ADMIN — PIPERACILLIN AND TAZOBACTAM 25 GRAM(S): 4; .5 INJECTION, POWDER, LYOPHILIZED, FOR SOLUTION INTRAVENOUS at 21:44

## 2019-09-15 RX ADMIN — HEPARIN SODIUM 5000 UNIT(S): 5000 INJECTION INTRAVENOUS; SUBCUTANEOUS at 21:44

## 2019-09-15 RX ADMIN — Medication 3 MILLILITER(S): at 08:03

## 2019-09-15 RX ADMIN — LORATADINE 10 MILLIGRAM(S): 10 TABLET ORAL at 11:00

## 2019-09-15 RX ADMIN — Medication 1 PATCH: at 08:02

## 2019-09-15 NOTE — PROGRESS NOTE ADULT - SUBJECTIVE AND OBJECTIVE BOX
Patient is a 54y old  Female who presents with a chief complaint of SOB, Right sided CP, cough since yesterday morning (15 Sep 2019 04:56)    PATIENT IS SEEN AND EXAMINED IN MEDICAL FLOOR.    ALLERGIES:  No Known Allergies    VITALS:    Vital Signs Last 24 Hrs  T(C): 36.8 (15 Sep 2019 16:02), Max: 37.9 (15 Sep 2019 08:13)  T(F): 98.2 (15 Sep 2019 16:02), Max: 100.2 (15 Sep 2019 08:13)  HR: 86 (15 Sep 2019 16:02) (85 - 92)  BP: 85/32 (15 Sep 2019 16:02) (85/32 - 91/39)  BP(mean): --  RR: 18 (15 Sep 2019 16:02) (16 - 18)  SpO2: 92% (15 Sep 2019 16:02) (91% - 95%)    LABS:    CBC Full  -  ( 15 Sep 2019 06:12 )  WBC Count : 11.36 K/uL  RBC Count : 2.58 M/uL  Hemoglobin : 7.9 g/dL  Hematocrit : 25.8 %  Platelet Count - Automated : 308 K/uL  Mean Cell Volume : 100.0 fl  Mean Cell Hemoglobin : 30.6 pg  Mean Cell Hemoglobin Concentration : 30.6 gm/dL  Auto Neutrophil # : x  Auto Lymphocyte # : x  Auto Monocyte # : x  Auto Eosinophil # : x  Auto Basophil # : x  Auto Neutrophil % : x  Auto Lymphocyte % : x  Auto Monocyte % : x  Auto Eosinophil % : x  Auto Basophil % : x      09-15    136  |  105  |  5<L>  ----------------------------<  70  3.7   |  29  |  0.72    Ca    8.4      15 Sep 2019 06:12      Creatinine Trend: 0.72<--, 0.63<--, 0.58<--, 0.61<--, 0.57<--, 0.67<--  I&O's Summary        BAL  09-14 @ 00:30   Normal Respiratory Tali present  --    Few polymorphonuclear leukocytes per low power field  Rare Squamous epithelial cells per low power field  No organisms seen per oil power field      .Blood  09-05 @ 11:46   No growth at 5 days.  --  --      .Urine  09-04 @ 01:22   No growth  --  --      .Blood  09-03 @ 01:11   Growth in aerobic and anaerobic bottles: Klebsiella pneumoniae  "Due to technical problems, Proteus sp. will Not be reported as part of  the BCID panel until further notice"  ***Blood Panel PCR results on this specimen are available  approximately 3 hours after the Gram stain result.***  Gram stain, PCR, and/or culture results may not always  correspond due to difference in methodologies.  ************************************************************  This PCR assay was performed using [employer].  The following targets are tested for: Enterococcus,  vancomycin resistant enterococci, Listeria monocytogenes,  coagulase negative staphylococci, S. aureus,  methicillin resistant S. aureus, Streptococcus agalactiae  (Group B), S. pneumoniae, S. pyogenes (Group A),  Acinetobacter baumannii, Enterobacter cloacae, E. coli,  Klebsiella oxytoca, K. pneumoniae, Proteus sp.,  Serratia marcescens, Haemophilus influenzae,  Neisseria meningitidis, Pseudomonas aeruginosa, Candida  albicans, C. glabrata, C krusei, C parapsilosis,  C. tropicalis and the KPC resistance gene.  --  Blood Culture PCR  Klebsiella pneumoniae          MEDICATIONS:    MEDICATIONS  (STANDING):  ALBUTerol/ipratropium for Nebulization 3 milliLiter(s) Nebulizer every 6 hours  cholecalciferol 1000 Unit(s) Oral daily  docusate sodium 100 milliGRAM(s) Oral daily  folic acid 1 milliGRAM(s) Oral daily  guaiFENesin/dextromethorphan  Syrup 10 milliLiter(s) Oral three times a day  heparin  Injectable 5000 Unit(s) SubCutaneous every 8 hours  influenza   Vaccine 0.5 milliLiter(s) IntraMuscular once  loratadine 10 milliGRAM(s) Oral daily  nicotine - 21 mG/24Hr(s) Patch 1 patch Transdermal daily  pantoprazole    Tablet 40 milliGRAM(s) Oral before breakfast  piperacillin/tazobactam IVPB. 3.375 Gram(s) IV Intermittent once  piperacillin/tazobactam IVPB.. 3.375 Gram(s) IV Intermittent every 8 hours  senna 2 Tablet(s) Oral at bedtime      MEDICATIONS  (PRN):  acetaminophen   Tablet .. 650 milliGRAM(s) Oral every 6 hours PRN Temp greater or equal to 38C (100.4F), Mild Pain (1 - 3)  ALBUTerol/ipratropium for Nebulization 3 milliLiter(s) Nebulizer every 8 hours PRN Bronchospasm  aluminum hydroxide/magnesium hydroxide/simethicone Suspension 30 milliLiter(s) Oral every 6 hours PRN Dyspepsia  polyethylene glycol 3350 17 Gram(s) Oral daily PRN Constipation  traMADol 50 milliGRAM(s) Oral every 8 hours PRN Severe Pain (7 - 10)      REVIEW OF SYSTEMS:                           ALL ROS DONE [ X   ]    CONSTITUTIONAL:  LETHARGIC [   ], FEVER [   ], UNRESPONSIVE [   ]  CVS:  CP  [   ], SOB, [   ], PALPITATIONS [   ], DIZZYNESS [   ]  RS: COUGH [   ], SPUTUM [   ]  GI: ABDOMINAL PAIN [   ], NAUSEA [   ], VOMITINGS [   ], DIARRHEA [   ], CONSTIPATION [   ]  :  DYSURIA [   ], NOCTURIA [   ], INCREASED FREQUENCY [   ], DRIBLING [   ],  SKELETAL: PAINFUL JOINTS [   ], SWOLLEN JOINTS [   ], NECK ACHE [   ], LOW BACK ACHE [   ],  SKIN : ULCERS [   ], RASH [   ], ITCHING [   ]  CNS: HEAD ACHE [   ], DOUBLE VISION [   ], BLURRED VISION [   ], AMS / CONFUSION [   ], SEIZURES [   ], WEAKNESS [   ],TINGLING / NUMBNESS [   ]       PHYSICAL EXAMINATION:  GENERAL APPEARANCE: NO DISTRESS  HEENT:  NO PALLOR, NO  JVD,  NO   NODES, NECK SUPPLE  CVS: S1 +, S2 +,   RS: AEEB,  OCCASIONAL  RALES +,   NO RONCHI  ABD: SOFT, NT, NO, BS +  EXT: NO PE  SKIN: WARM,   SKELETAL:  ROM ACCEPTABLE  CNS:  AAO X 3   , NO  DEFICITS    RADIOLOGY :  < from: CT Chest No Cont (09.02.19 @ 12:33) >  IMPRESSION: Soft tissue impaction of the right middle and lower lobe   bronchi. Extensive airspace consolidations ofthe right middle and lower   lobes. Overall findings may reflect severe multifocal pneumonia, however   close follow-up to in short resolution and exclude endobronchial mass.   Mild mediastinal adenopathy.    < end of copied text >      ASSESSMENT :     Lobar pneumonia  Asthma      PLAN:  HPI:  55 yo female with PMH of Asthma, presented with SOB, cough and right sided chest pain since yesterday morning. She started feeling short of breath, present at rest, not relieved with Albuterol inhaler, unable to ambulate due to SOB since yesterday. It was associated with wheeze and cough with whitish sputum. She also complains of subjective fever. Chest pain is present on right lower side, 7/10 in severity, on/off, non radiating, stabbing in nature, worse with sitting and moving and lying on that side, relieved with Motrin.     Off note, she quit Heroin abuse on August 3rd and since then has been on Methadone 55mg OD. She goes to the Methadone clinic every day, 6 days a week for her Methadone dose. Clinic #: [phone]-Ext: [phone]. She has taken her dose for today. Primary team to call Clinic to confirm dose. Clinic was closed today. (02 Sep 2019 13:00)    - S/P BRONCHOSCOPY , PROLONGED INR / PTT & BX COULD NOT BE DONE, HAD BAL. PATIENT IS EXPLAINED TO F/UP DR. DOZIER, PULMONARY CONSULTANT AS OUT PATIENT IN FEW WEEKS AFTER COMPLETING ANTIBIOTICS & RPT CHEST CT & LUNG BX IF NEEDED AS OUT PATIENT IN FEW WEEKS, TO EVALUATE FOR MALIGNANCY. AND ALSO TO F/UP DR. HERNANDEZ , ONCOLOGIST AS OUT PATIENT  -  DC PLAN / TRANSFER TO IN PATIENT REHAB  - MULTIFOCAL PNEUMONIA, SUSPECT ASPIRATION PNEUMONIA, BACTEREMIA  ON IV ZOSYN - TO COMPLETE TOTAL 2 WEEKS   - CHEST PAIN, PLEURITIC  ON TRAMADOL, TYLENOL  - PULMONARY F/UP IS IN PROGRESS. MAY NEED BRONCHOSCOPY  - SUBSTANCE ABUSE , SW TO COORDINATE WITH PATIENT FOR OUT PATIENT REHAB   - GI AND DVT PROPHYLAXIS  - DR. TOVAR

## 2019-09-15 NOTE — PROGRESS NOTE ADULT - SUBJECTIVE AND OBJECTIVE BOX
pt seen and examined, no chest pain or sob on exam           acetaminophen   Tablet .. 650 milliGRAM(s) Oral every 6 hours PRN  ALBUTerol/ipratropium for Nebulization 3 milliLiter(s) Nebulizer every 8 hours PRN  ALBUTerol/ipratropium for Nebulization 3 milliLiter(s) Nebulizer every 6 hours  aluminum hydroxide/magnesium hydroxide/simethicone Suspension 30 milliLiter(s) Oral every 6 hours PRN  cholecalciferol 1000 Unit(s) Oral daily  dextrose 5% + sodium chloride 0.9%. 1000 milliLiter(s) IV Continuous <Continuous>  folic acid 1 milliGRAM(s) Oral daily  guaiFENesin/dextromethorphan  Syrup 10 milliLiter(s) Oral three times a day  heparin  Injectable 5000 Unit(s) SubCutaneous every 8 hours  influenza   Vaccine 0.5 milliLiter(s) IntraMuscular once  loratadine 10 milliGRAM(s) Oral daily  methadone    Tablet 55 milliGRAM(s) Oral daily  nicotine - 21 mG/24Hr(s) Patch 1 patch Transdermal daily  pantoprazole    Tablet 40 milliGRAM(s) Oral before breakfast  piperacillin/tazobactam IVPB. 3.375 Gram(s) IV Intermittent once  piperacillin/tazobactam IVPB.. 3.375 Gram(s) IV Intermittent every 8 hours  traMADol 50 milliGRAM(s) Oral every 8 hours PRN          Hemoglobin: 8.3 g/dL (09-12 @ 11:19)  Hemoglobin: 7.5 g/dL (09-11 @ 06:26)  Hemoglobin: 9.2 g/dL (09-10 @ 06:53)            Creatinine Trend: 0.63<--, 0.58<--, 0.61<--, 0.57<--, 0.67<--, 0.82<--    COAGS:           T(C): 37.2 (09-14-19 @ 23:35), Max: 37.2 (09-14-19 @ 23:35)  HR: 85 (09-14-19 @ 23:35) (55 - 86)  BP: 91/39 (09-14-19 @ 23:35) (86/40 - 100/52)  RR: 17 (09-14-19 @ 23:35) (16 - 17)  SpO2: 95% (09-14-19 @ 23:35) (95% - 97%)  Wt(kg): --    I&O's Summary    13 Sep 2019 07:01  -  14 Sep 2019 07:00  --------------------------------------------------------  IN: 600 mL / OUT: 0 mL / NET: 600 mL      Gen: Appears well in NAD  HEENT:  (-)icterus (-)pallor  CV: N S1 S2 1/6 ERYN (+)2 Pulses B/l  Resp:  Clear to ausculatation B/L, normal effort  GI: (+) BS Soft, NT, ND  Lymph:  (-)Edema, (-)obvious lymphadenopathy  Skin: Warm to touch, Normal turgor  Psych: Appropriate mood and affect      TELEMETRY: 	  OFF    ECHO : < from: Transthoracic Echocardiogram (09.03.19 @ 07:20) >  CONCLUSIONS:  1. Normal mitral valve. Mild mitral regurgitation.  2. Normal trileaflet aortic valve.  3. Aortic Root: 3.2 cm.    4. Normal left atrium.  LA volume index = 23 cc/m2.  5. Normal left ventricular internal dimensions and wall  thicknesses.  6. Normal Left Ventricular Systolic Function,  (EF = 55 to  60%)  7. Grade II diastolic dysfunction.  8. Normal right atrium.  9. Normal right ventricular size and systolic function  (TAPSE  2.5cm).  10. RV systolic pressure is normal at  29 mm Hg.  11. There is mild tricuspid regurgitation.  12. Normal pulmonic valve.  13. Normal pericardium with no pericardial effusion.    < end of copied text >      ASSESSMENT/PLAN: 	54y  Female PMH of Asthma, presented with SOB, cough and right sided chest pain since yesterday morning. She started feeling short of breath, present at rest, not relieved with Albuterol inhaler, unable to ambulate due to SOB since yesterday. It was associated with wheeze and cough with whitish sputum found with right lower lobe consolidation and klebsiella bacteremia. aborted Bx due elevated ptt.      - Echo with normal LV fx ,  Stress test with no ischemia, cardiac markers neg   - ECG with no ischemic changes   - Abx per ID   - No need for further inpatient cardiac work up.  - Pulmonary f/u  - d/c planning per medicine   D/W Dr Reid

## 2019-09-15 NOTE — CHART NOTE - NSCHARTNOTEFT_GEN_A_CORE
EVENT:  Patient complains of constipation.    SUBJECTIVE:    OBJECTIVE:  Vital Signs Last 24 Hrs  T(C): 37.9 (15 Sep 2019 08:13), Max: 37.9 (15 Sep 2019 08:13)  T(F): 100.2 (15 Sep 2019 08:13), Max: 100.2 (15 Sep 2019 08:13)  HR: 92 (15 Sep 2019 08:13) (85 - 92)  BP: 91/39 (15 Sep 2019 08:13) (91/39 - 100/52)  BP(mean): --  RR: 16 (15 Sep 2019 08:13) (16 - 17)  SpO2: 91% (15 Sep 2019 08:13) (91% - 97%)    LABS:                        7.9    11.36 )-----------( 308      ( 15 Sep 2019 06:12 )             25.8     09-15    136  |  105  |  5<L>  ----------------------------<  70  3.7   |  29  |  0.72    Ca    8.4      15 Sep 2019 06:12        EKG:   IMGAGING:    ASSESSMENT:  HPI:  55 yo female with PMH of Asthma, presented with SOB, cough and right sided chest pain since yesterday morning. She started feeling short of breath, present at rest, not relieved with Albuterol inhaler, unable to ambulate due to SOB since yesterday. It was associated with wheeze and cough with whitish sputum. She also complains of subjective fever. Chest pain is present on right lower side, 7/10 in severity, on/off, non radiating, stabbing in nature, worse with sitting and moving and lying on that side, relieved with Motrin.     Off note, she quit Heroin abuse on August 3rd and since then has been on Methadone 55mg OD. She goes to the Methadone clinic every day, 6 days a week for her Methadone dose. Clinic #: [phone]-Ext: [phone]. She has taken her dose for today. Primary team to call Clinic to confirm dose. Clinic was closed today. (02 Sep 2019 13:00)      PLAN:     Patient with diet complains of constipation.  Start colace daily, senna @ HS and miralax PRN.

## 2019-09-16 ENCOUNTER — TRANSCRIPTION ENCOUNTER (OUTPATIENT)
Age: 54
End: 2019-09-16

## 2019-09-16 DIAGNOSIS — D50.0 IRON DEFICIENCY ANEMIA SECONDARY TO BLOOD LOSS (CHRONIC): ICD-10-CM

## 2019-09-16 DIAGNOSIS — A41.9 SEPSIS, UNSPECIFIED ORGANISM: ICD-10-CM

## 2019-09-16 DIAGNOSIS — F11.20 OPIOID DEPENDENCE, UNCOMPLICATED: ICD-10-CM

## 2019-09-16 LAB
ALBUMIN SERPL ELPH-MCNC: 1.4 G/DL — LOW (ref 3.5–5)
ALP SERPL-CCNC: 126 U/L — HIGH (ref 40–120)
ALT FLD-CCNC: 19 U/L DA — SIGNIFICANT CHANGE UP (ref 10–60)
ANION GAP SERPL CALC-SCNC: 4 MMOL/L — LOW (ref 5–17)
APTT BLD: 55.4 SEC — HIGH (ref 27.5–36.3)
AST SERPL-CCNC: 20 U/L — SIGNIFICANT CHANGE UP (ref 10–40)
BILIRUB DIRECT SERPL-MCNC: 0.1 MG/DL — SIGNIFICANT CHANGE UP (ref 0–0.2)
BILIRUB INDIRECT FLD-MCNC: 0.3 MG/DL — SIGNIFICANT CHANGE UP (ref 0.2–1)
BILIRUB SERPL-MCNC: 0.4 MG/DL — SIGNIFICANT CHANGE UP (ref 0.2–1.2)
BLD GP AB SCN SERPL QL: SIGNIFICANT CHANGE UP
BUN SERPL-MCNC: 6 MG/DL — LOW (ref 7–18)
CALCIUM SERPL-MCNC: 8.4 MG/DL — SIGNIFICANT CHANGE UP (ref 8.4–10.5)
CHLORIDE SERPL-SCNC: 104 MMOL/L — SIGNIFICANT CHANGE UP (ref 96–108)
CO2 SERPL-SCNC: 32 MMOL/L — HIGH (ref 22–31)
CREAT SERPL-MCNC: 0.65 MG/DL — SIGNIFICANT CHANGE UP (ref 0.5–1.3)
GLUCOSE SERPL-MCNC: 79 MG/DL — SIGNIFICANT CHANGE UP (ref 70–99)
HCT VFR BLD CALC: 23.4 % — LOW (ref 34.5–45)
HGB BLD-MCNC: 7.2 G/DL — LOW (ref 11.5–15.5)
INR BLD: 1.49 RATIO — HIGH (ref 0.88–1.16)
MCHC RBC-ENTMCNC: 30.4 PG — SIGNIFICANT CHANGE UP (ref 27–34)
MCHC RBC-ENTMCNC: 30.8 GM/DL — LOW (ref 32–36)
MCV RBC AUTO: 98.7 FL — SIGNIFICANT CHANGE UP (ref 80–100)
NRBC # BLD: 0 /100 WBCS — SIGNIFICANT CHANGE UP (ref 0–0)
PLATELET # BLD AUTO: 335 K/UL — SIGNIFICANT CHANGE UP (ref 150–400)
POTASSIUM SERPL-MCNC: 3.2 MMOL/L — LOW (ref 3.5–5.3)
POTASSIUM SERPL-SCNC: 3.2 MMOL/L — LOW (ref 3.5–5.3)
PROT SERPL-MCNC: 6.4 G/DL — SIGNIFICANT CHANGE UP (ref 6–8.3)
PROTHROM AB SERPL-ACNC: 16.7 SEC — HIGH (ref 10–12.9)
RBC # BLD: 2.37 M/UL — LOW (ref 3.8–5.2)
RBC # FLD: 14.2 % — SIGNIFICANT CHANGE UP (ref 10.3–14.5)
SODIUM SERPL-SCNC: 140 MMOL/L — SIGNIFICANT CHANGE UP (ref 135–145)
WBC # BLD: 9.33 K/UL — SIGNIFICANT CHANGE UP (ref 3.8–10.5)
WBC # FLD AUTO: 9.33 K/UL — SIGNIFICANT CHANGE UP (ref 3.8–10.5)

## 2019-09-16 PROCEDURE — 76700 US EXAM ABDOM COMPLETE: CPT | Mod: 26

## 2019-09-16 RX ORDER — GUAIFENESIN/DEXTROMETHORPHAN 600MG-30MG
10 TABLET, EXTENDED RELEASE 12 HR ORAL ONCE
Refills: 0 | Status: COMPLETED | OUTPATIENT
Start: 2019-09-16 | End: 2019-09-16

## 2019-09-16 RX ORDER — POTASSIUM CHLORIDE 20 MEQ
40 PACKET (EA) ORAL EVERY 4 HOURS
Refills: 0 | Status: COMPLETED | OUTPATIENT
Start: 2019-09-16 | End: 2019-09-16

## 2019-09-16 RX ORDER — METHADONE HYDROCHLORIDE 40 MG/1
55 TABLET ORAL DAILY
Refills: 0 | Status: DISCONTINUED | OUTPATIENT
Start: 2019-09-16 | End: 2019-09-23

## 2019-09-16 RX ADMIN — TRAMADOL HYDROCHLORIDE 50 MILLIGRAM(S): 50 TABLET ORAL at 10:17

## 2019-09-16 RX ADMIN — HEPARIN SODIUM 5000 UNIT(S): 5000 INJECTION INTRAVENOUS; SUBCUTANEOUS at 05:29

## 2019-09-16 RX ADMIN — TRAMADOL HYDROCHLORIDE 50 MILLIGRAM(S): 50 TABLET ORAL at 18:18

## 2019-09-16 RX ADMIN — PIPERACILLIN AND TAZOBACTAM 25 GRAM(S): 4; .5 INJECTION, POWDER, LYOPHILIZED, FOR SOLUTION INTRAVENOUS at 05:30

## 2019-09-16 RX ADMIN — TRAMADOL HYDROCHLORIDE 50 MILLIGRAM(S): 50 TABLET ORAL at 11:15

## 2019-09-16 RX ADMIN — Medication 1 PATCH: at 07:31

## 2019-09-16 RX ADMIN — Medication 1000 UNIT(S): at 11:23

## 2019-09-16 RX ADMIN — Medication 3 MILLILITER(S): at 15:21

## 2019-09-16 RX ADMIN — PIPERACILLIN AND TAZOBACTAM 25 GRAM(S): 4; .5 INJECTION, POWDER, LYOPHILIZED, FOR SOLUTION INTRAVENOUS at 16:24

## 2019-09-16 RX ADMIN — Medication 10 MILLILITER(S): at 05:29

## 2019-09-16 RX ADMIN — Medication 10 MILLILITER(S): at 16:23

## 2019-09-16 RX ADMIN — POLYETHYLENE GLYCOL 3350 17 GRAM(S): 17 POWDER, FOR SOLUTION ORAL at 10:17

## 2019-09-16 RX ADMIN — PANTOPRAZOLE SODIUM 40 MILLIGRAM(S): 20 TABLET, DELAYED RELEASE ORAL at 05:29

## 2019-09-16 RX ADMIN — TRAMADOL HYDROCHLORIDE 50 MILLIGRAM(S): 50 TABLET ORAL at 02:17

## 2019-09-16 RX ADMIN — Medication 3 MILLILITER(S): at 10:17

## 2019-09-16 RX ADMIN — TRAMADOL HYDROCHLORIDE 50 MILLIGRAM(S): 50 TABLET ORAL at 19:04

## 2019-09-16 RX ADMIN — Medication 1 PATCH: at 11:22

## 2019-09-16 RX ADMIN — SENNA PLUS 2 TABLET(S): 8.6 TABLET ORAL at 21:36

## 2019-09-16 RX ADMIN — METHADONE HYDROCHLORIDE 55 MILLIGRAM(S): 40 TABLET ORAL at 11:23

## 2019-09-16 RX ADMIN — Medication 1 MILLIGRAM(S): at 11:23

## 2019-09-16 RX ADMIN — TRAMADOL HYDROCHLORIDE 50 MILLIGRAM(S): 50 TABLET ORAL at 02:57

## 2019-09-16 RX ADMIN — Medication 10 MILLILITER(S): at 11:29

## 2019-09-16 RX ADMIN — Medication 1 PATCH: at 20:24

## 2019-09-16 RX ADMIN — Medication 1 PATCH: at 11:23

## 2019-09-16 RX ADMIN — LORATADINE 10 MILLIGRAM(S): 10 TABLET ORAL at 11:22

## 2019-09-16 RX ADMIN — Medication 10 MILLILITER(S): at 23:09

## 2019-09-16 RX ADMIN — Medication 40 MILLIEQUIVALENT(S): at 16:23

## 2019-09-16 RX ADMIN — Medication 3 MILLILITER(S): at 20:27

## 2019-09-16 RX ADMIN — Medication 40 MILLIEQUIVALENT(S): at 10:19

## 2019-09-16 RX ADMIN — Medication 100 MILLIGRAM(S): at 11:23

## 2019-09-16 NOTE — CONSULT NOTE ADULT - SUBJECTIVE AND OBJECTIVE BOX
Patient is a 54y old  Female who presents with a chief complaint of SOB, Right sided CP, cough since yesterday morning (16 Sep 2019 15:54)      HPI:  55 yo female with PMH of Asthma, presented with SOB, cough and right sided chest pain since yesterday morning. She started feeling short of breath, present at rest, not relieved with Albuterol inhaler, unable to ambulate due to SOB since yesterday. It was associated with wheeze and cough with whitish sputum. She also complains of subjective fever. Chest pain is present on right lower side, 7/10 in severity, on/off, non radiating, stabbing in nature, worse with sitting and moving and lying on that side, relieved with Motrin. BC grew klebsiella.  CT showed dense consoldation at right lung.  Her Hb dropped from 11 to 7.2.    Off note, she quit Heroin abuse on August 3rd and since then has been on Methadone 55mg OD. She goes to the Methadone clinic every day, 6 days a week for her Methadone dose. Clinic #: [phone]-Ext: [phone]. She has taken her dose for today. Primary team to call Clinic to confirm dose. Clinic was closed today. (02 Sep 2019 13:00)       ROS:  Negative except for:    PAST MEDICAL & SURGICAL HISTORY:  Asthma  No significant past surgical history      SOCIAL HISTORY:    FAMILY HISTORY:  No pertinent family history in first degree relatives      MEDICATIONS  (STANDING):  ALBUTerol/ipratropium for Nebulization 3 milliLiter(s) Nebulizer every 6 hours  cholecalciferol 1000 Unit(s) Oral daily  docusate sodium 100 milliGRAM(s) Oral daily  folic acid 1 milliGRAM(s) Oral daily  guaiFENesin/dextromethorphan  Syrup 10 milliLiter(s) Oral three times a day  influenza   Vaccine 0.5 milliLiter(s) IntraMuscular once  loratadine 10 milliGRAM(s) Oral daily  methadone    Tablet 55 milliGRAM(s) Oral daily  nicotine - 21 mG/24Hr(s) Patch 1 patch Transdermal daily  pantoprazole    Tablet 40 milliGRAM(s) Oral before breakfast  piperacillin/tazobactam IVPB. 3.375 Gram(s) IV Intermittent once  piperacillin/tazobactam IVPB.. 3.375 Gram(s) IV Intermittent every 8 hours  senna 2 Tablet(s) Oral at bedtime    MEDICATIONS  (PRN):  acetaminophen   Tablet .. 650 milliGRAM(s) Oral every 6 hours PRN Temp greater or equal to 38C (100.4F), Mild Pain (1 - 3)  ALBUTerol/ipratropium for Nebulization 3 milliLiter(s) Nebulizer every 8 hours PRN Bronchospasm  aluminum hydroxide/magnesium hydroxide/simethicone Suspension 30 milliLiter(s) Oral every 6 hours PRN Dyspepsia  polyethylene glycol 3350 17 Gram(s) Oral daily PRN Constipation  traMADol 50 milliGRAM(s) Oral every 8 hours PRN Severe Pain (7 - 10)      Allergies    No Known Allergies    Intolerances        Vital Signs Last 24 Hrs  T(C): 37 (16 Sep 2019 22:02), Max: 37.8 (16 Sep 2019 07:54)  T(F): 98.6 (16 Sep 2019 22:02), Max: 100.1 (16 Sep 2019 07:54)  HR: 94 (16 Sep 2019 22:02) (66 - 94)  BP: 86/45 (16 Sep 2019 22:02) (82/37 - 100/50)  BP(mean): --  RR: 18 (16 Sep 2019 22:02) (16 - 18)  SpO2: 94% (16 Sep 2019 22:02) (94% - 100%)    PHYSICAL EXAM  General: adult in NAD  HEENT: clear oropharynx, anicteric sclera, pink conjunctiva  Neck: supple  CV: normal S1/S2 with no murmur rubs or gallops  Lungs: positive air movement b/l ant lungs,clear to auscultation, no wheezes, no rales  Abdomen: soft non-tender non-distended, no hepatosplenomegaly  Ext: no clubbing cyanosis or edema  Skin: no rashes and no petechiae  Neuro: alert and oriented X 4, no focal deficits      LABS:                          7.2    9.33  )-----------( 335      ( 16 Sep 2019 06:54 )             23.4         Mean Cell Volume : 98.7 fl  Mean Cell Hemoglobin : 30.4 pg  Mean Cell Hemoglobin Concentration : 30.8 gm/dL  Auto Neutrophil # : x  Auto Lymphocyte # : x  Auto Monocyte # : x  Auto Eosinophil # : x  Auto Basophil # : x  Auto Neutrophil % : x  Auto Lymphocyte % : x  Auto Monocyte % : x  Auto Eosinophil % : x  Auto Basophil % : x      Serial CBC's  09-16 @ 06:54  Hct-23.4 / Hgb-7.2 / Plat-335 / RBC-2.37 / WBC-9.33  Serial CBC's  09-15 @ 06:12  Hct-25.8 / Hgb-7.9 / Plat-308 / RBC-2.58 / WBC-11.36      09-16    140  |  104  |  6<L>  ----------------------------<  79  3.2<L>   |  32<H>  |  0.65    Ca    8.4      16 Sep 2019 06:54    TPro  6.4  /  Alb  1.4<L>  /  TBili  0.4  /  DBili  0.1  /  AST  20  /  ALT  19  /  AlkPhos  126<H>  09-16      PT/INR - ( 16 Sep 2019 09:54 )   PT: 16.7 sec;   INR: 1.49 ratio         PTT - ( 16 Sep 2019 09:54 )  PTT:55.4 sec                BLOOD SMEAR INTERPRETATION:       RADIOLOGY & ADDITIONAL STUDIES:  < from: CT Chest No Cont (09.10.19 @ 09:26) >  INTERPRETATION:  CLINICAL INDICATION: 54 years  Female with TO EVALUATE   RIGHT LUNG LESIONS  AS ABOVE. Pneumonia.    COMPARISON: 9/2/2019    PROCEDURE:   CT of the Chest was performed without intravenous contrast.  Sagittal and coronal reformats were performed.      FINDINGS:    LUNGS AND AIRWAYS: Persistent dense consolidation involving most of the   right middle lobe extending into the right lower lobe. Right lower lobe   air bronchograms present. Diffuse thickening of the bronchovascular   interstitium within the right lung. Right lower lobe dependent   atelectasis. Mild discoid atelectasis in the left lower lobe. Mild   bilateral upper lobe emphysema.    PLEURA: Trace right pleural effusion. No left pleural effusion.    MEDIASTINUM AND GÓMEZ: Right upper paratracheal lymph node measuring 7 mm   in short axis (3:37). Right upper paratracheal lymph node measuring 9 mm   in short axis (3:49). Right lower paratracheal lymph node measuring 1 cm   in short axis (3:58). Enlarged subcarinal lymph node measuring 2.2 cm in   short axis (3:71). Extension of right lung consolidation into the right   hilum. Limited evaluation for vascular invasion or constrictionsecondary   to lack of IV contrast. Complete opacification of the right middle lobe   bronchi.    VESSELS: No evidence of aortic aneurysm. Please see Steinmann hilar   findings.    HEART: Heart size is normal. Trace pericardial effusion.    CHEST WALL AND LOWER NECK: Within normal limits.    VISUALIZED UPPER ABDOMEN: Suspected splenomegaly. The spleen is   incompletely imaged. The adrenal glands are incompletely imaged.    BONES: Mild cervical and thoracic degenerative changes.    IMPRESSION:     < end of copied text >  < from: CT Chest No Cont (09.10.19 @ 09:26) >  Dense consolidation in the right middle and lower lobe with diffuse right   lung bronchovascular thickening as well as mediastinal adenopathy.   Complete opacification of the right middle lobe bronchi. Findings   concerning for underlying neoplasm.    Extension of right lung consolidation into the right hilum. Limited   evaluation for vascular invasion or constriction secondary to lack of IV   contrast.    Mild emphysema.    < end of copied text >

## 2019-09-16 NOTE — PROGRESS NOTE ADULT - SUBJECTIVE AND OBJECTIVE BOX
NP Note discussed with  Primary Attending    Patient is a 54y old  Female who presents with a chief complaint of SOB, Right sided CP, cough since yesterday morning (16 Sep 2019 13:57)    HPI : 53 yo female with PMH of Asthma, who  presented with SOB, cough and right sided chest pain x 1 day. She started feeling short of breath, present at rest, not relieved with Albuterol inhaler. CXR reveals opacification/consolidation of the RLL . Admitted to medicine for pneumonia   pt was started on Zosyn, last dose of zosyn will be given today, for total of 14 days.  CT chest showed right lung mass with concern for underlying neoplasm . Pt had bronchoscopy last Friday, unable to do biopsy due to prolonged PTT and denies SOB but still coughing, no blood per pt but her Hg dropped from 11 on admission to 7.2 today. IG and Heme/ onco called.  VSs, afebrile.       GOC  : full code       INTERVAL HPI/OVERNIGHT EVENTS: no new complaints    MEDICATIONS  (STANDING):  ALBUTerol/ipratropium for Nebulization 3 milliLiter(s) Nebulizer every 6 hours  cholecalciferol 1000 Unit(s) Oral daily  docusate sodium 100 milliGRAM(s) Oral daily  folic acid 1 milliGRAM(s) Oral daily  guaiFENesin/dextromethorphan  Syrup 10 milliLiter(s) Oral three times a day  influenza   Vaccine 0.5 milliLiter(s) IntraMuscular once  loratadine 10 milliGRAM(s) Oral daily  methadone    Tablet 55 milliGRAM(s) Oral daily  nicotine - 21 mG/24Hr(s) Patch 1 patch Transdermal da  pantoprazole    Tablet 40 milliGRAM(s) Oral before breakfast  piperacillin/tazobactam IVPB. 3.375 Gram(s) IV Intermittent once  piperacillin/tazobactam IVPB.. 3.375 Gram(s) IV Intermittent every 8 hours  potassium chloride    Tablet ER 40 milliEquivalent(s) Oral every 4 hours  senna 2 Tablet(s) Oral at bedtime    MEDICATIONS  (PRN):  acetaminophen   Tablet .. 650 milliGRAM(s) Oral every 6 hours PRN Temp greater or equal to 38C (100.4F), Mild Pain (1 - 3)  ALBUTerol/ipratropium for Nebulization 3 milliLiter(s) Nebulizer every 8 hours PRN Bronchospasm  aluminum hydroxide/magnesium hydroxide/simethicone Suspension 30 milliLiter(s) Oral every 6 hours PRN Dyspepsia  polyethylene glycol 3350 17 Gram(s) Oral daily PRN Constipation  traMADol 50 milliGRAM(s) Oral every 8 hours PRN Severe Pain (7 - 10)      __________________________________________________  REVIEW OF SYSTEMS:    CONSTITUTIONAL: No fever,   EYES: no acute visual disturbances  NECK: No pain or stiffness  RESPIRATORY: occasional  cough; No shortness of breath  CARDIOVASCULAR: No chest pain, no palpitations  GASTROINTESTINAL: No pain. No nausea or vomiting; No diarrhea   NEUROLOGICAL: No headache or numbness, no tremors  MUSCULOSKELETAL: No joint pain, no muscle pain  GENITOURINARY: no dysuria, no frequency, no hesitancy  PSYCHIATRY: no depression , no anxiety  ALL OTHER  ROS negative        Vital Signs Last 24 Hrs  T(C): 37.8 (16 Sep 2019 07:54), Max: 37.8 (16 Sep 2019 07:54)  T(F): 100.1 (16 Sep 2019 07:54), Max: 100.1 (16 Sep 2019 07:54)  HR: 91 (16 Sep 2019 07:54) (66 - 91)  BP: 100/50 (16 Sep 2019 07:54) (82/37 - 100/50)  BP(mean): --  RR: 16 (16 Sep 2019 07:54) (16 - 18)  SpO2: 94% (16 Sep 2019 07:54) (92% - 94%)    ________________________________________________  PHYSICAL EXAM:  GENERAL: NAD  HEENT: Normocephalic;  conjunctivae and sclerae clear; moist mucous membranes;   NECK : supple  CHEST/LUNG: Clear to auscultation bilaterally with good air entry,  no wheezing, rhonchi, diminished breathing sound to bases   HEART: S1 S2  regular; no murmurs, gallops or rubs  ABDOMEN: Soft, Nontender, Nondistended; Bowel sounds present  EXTREMITIES: no cyanosis; no edema; no calf tenderness  SKIN: warm and dry; no rash  NERVOUS SYSTEM:  Awake and alert; Oriented  to place, person and time ; no new deficits    _________________________________________________  LABS:                        7.2    9.33  )-----------( 335      ( 16 Sep 2019 06:54 )             23.4     09-16    140  |  104  |  6<L>  ----------------------------<  79  3.2<L>   |  32<H>  |  0.65    Ca    8.4      16 Sep 2019 06:54    TPro  6.4  /  Alb  1.4<L>  /  TBili  0.4  /  DBili  0.1  /  AST  20  /  ALT  19  /  AlkPhos  126<H>  09-16    PT/INR - ( 16 Sep 2019 09:54 )   PT: 16.7 sec;   INR: 1.49 ratio         PTT - ( 16 Sep 2019 09:54 )  PTT:55.4 sec    CAPILLARY BLOOD GLUCOSE            RADIOLOGY & ADDITIONAL TESTS:    Imaging Personally Reviewed:  YES/NO    Consultant(s) Notes Reviewed:   YES/ No    Care Discussed with Consultants :     Plan of care was discussed with patient and /or primary care giver; all questions and concerns were addressed and care was aligned with patient's wishes.

## 2019-09-16 NOTE — CONSULT NOTE ADULT - PROBLEM SELECTOR RECOMMENDATION 9
klebsiella, most likely from pneumonia  on antibiotics  fever is gone
Normocytic   No evidence of active Gi blood loss.   May have multiple etiologies including malignancy versus GI blood loss.   If patient is staying and she is cleared by pulmonary we can schedule for EGD and Colon for Wednesday to complete anemia workup. If not then these can be done as an outpatient.

## 2019-09-16 NOTE — DISCHARGE NOTE PROVIDER - NSDCCPCAREPLAN_GEN_ALL_CORE_FT
PRINCIPAL DISCHARGE DIAGNOSIS  Diagnosis: Pneumonia of right lower lobe due to infectious organism  Assessment and Plan of Treatment: You were admitted with cough and right sided chest pain.  You were found to have multi-focal pneumonia on chest CT.  You were followed by infectious disease doctor and with pulmonologist and were treated with intravenous antibiotics with resolution of pneumonia.  -Follow up pulmonologist Dr. Guo.....  -Repeat chest CT in 4 weeks        SECONDARY DISCHARGE DIAGNOSES  Diagnosis: Asthma  Assessment and Plan of Treatment: Use Combivent inhaler as prescribed.  Follow up with pulmonologist  Stop smoking    Diagnosis: Bence Poe protein  Assessment and Plan of Treatment: You were noted with abnormal protein in your blood.  Follow up with hematology Dr. Rosado on 10/3/19 at 12:15    Diagnosis: Methadone maintenance therapy patient  Assessment and Plan of Treatment: Continue follow up with Methadone clinic as prior to hospitalization    Diagnosis: Lung mass  Assessment and Plan of Treatment: Your CT scan showed right middle lobe broncus mass which was suspicious for malignancy.  You had further evaluation of the mass and underwent bronchoscopy 9/13 with cultur of bronchial wash which was negative for malignancy.  -Please follow up with repeat CT Chest no contrast in 4 weeks  -Please stop smoking  -Use Combivent inhaler every 6hours.  -Follow up with outpt. pulmonology and oncology, appointments mad for you.      Diagnosis: Bence Poe protein  Assessment and Plan of Treatment: Bence Poe protein PRINCIPAL DISCHARGE DIAGNOSIS  Diagnosis: Pneumonia of right lower lobe due to infectious organism  Assessment and Plan of Treatment: You were admitted with cough and right sided chest pain.  You were found to have multi-focal pneumonia on chest CT.  You were followed by infectious disease doctor and with pulmonologist and were treated with intravenous antibiotics with resolution of pneumonia.  -Follow up pulmonologist Dr. Guo.....  -Repeat chest CT in 4 weeks        SECONDARY DISCHARGE DIAGNOSES  Diagnosis: Asthma  Assessment and Plan of Treatment: Use Combivent inhaler as prescribed.  Follow up with pulmonologist  Stop smoking    Diagnosis: Bence Poe protein  Assessment and Plan of Treatment: You were noted with abnormal protein in your blood.  Follow up with hematology Dr. Rosado on 10/3/19 at 12:15    Diagnosis: Anemia  Assessment and Plan of Treatment: you were noted with anemia.  You underwent endoscopy and colonoscopy which did not reveal any gastrointestinal bleed.    Diagnosis: Methadone maintenance therapy patient  Assessment and Plan of Treatment: Continue follow up with Methadone clinic as prior to hospitalization    Diagnosis: Lung mass  Assessment and Plan of Treatment: Your CT scan showed right middle lobe broncus mass which was suspicious for malignancy.  You had further evaluation of the mass and underwent bronchoscopy 9/13 with cultur of bronchial wash which was negative for malignancy.  -Please follow up with repeat CT Chest no contrast in 4 weeks  -Please stop smoking  -Use Combivent inhaler every 6hours.  -Follow up with outpt. pulmonology and oncology, appointments mad for you.      Diagnosis: Bence Poe protein  Assessment and Plan of Treatment: Bence Poe protein PRINCIPAL DISCHARGE DIAGNOSIS  Diagnosis: Pneumonia of right lower lobe due to infectious organism  Assessment and Plan of Treatment: You were admitted with cough and right sided chest pain.  You were found to have multi-focal pneumonia on chest CT.  You were followed by infectious disease doctor and with pulmonologist and were treated with intravenous antibiotics with resolution of pneumonia.  -Follow up pulmonologist Dr. Guo on October 24 at 11:20AM   -Repeat chest CT in 4 weeks        SECONDARY DISCHARGE DIAGNOSES  Diagnosis: Coagulopathy  Assessment and Plan of Treatment: You have a condition where your blood does not clot normally. Please continue taking vitamin K tablets. Follow up [facility] hematology within one week to have your blood work (PT/PTT/INR) tested.    Diagnosis: Anemia  Assessment and Plan of Treatment: you were noted with anemia.  You underwent endoscopy and colonoscopy which did not reveal any gastrointestinal bleed.    Diagnosis: Asthma  Assessment and Plan of Treatment: Use Combivent inhaler as prescribed.  Follow up with pulmonologist  Stop smoking    Diagnosis: Bence Poe protein  Assessment and Plan of Treatment: You were noted with abnormal protein in your blood.  Follow up with hematology Dr. Rosado on 10/3/19 at 12:15    Diagnosis: Bence Poe protein  Assessment and Plan of Treatment: Bence Poe protein    Diagnosis: Methadone maintenance therapy patient  Assessment and Plan of Treatment: Continue follow up with Methadone clinic as prior to hospitalization    Diagnosis: Lung mass  Assessment and Plan of Treatment: Your CT scan showed right middle lobe broncus mass which was suspicious for malignancy.  You had further evaluation of the mass and underwent bronchoscopy 9/13 with cultur of bronchial wash which was negative for malignancy.  -Please follow up with repeat CT Chest no contrast in 4 weeks  -Please stop smoking  -Use Combivent inhaler every 6hours.  -Follow up with outpt. pulmonology and oncology, appointments mad for you.

## 2019-09-16 NOTE — PROGRESS NOTE ADULT - PROBLEM SELECTOR PLAN 6
Right side pain likely costo-chrondritis  due to pneumonia and lung mass  cardiology w/u negative ( EKG/ Echo / stress test and CE )   c/w pain regimen.

## 2019-09-16 NOTE — DISCHARGE NOTE PROVIDER - CARE PROVIDER_API CALL
Kelechi Rosado)  Internal Medicine; Medical Oncology  [address] [place] [address]  Phone: [phone]  Fax: [phone]  Follow Up Time: 1 week    Leah Guo)  Medicine  Dept Director  [address] [place] [address]  Phone: [phone]  Fax: [phone]  Follow Up Time: 1 week Kelechi Rosado)  Internal Medicine; Medical Oncology  [address] [place] [address]  Phone: [phone]  Fax: [phone]  Follow Up Time: 1 week    Leah Guo)  Medicine  Dept Director  [address] [place] [address]  Phone: [phone]  Fax: [phone]  Follow Up Time: 1 month

## 2019-09-16 NOTE — DISCHARGE NOTE PROVIDER - NSDCFUADDAPPT_GEN_ALL_CORE_FT
Dr. HERNANDEZ-October/3/2019-12:15 Dr. HERNANDEZ-October/3/2019-12:15  Dr. Guo - October 24, 2019 at 11:20 AM

## 2019-09-16 NOTE — CONSULT NOTE ADULT - PROBLEM SELECTOR RECOMMENDATION 2
dense consolidation, pneumonia vs mass  bronch was done  BAL was done  will follow cytology
Management as per Pulmo

## 2019-09-16 NOTE — DISCHARGE NOTE PROVIDER - PROVIDER TOKENS
PROVIDER:[TOKEN:[[id number]],FOLLOWUP:[1 week]],PROVIDER:[TOKEN:[[id number]],FOLLOWUP:[1 week]] PROVIDER:[TOKEN:[[id number]],FOLLOWUP:[1 week]],PROVIDER:[TOKEN:[[id number]],FOLLOWUP:[1 month]]

## 2019-09-16 NOTE — CONSULT NOTE ADULT - PROBLEM SELECTOR RECOMMENDATION 3
most likley due to sepsis  but can not r/o bleeding because sepsis can cause stress ulcer  will do FOBT  ferritin is high from sepsis but no sign of GERMAN  will give folate

## 2019-09-16 NOTE — PHARMACOTHERAPY INTERVENTION NOTE - COMMENTS
Pt is on Duoneb standing and duoneb prn for sob; recommended to change prn duoneb to albuterol mdi prn

## 2019-09-16 NOTE — CONSULT NOTE ADULT - ATTENDING COMMENTS
I agree with above
Thank you for your consultation and allowing  me to participate in the care of your patients. If you have further questions please contact me at [phone].     Kirt Wright M.D.

## 2019-09-16 NOTE — CONSULT NOTE ADULT - SUBJECTIVE AND OBJECTIVE BOX
Patient is a 54y old  Female who presents with a chief complaint of SOB, Right sided CP, cough since yesterday morning (16 Sep 2019 11:56)    Ms. Charlton is a 54 year old female with a past medical history of Heroin abuse Asthma presents to [facility] with complaints of SOB, productive cough, wheezing and productive sputum.  She is S.p BALD and bronch by pulmonary. Gi consulted for worsening anemia. She states she was told she has anemia before but does not remember the last workup she had.   REVIEW OF SYSTEMS  Constitutional:   No fever, no fatigue, no pallor, no night sweats, no weight loss.  HEENT:   No eye pain, no vision changes, no icterus, no mouth ulcers.  Respiratory:   + shortness of breath, + cough, no respiratory distress.   Cardiovascular:   No chest pain, no palpitations.   Gastrointestinal: No abdominal pain, no nausea, no vomiting , no diarrhea no constipation, no hematochezia, no melena.  Skin:   No rashes, no jaundice, no eczema.   Musculoskeletal:   No joint pain, no swelling, no myalgia.   Neurologic:   No headache, no seizure, no weakness.   Genitourinary:   No dysuria, no decreased urine output.  Psychiatric:  No depression, no anxiety,   Endocrine:   No thyroid disease, no diabetes.  Heme/Lymphatic:   No anemia, no blood transfusions, no lymph node enlargement, no bleeding, no bruising.  ___________________________________________________________________________________________  Allergies    No Known Allergies    Intolerances      MEDICATIONS  (STANDING):  ALBUTerol/ipratropium for Nebulization 3 milliLiter(s) Nebulizer every 6 hours  cholecalciferol 1000 Unit(s) Oral daily  docusate sodium 100 milliGRAM(s) Oral daily  folic acid 1 milliGRAM(s) Oral daily  guaiFENesin/dextromethorphan  Syrup 10 milliLiter(s) Oral three times a day  influenza   Vaccine 0.5 milliLiter(s) IntraMuscular once  loratadine 10 milliGRAM(s) Oral daily  methadone    Tablet 55 milliGRAM(s) Oral daily  nicotine - 21 mG/24Hr(s) Patch 1 patch Transdermal daily  pantoprazole    Tablet 40 milliGRAM(s) Oral before breakfast  piperacillin/tazobactam IVPB. 3.375 Gram(s) IV Intermittent once  piperacillin/tazobactam IVPB.. 3.375 Gram(s) IV Intermittent every 8 hours  potassium chloride    Tablet ER 40 milliEquivalent(s) Oral every 4 hours  senna 2 Tablet(s) Oral at bedtime    MEDICATIONS  (PRN):  acetaminophen   Tablet .. 650 milliGRAM(s) Oral every 6 hours PRN Temp greater or equal to 38C (100.4F), Mild Pain (1 - 3)  ALBUTerol/ipratropium for Nebulization 3 milliLiter(s) Nebulizer every 8 hours PRN Bronchospasm  aluminum hydroxide/magnesium hydroxide/simethicone Suspension 30 milliLiter(s) Oral every 6 hours PRN Dyspepsia  polyethylene glycol 3350 17 Gram(s) Oral daily PRN Constipation  traMADol 50 milliGRAM(s) Oral every 8 hours PRN Severe Pain (7 - 10)      PAST MEDICAL & SURGICAL HISTORY:  Asthma  No significant past surgical history    FAMILY HISTORY:  No pertinent family history in first degree relatives    Social History: No hsitory of : Tobacco use, IVDA, EToH  ______________________________________________________________________________________    PHYSICAL EXAM    Daily     Daily   BMI: 25.5 (09-02 @ 08:31)  Change in Weight:  Vital Signs Last 24 Hrs  T(C): 37.8 (16 Sep 2019 07:54), Max: 37.8 (16 Sep 2019 07:54)  T(F): 100.1 (16 Sep 2019 07:54), Max: 100.1 (16 Sep 2019 07:54)  HR: 91 (16 Sep 2019 07:54) (66 - 91)  BP: 100/50 (16 Sep 2019 07:54) (82/37 - 100/50)  BP(mean): --  RR: 16 (16 Sep 2019 07:54) (16 - 18)  SpO2: 94% (16 Sep 2019 07:54) (92% - 94%)    General:   NAD.  HEENT:    Normal appearance of conjunctiva, ears, nose, lips, oropharynx, and oral mucosa, anicteric.  Neck:  No masses, no asymmetry.  Lymph Nodes:  No lymphadenopathy.   Cardiovascular:  RRR normal S1/S2, no murmur.  Respiratory:  CTA B/L, normal respiratory effort.   Abdominal:   soft, no masses or tenderness, normoactive BS, NT/ND, no HSM.  Extremities:   No clubbing or cyanosis, normal capillary refill, no edema.   Skin:   No rash, jaundice, lesions, eczema.     _______________________________________________________________________________________________  Lab Results:                          7.2    9.33  )-----------( 335      ( 16 Sep 2019 06:54 )             23.4     09-16    140  |  104  |  6<L>  ----------------------------<  79  3.2<L>   |  32<H>  |  0.65    Ca    8.4      16 Sep 2019 06:54    TPro  6.4  /  Alb  1.4<L>  /  TBili  0.4  /  DBili  0.1  /  AST  20  /  ALT  19  /  AlkPhos  126<H>  09-16    LIVER FUNCTIONS - ( 16 Sep 2019 06:54 )  Alb: 1.4 g/dL / Pro: 6.4 g/dL / ALK PHOS: 126 U/L / ALT: 19 U/L DA / AST: 20 U/L / GGT: x           PT/INR - ( 16 Sep 2019 09:54 )   PT: 16.7 sec;   INR: 1.49 ratio         PTT - ( 16 Sep 2019 09:54 )  PTT:55.4 sec        Stool Results:          RADIOLOGY RESULTS:    SURGICAL PATHOLOGY:   < from: CT Chest No Cont (09.10.19 @ 09:26) >    EXAM:  CT CHEST                            PROCEDURE DATE:  09/10/2019          INTERPRETATION:  CLINICAL INDICATION: 54 years  Female with TO EVALUATE   RIGHT LUNG LESIONS  AS ABOVE. Pneumonia.    COMPARISON: 9/2/2019    PROCEDURE:   CT of the Chest was performed without intravenous contrast.  Sagittal and coronal reformats were performed.      FINDINGS:    LUNGS AND AIRWAYS: Persistent dense consolidation involving most of the   right middle lobe extending into the right lower lobe. Right lower lobe   air bronchograms present. Diffuse thickening of the bronchovascular   interstitium within the right lung. Right lower lobe dependent   atelectasis. Mild discoid atelectasis in the left lower lobe. Mild   bilateral upper lobe emphysema.    PLEURA: Trace right pleural effusion. No left pleural effusion.    MEDIASTINUM AND GÓMEZ: Right upper paratracheal lymph node measuring 7 mm   in short axis (3:37). Right upper paratracheal lymph node measuring 9 mm   in short axis (3:49). Right lower paratracheal lymph node measuring 1 cm   in short axis (3:58). Enlarged subcarinal lymph node measuring 2.2 cm in   short axis (3:71). Extension of right lung consolidation into the right   hilum. Limited evaluation for vascular invasion or constrictionsecondary   to lack of IV contrast. Complete opacification of the right middle lobe   bronchi.    VESSELS: No evidence of aortic aneurysm. Please see Steinmann hilar   findings.    HEART: Heart size is normal. Trace pericardial effusion.    CHEST WALL AND LOWER NECK: Within normal limits.    VISUALIZED UPPER ABDOMEN: Suspected splenomegaly. The spleen is   incompletely imaged. The adrenal glands are incompletely imaged.    BONES: Mild cervical and thoracic degenerative changes.    IMPRESSION:     Dense consolidation in the right middle and lower lobe with diffuse right   lung bronchovascular thickening as well as mediastinal adenopathy.   Complete opacification of the right middle lobe bronchi. Findings   concerning for underlying neoplasm.    Extension of right lung consolidation into the right hilum. Limited   evaluation for vascular invasion or constriction secondary to lack of IV   contrast.    Mild emphysema.                    SHIRA BELTRAN M.D., ATTENDING RADIOLOGIST  This document has been electronically signed. Sep 10 2019 10:01AM                < end of copied text >

## 2019-09-16 NOTE — PROGRESS NOTE ADULT - PROBLEM SELECTOR PLAN 7
hx of heroin abuse    current going to methadone clinic 6 times a week - c/w  methadone 55mg po daily.

## 2019-09-16 NOTE — DISCHARGE NOTE PROVIDER - NSDCFUADDINST_GEN_ALL_CORE_FT
Please make sure to follow up with Dr. Rosado oncologist for your lung mass and Dr. Guo pulmonologist for a repeat CT-scan in about 6 weeks to evaluate your right lung malignancy.

## 2019-09-16 NOTE — CONSULT NOTE ADULT - REASON FOR ADMISSION
SOB, Right sided CP, cough since yesterday morning

## 2019-09-16 NOTE — PROGRESS NOTE ADULT - PROBLEM SELECTOR PLAN 3
s/p bronchoscopy on 9.13.2019  no bx at that time  rec - CT chest without contrast 6-8 weeks --> Dr. Guo

## 2019-09-16 NOTE — PROGRESS NOTE ADULT - SUBJECTIVE AND OBJECTIVE BOX
Patient denies angina, SOB Review of systems otherwise (-)    acetaminophen   Tablet .. 650 milliGRAM(s) Oral every 6 hours PRN  ALBUTerol/ipratropium for Nebulization 3 milliLiter(s) Nebulizer every 8 hours PRN  ALBUTerol/ipratropium for Nebulization 3 milliLiter(s) Nebulizer every 6 hours  aluminum hydroxide/magnesium hydroxide/simethicone Suspension 30 milliLiter(s) Oral every 6 hours PRN  cholecalciferol 1000 Unit(s) Oral daily  docusate sodium 100 milliGRAM(s) Oral daily  folic acid 1 milliGRAM(s) Oral daily  guaiFENesin/dextromethorphan  Syrup 10 milliLiter(s) Oral three times a day  influenza   Vaccine 0.5 milliLiter(s) IntraMuscular once  loratadine 10 milliGRAM(s) Oral daily  methadone    Tablet 55 milliGRAM(s) Oral daily  nicotine - 21 mG/24Hr(s) Patch 1 patch Transdermal daily  pantoprazole    Tablet 40 milliGRAM(s) Oral before breakfast  piperacillin/tazobactam IVPB. 3.375 Gram(s) IV Intermittent once  piperacillin/tazobactam IVPB.. 3.375 Gram(s) IV Intermittent every 8 hours  polyethylene glycol 3350 17 Gram(s) Oral daily PRN  potassium chloride    Tablet ER 40 milliEquivalent(s) Oral every 4 hours  senna 2 Tablet(s) Oral at bedtime  traMADol 50 milliGRAM(s) Oral every 8 hours PRN                            7.2    9.33  )-----------( 335      ( 16 Sep 2019 06:54 )             23.4       Hemoglobin: 7.2 g/dL (09-16 @ 06:54)  Hemoglobin: 7.9 g/dL (09-15 @ 06:12)  Hemoglobin: 8.3 g/dL (09-12 @ 11:19)      09-16    140  |  104  |  6<L>  ----------------------------<  79  3.2<L>   |  32<H>  |  0.65    Ca    8.4      16 Sep 2019 06:54    TPro  6.4  /  Alb  1.4<L>  /  TBili  0.4  /  DBili  0.1  /  AST  20  /  ALT  19  /  AlkPhos  126<H>  09-16    Creatinine Trend: 0.65<--, 0.72<--, 0.63<--, 0.58<--, 0.61<--, 0.57<--    COAGS: PT/INR - ( 16 Sep 2019 09:54 )   PT: 16.7 sec;   INR: 1.49 ratio         PTT - ( 16 Sep 2019 09:54 )  PTT:55.4 sec          T(C): 37.8 (09-16-19 @ 07:54), Max: 37.8 (09-16-19 @ 07:54)  HR: 91 (09-16-19 @ 07:54) (66 - 91)  BP: 100/50 (09-16-19 @ 07:54) (82/37 - 100/50)  RR: 16 (09-16-19 @ 07:54) (16 - 18)  SpO2: 94% (09-16-19 @ 07:54) (92% - 94%)  Wt(kg): --    I&O's Summary      Gen: Appears well in NAD  HEENT:  (-)icterus (-)pallor  CV: N S1 S2 1/6 ERYN (+)2 Pulses B/l  Resp:  Clear to ausculatation B/L, normal effort  GI: (+) BS Soft, NT, ND  Lymph:  (-)Edema, (-)obvious lymphadenopathy  Skin: Warm to touch, Normal turgor  Psych: Appropriate mood and affect      TELEMETRY: 	  OFF    ECHO : < from: Transthoracic Echocardiogram (09.03.19 @ 07:20) >  CONCLUSIONS:  1. Normal mitral valve. Mild mitral regurgitation.  2. Normal trileaflet aortic valve.  3. Aortic Root: 3.2 cm.    4. Normal left atrium.  LA volume index = 23 cc/m2.  5. Normal left ventricular internal dimensions and wall  thicknesses.  6. Normal Left Ventricular Systolic Function,  (EF = 55 to  60%)  7. Grade II diastolic dysfunction.  8. Normal right atrium.  9. Normal right ventricular size and systolic function  (TAPSE  2.5cm).  10. RV systolic pressure is normal at  29 mm Hg.  11. There is mild tricuspid regurgitation.  12. Normal pulmonic valve.  13. Normal pericardium with no pericardial effusion.    < end of copied text >      ASSESSMENT/PLAN: 	54y  Female PMH of Asthma, presented with SOB, cough and right sided chest pain since yesterday morning. She started feeling short of breath, present at rest, not relieved with Albuterol inhaler, unable to ambulate due to SOB since yesterday. It was associated with wheeze and cough with whitish sputum found with right lower lobe consolidation and klebsiella bacteremia.    - Echo with normal LV fx ,  Stress test with no ischemia, cardiac markers neg   - ECG with no ischemic changes  - s/p bronch with BAL  - Abx per ID   - Pulmonary f/u  - No need for further inpatient cardiac work up.    Rashid Roberts MD, [facility]  BEEPER [phone]

## 2019-09-16 NOTE — DISCHARGE NOTE PROVIDER - HOSPITAL COURSE
53 yo female with PMH of Asthma, presented with SOB, cough and right sided chest pain since yesterday morning. She started feeling short of breath, present at rest, not relieved with Albuterol inhaler, unable to ambulate due to SOB since yesterday. It was associated with wheeze and cough with whitish sputum. She also complains of subjective fever. Chest pain is present on right lower side, 7/10 in severity, on/off, non radiating, stabbing in nature, worse with sitting and moving and lying on that side, relieved with Motrin.         Off note, she quit Heroin abuse on August 3rd and since then has been on Methadone 55mg OD. She goes to the Methadone clinic every day, 6 days a week for her Methadone dose. Clinic #: [phone]-Ext: [phone]. She has taken her dose for today. Primary team to call Clinic to confirm dose. Clinic was closed today.         CXR 9/2/19->Opacification/consolidation of the right lower lung field. This most likely represents pneumonia. A superimposed right pleural effusion cannot be excluded.    Chest CT 9/2/19->Soft tissue impaction of the right middle and lower lobe bronchi. Extensive airspace consolidations ofthe right middle and lower lobes. Overall findings may reflect severe multifocal pneumonia, however close follow-up to in short resolution and exclude endobronchial mass. Mild mediastinal adenopathy.    chest CT 9/10->Dense consolidation in the right middle and lower lobe with diffuse right lung bronchovascular thickening as well as mediastinal adenopathy.     Complete opacification of the right middle lobe bronchi. Findings concerning for underlying neoplasm. Extension of right lung consolidation into the right hilum. Limited     evaluation for vascular invasion or constriction secondary to lack of IV contrast.    Nuclear Stress Test 9/5/19->    * Negative ECG evidence of ischemia after IV    administration of Regadenoson.    * Myocardial Perfusion SPECT results are normal.    * No clear evidence of ischemia or infarct.    * Post-stress resting myocardial perfusion gated SPECT    imaging was performed (LVEF = 55 %) with no regional wall    motion abnormalities.    Blood Cx 9/5->Neg    Bronchial Wash Culture 9/14->        Pt. admitted to 53 yo female with PMH of Asthma, presented with SOB, cough and right sided chest pain since yesterday morning. She started feeling short of breath, present at rest, not relieved with Albuterol inhaler, unable to ambulate due to SOB since yesterday. It was associated with wheeze and cough with whitish sputum. She also complains of subjective fever. Chest pain is present on right lower side, 7/10 in severity, on/off, non radiating, stabbing in nature, worse with sitting and moving and lying on that side, relieved with Motrin.         Off note, she quit Heroin abuse on August 3rd and since then has been on Methadone 55mg OD. She goes to the Methadone clinic every day, 6 days a week for her Methadone dose. Clinic #: [phone]-Ext: [phone]. She has taken her dose for today. Primary team to call Clinic to confirm dose. Clinic was closed today.         CXR 9/2/19->Opacification/consolidation of the right lower lung field. This most likely represents pneumonia. A superimposed right pleural effusion cannot be excluded.    Chest CT 9/2/19->Soft tissue impaction of the right middle and lower lobe bronchi. Extensive airspace consolidations ofthe right middle and lower lobes. Overall findings may reflect severe multifocal pneumonia, however close follow-up to in short resolution and exclude endobronchial mass. Mild mediastinal adenopathy.    chest CT 9/10->Dense consolidation in the right middle and lower lobe with diffuse right lung bronchovascular thickening as well as mediastinal adenopathy.     Complete opacification of the right middle lobe bronchi. Findings concerning for underlying neoplasm. Extension of right lung consolidation into the right hilum. Limited     evaluation for vascular invasion or constriction secondary to lack of IV contrast.    Nuclear Stress Test 9/5/19->    * Negative ECG evidence of ischemia after IV    administration of Regadenoson.    * Myocardial Perfusion SPECT results are normal.    * No clear evidence of ischemia or infarct.    * Post-stress resting myocardial perfusion gated SPECT    imaging was performed (LVEF = 55 %) with no regional wall    motion abnormalities.    Blood Cx 9/5->Neg    Bronchial Wash Culture 9/14->        Pt. admitted to medicine for management of multifocal PNA and Klebsiella PNA Bacteremia likely due to UTI.  Pt. followed by ID , treated with IV Zosyn with improved however persistent right sided chest pain.  Repeat CT chest showed dense right middle and lower lobe consolidation associated with mediastinal adenopathy, concerning for adenopathy.  Pt. followed by Pulm Dr. Guo, underwent bronchoscopy 9/14, bronchial wash............. 53 yo female with PMH of Asthma, presented with SOB, cough and right sided chest pain since yesterday morning. She started feeling short of breath, present at rest, not relieved with Albuterol inhaler, unable to ambulate due to SOB since yesterday. It was associated with wheeze and cough with whitish sputum. She also complains of subjective fever. Chest pain is present on right lower side, 7/10 in severity, on/off, non radiating, stabbing in nature, worse with sitting and moving and lying on that side, relieved with Motrin.         Off note, she quit Heroin abuse on August 3rd and since then has been on Methadone 55mg OD. She goes to the Methadone clinic every day, 6 days a week for her Methadone dose. Clinic #: [phone]-Ext: [phone]. She has taken her dose for today. Primary team to call Clinic to confirm dose. Clinic was closed today.         CXR 9/2/19->Opacification/consolidation of the right lower lung field. This most likely represents pneumonia. A superimposed right pleural effusion cannot be excluded.    Chest CT 9/2/19->Soft tissue impaction of the right middle and lower lobe bronchi. Extensive airspace consolidations ofthe right middle and lower lobes. Overall findings may reflect severe multifocal pneumonia, however close follow-up to in short resolution and exclude endobronchial mass. Mild mediastinal adenopathy.    chest CT 9/10->Dense consolidation in the right middle and lower lobe with diffuse right lung bronchovascular thickening as well as mediastinal adenopathy.     Complete opacification of the right middle lobe bronchi. Findings concerning for underlying neoplasm. Extension of right lung consolidation into the right hilum. Limited     evaluation for vascular invasion or constriction secondary to lack of IV contrast.    Nuclear Stress Test 9/5/19->    * Negative ECG evidence of ischemia after IV    administration of Regadenoson.    * Myocardial Perfusion SPECT results are normal.    * No clear evidence of ischemia or infarct.    * Post-stress resting myocardial perfusion gated SPECT    imaging was performed (LVEF = 55 %) with no regional wall    motion abnormalities.        Bronchial Wash Culture 9/14->Normal Respiratory Usha present    Right Lung bronchoalveolar lavage->Negative for malignant cells.            Pt. admitted to medicine for management of multifocal PNA and Klebsiella PNA Bacteremia likely due to UTI.  Pt. followed by ID , treated with IV Zosyn with improved however persistent right sided chest pain.  Repeat CT chest showed dense right middle and lower lobe consolidation associated with mediastinal adenopathy, concerning for adenopathy.  Pt. followed by Pulm Dr. Guo, underwent bronchoscopy 9/14, bronchial wash showed normal usha, cytology negative for malignancy.  Pt. instructed to f/u with repeat CT Chest none contrast in 4 weeks as outpt. 55 yo female with PMH of Asthma, presented with SOB, cough and right sided chest pain since yesterday morning. She started feeling short of breath, present at rest, not relieved with Albuterol inhaler, unable to ambulate due to SOB since yesterday. It was associated with wheeze and cough with whitish sputum. She also complains of subjective fever. Chest pain is present on right lower side, 7/10 in severity, on/off, non radiating, stabbing in nature, worse with sitting and moving and lying on that side, relieved with Motrin.         Off note, she quit Heroin abuse on August 3rd and since then has been on Methadone 55mg OD. She goes to the Methadone clinic every day, 6 days a week for her Methadone dose. Clinic #: [phone]-Ext: [phone]. She has taken her dose for today. Primary team to call Clinic to confirm dose. Clinic was closed today.         CXR 9/2/19->Opacification/consolidation of the right lower lung field. This most likely represents pneumonia. A superimposed right pleural effusion cannot be excluded.    Chest CT 9/2/19->Soft tissue impaction of the right middle and lower lobe bronchi. Extensive airspace consolidations ofthe right middle and lower lobes. Overall findings may reflect severe multifocal pneumonia, however close follow-up to in short resolution and exclude endobronchial mass. Mild mediastinal adenopathy.    chest CT 9/10->Dense consolidation in the right middle and lower lobe with diffuse right lung bronchovascular thickening as well as mediastinal adenopathy.     Complete opacification of the right middle lobe bronchi. Findings concerning for underlying neoplasm. Extension of right lung consolidation into the right hilum. Limited     evaluation for vascular invasion or constriction secondary to lack of IV contrast.    Nuclear Stress Test 9/5/19->    * Negative ECG evidence of ischemia after IV    administration of Regadenoson.    * Myocardial Perfusion SPECT results are normal.    * No clear evidence of ischemia or infarct.    * Post-stress resting myocardial perfusion gated SPECT    imaging was performed (LVEF = 55 %) with no regional wall    motion abnormalities.        Bronchial Wash Culture 9/14->Normal Respiratory Usha present    Right Lung bronchoalveolar lavage->Negative for malignant cells.            Pt. admitted to medicine for management of multifocal PNA and Klebsiella PNA Bacteremia likely due to UTI.  Pt. followed by ID , treated with IV Zosyn with improved however persistent right sided chest pain.  Repeat CT chest showed dense right middle and lower lobe consolidation associated with mediastinal adenopathy, concerning for adenopathy.  Pt. followed by Pulm Dr. Guo, underwent bronchoscopy 9/14, bronchial wash showed normal usha, cytology negative for malignancy.  Pt. instructed to f/u with repeat CT Chest none contrast in 4 weeks as outpt.    Pt. noted with anemia, underwent EGD/Colonoscopy which were unremarkable, anemia likely due to different etiology.  Pt. to follow up with hem/onc.        Pt. is medically stable for discharge.  Shelter placement arranged by [facility] as pt. is homeless.

## 2019-09-17 LAB
ALBUMIN SERPL ELPH-MCNC: 1.4 G/DL — LOW (ref 3.5–5)
ALP SERPL-CCNC: 121 U/L — HIGH (ref 40–120)
ALT FLD-CCNC: 16 U/L DA — SIGNIFICANT CHANGE UP (ref 10–60)
ANION GAP SERPL CALC-SCNC: 4 MMOL/L — LOW (ref 5–17)
AST SERPL-CCNC: 13 U/L — SIGNIFICANT CHANGE UP (ref 10–40)
BILIRUB SERPL-MCNC: 0.4 MG/DL — SIGNIFICANT CHANGE UP (ref 0.2–1.2)
BUN SERPL-MCNC: 7 MG/DL — SIGNIFICANT CHANGE UP (ref 7–18)
CALCIUM SERPL-MCNC: 8.4 MG/DL — SIGNIFICANT CHANGE UP (ref 8.4–10.5)
CHLORIDE SERPL-SCNC: 105 MMOL/L — SIGNIFICANT CHANGE UP (ref 96–108)
CO2 SERPL-SCNC: 30 MMOL/L — SIGNIFICANT CHANGE UP (ref 22–31)
CREAT SERPL-MCNC: 0.66 MG/DL — SIGNIFICANT CHANGE UP (ref 0.5–1.3)
GLUCOSE SERPL-MCNC: 107 MG/DL — HIGH (ref 70–99)
HCT VFR BLD CALC: 23.6 % — LOW (ref 34.5–45)
HCT VFR BLD CALC: 26.1 % — LOW (ref 34.5–45)
HGB BLD-MCNC: 7.5 G/DL — LOW (ref 11.5–15.5)
HGB BLD-MCNC: 8.5 G/DL — LOW (ref 11.5–15.5)
MCHC RBC-ENTMCNC: 30.6 PG — SIGNIFICANT CHANGE UP (ref 27–34)
MCHC RBC-ENTMCNC: 31 PG — SIGNIFICANT CHANGE UP (ref 27–34)
MCHC RBC-ENTMCNC: 31.8 GM/DL — LOW (ref 32–36)
MCHC RBC-ENTMCNC: 32.6 GM/DL — SIGNIFICANT CHANGE UP (ref 32–36)
MCV RBC AUTO: 93.9 FL — SIGNIFICANT CHANGE UP (ref 80–100)
MCV RBC AUTO: 97.5 FL — SIGNIFICANT CHANGE UP (ref 80–100)
NRBC # BLD: 0 /100 WBCS — SIGNIFICANT CHANGE UP (ref 0–0)
NRBC # BLD: 0 /100 WBCS — SIGNIFICANT CHANGE UP (ref 0–0)
PLATELET # BLD AUTO: 342 K/UL — SIGNIFICANT CHANGE UP (ref 150–400)
PLATELET # BLD AUTO: 390 K/UL — SIGNIFICANT CHANGE UP (ref 150–400)
POTASSIUM SERPL-MCNC: 3.8 MMOL/L — SIGNIFICANT CHANGE UP (ref 3.5–5.3)
POTASSIUM SERPL-SCNC: 3.8 MMOL/L — SIGNIFICANT CHANGE UP (ref 3.5–5.3)
PROT SERPL-MCNC: 6.4 G/DL — SIGNIFICANT CHANGE UP (ref 6–8.3)
RBC # BLD: 2.42 M/UL — LOW (ref 3.8–5.2)
RBC # BLD: 2.78 M/UL — LOW (ref 3.8–5.2)
RBC # FLD: 14.4 % — SIGNIFICANT CHANGE UP (ref 10.3–14.5)
RBC # FLD: 15.1 % — HIGH (ref 10.3–14.5)
SODIUM SERPL-SCNC: 139 MMOL/L — SIGNIFICANT CHANGE UP (ref 135–145)
WBC # BLD: 7.13 K/UL — SIGNIFICANT CHANGE UP (ref 3.8–10.5)
WBC # BLD: 7.58 K/UL — SIGNIFICANT CHANGE UP (ref 3.8–10.5)
WBC # FLD AUTO: 7.13 K/UL — SIGNIFICANT CHANGE UP (ref 3.8–10.5)
WBC # FLD AUTO: 7.58 K/UL — SIGNIFICANT CHANGE UP (ref 3.8–10.5)

## 2019-09-17 RX ORDER — GUAIFENESIN/DEXTROMETHORPHAN 600MG-30MG
10 TABLET, EXTENDED RELEASE 12 HR ORAL EVERY 4 HOURS
Refills: 0 | Status: DISCONTINUED | OUTPATIENT
Start: 2019-09-17 | End: 2019-09-17

## 2019-09-17 RX ORDER — GUAIFENESIN/DEXTROMETHORPHAN 600MG-30MG
10 TABLET, EXTENDED RELEASE 12 HR ORAL
Refills: 0 | Status: DISCONTINUED | OUTPATIENT
Start: 2019-09-17 | End: 2019-09-18

## 2019-09-17 RX ORDER — GUAIFENESIN/DEXTROMETHORPHAN 600MG-30MG
10 TABLET, EXTENDED RELEASE 12 HR ORAL ONCE
Refills: 0 | Status: COMPLETED | OUTPATIENT
Start: 2019-09-17 | End: 2019-09-17

## 2019-09-17 RX ORDER — SOD SULF/SODIUM/NAHCO3/KCL/PEG
4000 SOLUTION, RECONSTITUTED, ORAL ORAL ONCE
Refills: 0 | Status: COMPLETED | OUTPATIENT
Start: 2019-09-17 | End: 2019-09-17

## 2019-09-17 RX ORDER — PHYTONADIONE (VIT K1) 5 MG
10 TABLET ORAL ONCE
Refills: 0 | Status: COMPLETED | OUTPATIENT
Start: 2019-09-17 | End: 2019-09-17

## 2019-09-17 RX ORDER — MULTIVIT WITH MIN/MFOLATE/K2 340-15/3 G
1 POWDER (GRAM) ORAL ONCE
Refills: 0 | Status: COMPLETED | OUTPATIENT
Start: 2019-09-17 | End: 2019-09-17

## 2019-09-17 RX ADMIN — Medication 1 PATCH: at 11:00

## 2019-09-17 RX ADMIN — TRAMADOL HYDROCHLORIDE 50 MILLIGRAM(S): 50 TABLET ORAL at 20:22

## 2019-09-17 RX ADMIN — POLYETHYLENE GLYCOL 3350 17 GRAM(S): 17 POWDER, FOR SOLUTION ORAL at 11:03

## 2019-09-17 RX ADMIN — Medication 650 MILLIGRAM(S): at 06:20

## 2019-09-17 RX ADMIN — Medication 1 PATCH: at 19:50

## 2019-09-17 RX ADMIN — Medication 10 MILLILITER(S): at 05:17

## 2019-09-17 RX ADMIN — Medication 20 MILLIGRAM(S): at 20:22

## 2019-09-17 RX ADMIN — Medication 1 PATCH: at 08:52

## 2019-09-17 RX ADMIN — Medication 1 MILLIGRAM(S): at 11:02

## 2019-09-17 RX ADMIN — LORATADINE 10 MILLIGRAM(S): 10 TABLET ORAL at 11:02

## 2019-09-17 RX ADMIN — Medication 30 MILLILITER(S): at 11:02

## 2019-09-17 RX ADMIN — PIPERACILLIN AND TAZOBACTAM 25 GRAM(S): 4; .5 INJECTION, POWDER, LYOPHILIZED, FOR SOLUTION INTRAVENOUS at 10:13

## 2019-09-17 RX ADMIN — Medication 10 MILLILITER(S): at 14:12

## 2019-09-17 RX ADMIN — PIPERACILLIN AND TAZOBACTAM 25 GRAM(S): 4; .5 INJECTION, POWDER, LYOPHILIZED, FOR SOLUTION INTRAVENOUS at 21:41

## 2019-09-17 RX ADMIN — Medication 1000 UNIT(S): at 11:02

## 2019-09-17 RX ADMIN — TRAMADOL HYDROCHLORIDE 50 MILLIGRAM(S): 50 TABLET ORAL at 05:09

## 2019-09-17 RX ADMIN — Medication 1 PATCH: at 11:03

## 2019-09-17 RX ADMIN — Medication 10 MILLILITER(S): at 21:48

## 2019-09-17 RX ADMIN — Medication 100 MILLIGRAM(S): at 11:02

## 2019-09-17 RX ADMIN — TRAMADOL HYDROCHLORIDE 50 MILLIGRAM(S): 50 TABLET ORAL at 04:04

## 2019-09-17 RX ADMIN — TRAMADOL HYDROCHLORIDE 50 MILLIGRAM(S): 50 TABLET ORAL at 20:55

## 2019-09-17 RX ADMIN — PIPERACILLIN AND TAZOBACTAM 25 GRAM(S): 4; .5 INJECTION, POWDER, LYOPHILIZED, FOR SOLUTION INTRAVENOUS at 01:47

## 2019-09-17 RX ADMIN — METHADONE HYDROCHLORIDE 55 MILLIGRAM(S): 40 TABLET ORAL at 11:02

## 2019-09-17 RX ADMIN — Medication 4000 MILLILITER(S): at 17:42

## 2019-09-17 RX ADMIN — Medication 1 BOTTLE: at 14:10

## 2019-09-17 RX ADMIN — Medication 650 MILLIGRAM(S): at 05:18

## 2019-09-17 RX ADMIN — TRAMADOL HYDROCHLORIDE 50 MILLIGRAM(S): 50 TABLET ORAL at 12:40

## 2019-09-17 RX ADMIN — Medication 3 MILLILITER(S): at 08:29

## 2019-09-17 RX ADMIN — PANTOPRAZOLE SODIUM 40 MILLIGRAM(S): 20 TABLET, DELAYED RELEASE ORAL at 05:17

## 2019-09-17 RX ADMIN — Medication 102 MILLIGRAM(S): at 13:06

## 2019-09-17 RX ADMIN — Medication 10 MILLILITER(S): at 08:50

## 2019-09-17 RX ADMIN — TRAMADOL HYDROCHLORIDE 50 MILLIGRAM(S): 50 TABLET ORAL at 12:11

## 2019-09-17 NOTE — CHART NOTE - NSCHARTNOTEFT_GEN_A_CORE
Reassessment:   54yFemalePatient is a 54y old  Female who presents with a chief complaint of SOB, Right sided CP, cough since yesterday morning (17 Sep 2019 11:05)      Factors impacting intake: [ ] none [ ] nausea  [ ] vomiting [ ] diarrhea [ ] constipation  [ ]chewing problems [ ] swallowing issues  [X ] other: PNA, asthma, Methadone dependent     Diet Presciption: Diet, Clear Liquid (09-17-19 @ 11:05)  Diet, NPO after Midnight:      NPO Start Date: 17-Sep-2019,   NPO Start Time: 23:59 (09-17-19 @ 11:05)    Intake: Patient visited, reports fair po intake, consuming 40%-70% of meals, drinking at least 1-2 bottle of Ensure per day, complaints of nausea with vomiting in the am due increased coughing, also constipated & receiving laxative, provided food preferences & update kitchen Presently pt. on clear liquid diet & pending possible cytology today, rec. c/w diet as ordered & add MVI/minerals daily as medically feasible. RD available.    Daily   % Weight Change    Pertinent Medications: MEDICATIONS  (STANDING):  ALBUTerol/ipratropium for Nebulization 3 milliLiter(s) Nebulizer every 6 hours  cholecalciferol 1000 Unit(s) Oral daily  docusate sodium 100 milliGRAM(s) Oral daily  folic acid 1 milliGRAM(s) Oral daily  guaiFENesin/dextromethorphan  Syrup 10 milliLiter(s) Oral three times a day  influenza   Vaccine 0.5 milliLiter(s) IntraMuscular once  loratadine 10 milliGRAM(s) Oral daily  methadone    Tablet 55 milliGRAM(s) Oral daily  nicotine - 21 mG/24Hr(s) Patch 1 patch Transdermal daily  pantoprazole    Tablet 40 milliGRAM(s) Oral before breakfast  phytonadione  IVPB 10 milliGRAM(s) IV Intermittent once  piperacillin/tazobactam IVPB. 3.375 Gram(s) IV Intermittent once  piperacillin/tazobactam IVPB.. 3.375 Gram(s) IV Intermittent every 8 hours  senna 2 Tablet(s) Oral at bedtime    MEDICATIONS  (PRN):  acetaminophen   Tablet .. 650 milliGRAM(s) Oral every 6 hours PRN Temp greater or equal to 38C (100.4F), Mild Pain (1 - 3)  ALBUTerol/ipratropium for Nebulization 3 milliLiter(s) Nebulizer every 8 hours PRN Bronchospasm  aluminum hydroxide/magnesium hydroxide/simethicone Suspension 30 milliLiter(s) Oral every 6 hours PRN Dyspepsia  polyethylene glycol 3350 17 Gram(s) Oral daily PRN Constipation  traMADol 50 milliGRAM(s) Oral every 8 hours PRN Severe Pain (7 - 10)    Pertinent Labs: 09-17 Na139 mmol/L Glu 107 mg/dL<H> K+ 3.8 mmol/L Cr  0.66 mg/dL BUN 7 mg/dL 09-17 Alb 1.4 g/dL<L> 09-03 [id number] 4.8 % 09-03 Chol 118 mg/dL LDL 49 mg/dL HDL 16 mg/dL<L> Trig 263 mg/dL<H>     CAPILLARY BLOOD GLUCOSE        Skin: intact     Estimated Needs:   [ ] no change since previous assessment  [ ] recalculated:     Previous Nutrition Diagnosis:   [ ] Inadequate Energy Intake [ ]Inadequate Oral Intake [ ] Excessive Energy Intake   [ ] Underweight [ ] Increased Nutrient Needs [ ] Overweight/Obesity   [ ] Altered GI Function [ ] Unintended Weight Loss [ ] Food & Nutrition Related Knowledge Deficit [Severe] Malnutrition     Nutrition Diagnosis is [X ] ongoing  [ ] resolved [ ] not applicable     New Nutrition Diagnosis: [ ] not applicable     Interventions: To meet nutrition needs   Recommend  [ ] Change Diet To:  [ ] Nutrition Supplement  [ ] Nutrition Support  [ ] Other:     Monitoring and Evaluation:   [X ] PO intake [ x ] Tolerance to diet prescription [ x ] weights [ x ] labs[ x ] follow up per protocol  [ ] other:

## 2019-09-17 NOTE — PROGRESS NOTE ADULT - PROBLEM SELECTOR PLAN 8
Right side pain likely costo-chrondritis  due to pneumonia and lung mass  cardiology w/u all negative ( EKG/ Echo / stress test and CE )   c/w pain regimen.

## 2019-09-17 NOTE — PROGRESS NOTE ADULT - PROBLEM SELECTOR PLAN 1
Hg was  7.2 on 9.16 ( on admission hg was 11)-- likely iron def anemia vs malignancy vs GIB  no s/s of active bleeding --> Dr. Mary valdovinos and plan is EGD and colonoscopy tomorrow      Dr Rosado evaluated the case as well- likely due to sepsis, continue folic acid and monitor CBC  Hg is 7.5 today s/p 1PRBC yesterday, will give one more unit today with Vit K 10mg IVPB and f/u CBC at 6pm today  Stool for OB ordered Hg was  7.2 on 9.16 ( on admission hg was 11)-- likely iron def anemia vs malignancy vs GIB  no s/s of active bleeding --> Dr. Mary valdovinos and plan is EGD and colonoscopy tomorrow   -- needs pulmo clearance    Dr Rosado evaluated the case as well- likely due to sepsis, continue folic acid and monitor CBC  Hg is 7.5 today s/p 1PRBC yesterday, will give one more unit today with Vit K 10mg IVPB and f/u CBC at 6pm today  Stool for OB ordered

## 2019-09-17 NOTE — PROGRESS NOTE ADULT - SUBJECTIVE AND OBJECTIVE BOX
Subjective:   Persistent anemia   Objective:    MEDICATIONS  (STANDING):  ALBUTerol/ipratropium for Nebulization 3 milliLiter(s) Nebulizer every 6 hours  bisacodyl 20 milliGRAM(s) Oral once  cholecalciferol 1000 Unit(s) Oral daily  docusate sodium 100 milliGRAM(s) Oral daily  folic acid 1 milliGRAM(s) Oral daily  guaiFENesin/dextromethorphan  Syrup 10 milliLiter(s) Oral three times a day  influenza   Vaccine 0.5 milliLiter(s) IntraMuscular once  loratadine 10 milliGRAM(s) Oral daily  magnesium citrate Oral Solution 1 Bottle Oral once  methadone    Tablet 55 milliGRAM(s) Oral daily  nicotine - 21 mG/24Hr(s) Patch 1 patch Transdermal daily  pantoprazole    Tablet 40 milliGRAM(s) Oral before breakfast  piperacillin/tazobactam IVPB. 3.375 Gram(s) IV Intermittent once  piperacillin/tazobactam IVPB.. 3.375 Gram(s) IV Intermittent every 8 hours  polyethylene glycol/electrolyte Solution. 4000 milliLiter(s) Oral once  senna 2 Tablet(s) Oral at bedtime    MEDICATIONS  (PRN):  acetaminophen   Tablet .. 650 milliGRAM(s) Oral every 6 hours PRN Temp greater or equal to 38C (100.4F), Mild Pain (1 - 3)  ALBUTerol/ipratropium for Nebulization 3 milliLiter(s) Nebulizer every 8 hours PRN Bronchospasm  aluminum hydroxide/magnesium hydroxide/simethicone Suspension 30 milliLiter(s) Oral every 6 hours PRN Dyspepsia  polyethylene glycol 3350 17 Gram(s) Oral daily PRN Constipation  traMADol 50 milliGRAM(s) Oral every 8 hours PRN Severe Pain (7 - 10)              Vital Signs Last 24 Hrs  T(C): 36.9 (17 Sep 2019 09:14), Max: 37.3 (16 Sep 2019 16:40)  T(F): 98.4 (17 Sep 2019 09:14), Max: 99.1 (16 Sep 2019 16:40)  HR: 84 (17 Sep 2019 09:46) (70 - 94)  BP: 109/54 (17 Sep 2019 09:14) (85/43 - 109/54)  BP(mean): --  RR: 17 (17 Sep 2019 09:14) (16 - 18)  SpO2: 92% (17 Sep 2019 09:46) (92% - 100%)      General:   NAD.  HEENT:    Normal appearance of conjunctiva, ears, nose, lips, oropharynx, and oral mucosa, anicteric.  Neck:  No masses, no asymmetry.  Lymph Nodes:  No lymphadenopathy.   Cardiovascular:  RRR normal S1/S2, no murmur.  Respiratory:  CTA B/L, normal respiratory effort.   Abdominal:   soft, no masses or tenderness, normoactive BS, NT/ND, no HSM.  Extremities:   No clubbing or cyanosis, normal capillary refill, no edema.   Skin:   No rash, jaundice, lesions, eczema.         LABS:                        7.5    7.13  )-----------( 342      ( 17 Sep 2019 10:15 )             23.6     09-17    139  |  105  |  7   ----------------------------<  107<H>  3.8   |  30  |  0.66    Ca    8.4      17 Sep 2019 10:15    TPro  6.4  /  Alb  1.4<L>  /  TBili  0.4  /  DBili  x   /  AST  13  /  ALT  16  /  AlkPhos  121<H>  09-17    PT/INR - ( 16 Sep 2019 09:54 )   PT: 16.7 sec;   INR: 1.49 ratio         PTT - ( 16 Sep 2019 09:54 )  PTT:55.4 sec      RADIOLOGY & ADDITIONAL TESTS:

## 2019-09-17 NOTE — PROGRESS NOTE ADULT - SUBJECTIVE AND OBJECTIVE BOX
Patient denies angina, SOB Review of systems otherwise (-)    acetaminophen   Tablet .. 650 milliGRAM(s) Oral every 6 hours PRN  ALBUTerol/ipratropium for Nebulization 3 milliLiter(s) Nebulizer every 8 hours PRN  ALBUTerol/ipratropium for Nebulization 3 milliLiter(s) Nebulizer every 6 hours  aluminum hydroxide/magnesium hydroxide/simethicone Suspension 30 milliLiter(s) Oral every 6 hours PRN  bisacodyl 20 milliGRAM(s) Oral once  cholecalciferol 1000 Unit(s) Oral daily  docusate sodium 100 milliGRAM(s) Oral daily  folic acid 1 milliGRAM(s) Oral daily  guaiFENesin/dextromethorphan  Syrup 10 milliLiter(s) Oral three times a day  influenza   Vaccine 0.5 milliLiter(s) IntraMuscular once  loratadine 10 milliGRAM(s) Oral daily  magnesium citrate Oral Solution 1 Bottle Oral once  methadone    Tablet 55 milliGRAM(s) Oral daily  nicotine - 21 mG/24Hr(s) Patch 1 patch Transdermal daily  pantoprazole    Tablet 40 milliGRAM(s) Oral before breakfast  piperacillin/tazobactam IVPB. 3.375 Gram(s) IV Intermittent once  piperacillin/tazobactam IVPB.. 3.375 Gram(s) IV Intermittent every 8 hours  polyethylene glycol 3350 17 Gram(s) Oral daily PRN  polyethylene glycol/electrolyte Solution. 4000 milliLiter(s) Oral once  senna 2 Tablet(s) Oral at bedtime  traMADol 50 milliGRAM(s) Oral every 8 hours PRN                            7.5    7.13  )-----------( 342      ( 17 Sep 2019 10:15 )             23.6       Hemoglobin: 7.5 g/dL (09-17 @ 10:15)  Hemoglobin: 7.2 g/dL (09-16 @ 06:54)  Hemoglobin: 7.9 g/dL (09-15 @ 06:12)      09-17    139  |  105  |  7   ----------------------------<  107<H>  3.8   |  30  |  0.66    Ca    8.4      17 Sep 2019 10:15    TPro  6.4  /  Alb  1.4<L>  /  TBili  0.4  /  DBili  x   /  AST  13  /  ALT  16  /  AlkPhos  121<H>  09-17    Creatinine Trend: 0.66<--, 0.65<--, 0.72<--, 0.63<--, 0.58<--, 0.61<--    COAGS:           T(C): 36.9 (09-17-19 @ 09:14), Max: 37.3 (09-16-19 @ 16:40)  HR: 84 (09-17-19 @ 09:46) (70 - 94)  BP: 109/54 (09-17-19 @ 09:14) (85/43 - 109/54)  RR: 17 (09-17-19 @ 09:14) (16 - 18)  SpO2: 92% (09-17-19 @ 09:46) (92% - 100%)  Wt(kg): --    I&O's Summary      Gen: Appears well in NAD  HEENT:  (-)icterus (-)pallor  CV: N S1 S2 1/6 ERYN (+)2 Pulses B/l  Resp:  Clear to ausculatation B/L, normal effort  GI: (+) BS Soft, NT, ND  Lymph:  (-)Edema, (-)obvious lymphadenopathy  Skin: Warm to touch, Normal turgor  Psych: Appropriate mood and affect      TELEMETRY: 	  OFF    ECHO : < from: Transthoracic Echocardiogram (09.03.19 @ 07:20) >  CONCLUSIONS:  1. Normal mitral valve. Mild mitral regurgitation.  2. Normal trileaflet aortic valve.  3. Aortic Root: 3.2 cm.    4. Normal left atrium.  LA volume index = 23 cc/m2.  5. Normal left ventricular internal dimensions and wall  thicknesses.  6. Normal Left Ventricular Systolic Function,  (EF = 55 to  60%)  7. Grade II diastolic dysfunction.  8. Normal right atrium.  9. Normal right ventricular size and systolic function  (TAPSE  2.5cm).  10. RV systolic pressure is normal at  29 mm Hg.  11. There is mild tricuspid regurgitation.  12. Normal pulmonic valve.  13. Normal pericardium with no pericardial effusion.    < end of copied text >      ASSESSMENT/PLAN: 	54y  Female PMH of Asthma, presented with SOB, cough and right sided chest pain since yesterday morning. She started feeling short of breath, present at rest, not relieved with Albuterol inhaler, unable to ambulate due to SOB since yesterday. It was associated with wheeze and cough with whitish sputum found with right lower lobe consolidation and klebsiella bacteremia.    - Echo with normal LV fx ,  Stress test with no ischemia, cardiac markers neg   - ECG with no ischemic changes  - s/p bronch with BAL  - Abx per ID   - Pulmonary f/u  - No need for further inpatient cardiac work up.  - GI w/u in progress  - I will sign off please call back if needed    Rashid Roberts MD, [facility]  BEEPER [phone]

## 2019-09-17 NOTE — PROGRESS NOTE ADULT - SUBJECTIVE AND OBJECTIVE BOX
NP Note discussed with  Primary Attending    Patient is a 54y old  Female who presents with a chief complaint of SOB, Right sided CP, cough since yesterday morning (16 Sep 2019 22:09)    HPI : 53 yo female, homeless with PMH of Asthma, who  presented with SOB, cough and right sided chest pain x 1 day. She started feeling short of breath, present at rest, not relieved with Albuterol inhaler. CXR reveals opacification/consolidation of the RLL . Admitted to medicine for pneumonia. Completed 14days of zosyn regimen. Still has cough, but no SOB or sputum reported. Afebrile.    CT chest showed right lung mass with concern for underlying neoplasm . Pt had a bronchoscopy last Friday, unable to do biopsy due to prolonged PTT .  Pt will have EGD and colonoscopy tomorrow due to Hg dropped from 11 on admission to 7.2  yesterday. s/p 1 PRBC, still Hg is 7.5, denies any s/s of bleeding.        GOC  : full code       INTERVAL HPI/OVERNIGHT EVENTS: no new complaints    MEDICATIONS  (STANDING):  ALBUTerol/ipratropium for Nebulization 3 milliLiter(s) Nebulizer every 6 hours  cholecalciferol 1000 Unit(s) Oral daily  docusate sodium 100 milliGRAM(s) Oral daily  folic acid 1 milliGRAM(s) Oral daily  guaiFENesin/dextromethorphan  Syrup 10 milliLiter(s) Oral three times a day  influenza   Vaccine 0.5 milliLiter(s) IntraMuscular once  loratadine 10 milliGRAM(s) Oral daily  methadone    Tablet 55 milliGRAM(s) Oral daily  nicotine - 21 mG/24Hr(s) Patch 1 patch Transdermal daily  pantoprazole    Tablet 40 milliGRAM(s) Oral before breakfast  phytonadione  IVPB 10 milliGRAM(s) IV Intermittent once  piperacillin/tazobactam IVPB. 3.375 Gram(s) IV Intermittent once  piperacillin/tazobactam IVPB.. 3.375 Gram(s) IV Intermittent every 8 hours  senna 2 Tablet(s) Oral at bedtime    MEDICATIONS  (PRN):  acetaminophen   Tablet .. 650 milliGRAM(s) Oral every 6 hours PRN Temp greater or equal to 38C (100.4F), Mild Pain (1 - 3)  ALBUTerol/ipratropium for Nebulization 3 milliLiter(s) Nebulizer every 8 hours PRN Bronchospasm  aluminum hydroxide/magnesium hydroxide/simethicone Suspension 30 milliLiter(s) Oral every 6 hours PRN Dyspepsia  polyethylene glycol 3350 17 Gram(s) Oral daily PRN Constipation  traMADol 50 milliGRAM(s) Oral every 8 hours PRN Severe Pain (7 - 10)      __________________________________________________  REVIEW OF SYSTEMS:    CONSTITUTIONAL: No fever,   EYES: no acute visual disturbances  NECK: No pain or stiffness  RESPIRATORY:  cough but no  shortness of breath  CARDIOVASCULAR: No chest pain, no palpitations  GASTROINTESTINAL: No pain. No nausea or vomiting; No diarrhea   NEUROLOGICAL: No headache or numbness, no tremors  MUSCULOSKELETAL: No joint pain, no muscle pain  GENITOURINARY: no dysuria, no frequency, no hesitancy  PSYCHIATRY: no depression , no anxiety  ALL OTHER  ROS negative        Vital Signs Last 24 Hrs  T(C): 36.9 (17 Sep 2019 09:14), Max: 37.3 (16 Sep 2019 16:40)  T(F): 98.4 (17 Sep 2019 09:14), Max: 99.1 (16 Sep 2019 16:40)  HR: 84 (17 Sep 2019 09:46) (70 - 94)  BP: 109/54 (17 Sep 2019 09:14) (85/43 - 109/54)  BP(mean): --  RR: 17 (17 Sep 2019 09:14) (16 - 18)  SpO2: 92% (17 Sep 2019 09:46) (92% - 100%)    ________________________________________________  PHYSICAL EXAM:  GENERAL: NAD  HEENT: Normocephalic;  conjunctivae and sclerae clear; moist mucous membranes;   NECK : supple  CHEST/LUNG: dry cough on exam, no wheezing or rales   HEART: S1 S2  regular; no murmurs, gallops or rubs  ABDOMEN: Soft, Nontender, Nondistended; Bowel sounds present  EXTREMITIES: no cyanosis; no edema; no calf tenderness  SKIN: warm and dry; no rash  NERVOUS SYSTEM:  Awake and alert; Oriented  to place, person and time ; no new deficits    _________________________________________________  LABS:                        7.5    7.13  )-----------( 342      ( 17 Sep 2019 10:15 )             23.6     09-17    139  |  105  |  7   ----------------------------<  107<H>  3.8   |  30  |  0.66    Ca    8.4      17 Sep 2019 10:15    TPro  6.4  /  Alb  1.4<L>  /  TBili  0.4  /  DBili  x   /  AST  13  /  ALT  16  /  AlkPhos  121<H>  09-17    PT/INR - ( 16 Sep 2019 09:54 )   PT: 16.7 sec;   INR: 1.49 ratio         PTT - ( 16 Sep 2019 09:54 )  PTT:55.4 sec    CAPILLARY BLOOD GLUCOSE            RADIOLOGY & ADDITIONAL TESTS:  EXAM:  US ABDOMEN COMPLETE                            PROCEDURE DATE:  09/16/2019          INTERPRETATION:  CLINICAL INFORMATION: Elevated liver enzymes    COMPARISON: None available.    TECHNIQUE: Sonography of the abdomen.     FINDINGS:    Liver: Within normal limits.    Bile ducts: Normal caliber. Common bile duct measures 3.6 mm.     Gallbladder: 2.8 cm gallstone. No evidence for thickened gallbladder   wall, pericholecystic fluid or ultrasonic Cardenas's sign.        Pancreas: Visualized portions are within normal limits.    Spleen: Enlarged at 13.9 cm.    Right kidney: 10.7 cm. No hydronephrosis. Within normal limits.    Left kidney: 12.9 cm.  No hydronephrosis. Within normal limits.    Ascites: None.    Aorta and IVC: Visualized portions are within normal limits.    IMPRESSION:     Cholelithiasis without evidence for acute cholecystitis.    Splenomegaly.                RINKU JUSTIN M.D., ATTENDING RADIOLOGIST  This document has been electronically signed. Sep 16 2019  4:22PM                Imaging Personally Reviewed:  YES    Consultant(s) Notes Reviewed:   YES    Care Discussed with Consultants : Dr. Hernandez and Dr. Rosado     Plan of care was discussed with patient and /or primary care giver; all questions and concerns were addressed and care was aligned with patient's wishes. NP Note discussed with  Primary Attending    Patient is a 54y old  Female who presents with a chief complaint of SOB, Right sided CP, cough since yesterday morning (16 Sep 2019 22:09)    HPI : 55 yo female, homeless with PMH of Asthma, who  presented with SOB, cough and right sided chest pain x 1 day. She started feeling short of breath, present at rest, not relieved with Albuterol inhaler. CXR reveals opacification/consolidation of the RLL . Admitted to medicine for pneumonia. Completed 14days of zosyn regimen. Still has cough, but no SOB or sputum reported. Afebrile.    CT chest showed right lung mass with concern for underlying neoplasm . Pt had a bronchoscopy last Friday, unable to do biopsy due to prolonged PTT .  Pt will have EGD and colonoscopy tomorrow due to Hg dropped from 11 on admission to 7.2  yesterday. s/p 1 PRBC, still Hg is 7.5, denies any s/s of bleeding.        GOC  : full code       INTERVAL HPI/OVERNIGHT EVENTS: no new complaints    MEDICATIONS  (STANDING):  ALBUTerol/ipratropium for Nebulization 3 milliLiter(s) Nebulizer every 6 hours  cholecalciferol 1000 Unit(s) Oral daily  docusate sodium 100 milliGRAM(s) Oral daily  folic acid 1 milliGRAM(s) Oral daily  guaiFENesin/dextromethorphan  Syrup 10 milliLiter(s) Oral three times a day  influenza   Vaccine 0.5 milliLiter(s) IntraMuscular once  loratadine 10 milliGRAM(s) Oral daily  methadone    Tablet 55 milliGRAM(s) Oral daily  nicotine - 21 mG/24Hr(s) Patch 1 patch Transdermal daily  pantoprazole    Tablet 40 milliGRAM(s) Oral before breakfast  phytonadione  IVPB 10 milliGRAM(s) IV Intermittent once  piperacillin/tazobactam IVPB. 3.375 Gram(s) IV Intermittent once  piperacillin/tazobactam IVPB.. 3.375 Gram(s) IV Intermittent every 8 hours  senna 2 Tablet(s) Oral at bedtime    MEDICATIONS  (PRN):  acetaminophen   Tablet .. 650 milliGRAM(s) Oral every 6 hours PRN Temp greater or equal to 38C (100.4F), Mild Pain (1 - 3)  ALBUTerol/ipratropium for Nebulization 3 milliLiter(s) Nebulizer every 8 hours PRN Bronchospasm  aluminum hydroxide/magnesium hydroxide/simethicone Suspension 30 milliLiter(s) Oral every 6 hours PRN Dyspepsia  polyethylene glycol 3350 17 Gram(s) Oral daily PRN Constipation  traMADol 50 milliGRAM(s) Oral every 8 hours PRN Severe Pain (7 - 10)      __________________________________________________  REVIEW OF SYSTEMS:    CONSTITUTIONAL: No fever,   EYES: no acute visual disturbances  NECK: No pain or stiffness  RESPIRATORY:  cough but no  shortness of breath, expiratory wheezing   CARDIOVASCULAR: No chest pain, no palpitations  GASTROINTESTINAL: No pain. No nausea or vomiting; No diarrhea   NEUROLOGICAL: No headache or numbness, no tremors  MUSCULOSKELETAL: No joint pain, no muscle pain  GENITOURINARY: no dysuria, no frequency, no hesitancy  PSYCHIATRY: no depression , no anxiety  ALL OTHER  ROS negative        Vital Signs Last 24 Hrs  T(C): 36.9 (17 Sep 2019 09:14), Max: 37.3 (16 Sep 2019 16:40)  T(F): 98.4 (17 Sep 2019 09:14), Max: 99.1 (16 Sep 2019 16:40)  HR: 84 (17 Sep 2019 09:46) (70 - 94)  BP: 109/54 (17 Sep 2019 09:14) (85/43 - 109/54)  BP(mean): --  RR: 17 (17 Sep 2019 09:14) (16 - 18)  SpO2: 92% (17 Sep 2019 09:46) (92% - 100%)    ________________________________________________  PHYSICAL EXAM:  GENERAL: NAD  HEENT: Normocephalic;  conjunctivae and sclerae clear; moist mucous membranes;   NECK : supple  CHEST/LUNG: dry cough on exam, no wheezing or rales   HEART: S1 S2  regular; no murmurs, gallops or rubs  ABDOMEN: Soft, Nontender, Nondistended; Bowel sounds present  EXTREMITIES: no cyanosis; no edema; no calf tenderness  SKIN: warm and dry; no rash  NERVOUS SYSTEM:  Awake and alert; Oriented  to place, person and time ; no new deficits    _________________________________________________  LABS:                        7.5    7.13  )-----------( 342      ( 17 Sep 2019 10:15 )             23.6     09-17    139  |  105  |  7   ----------------------------<  107<H>  3.8   |  30  |  0.66    Ca    8.4      17 Sep 2019 10:15    TPro  6.4  /  Alb  1.4<L>  /  TBili  0.4  /  DBili  x   /  AST  13  /  ALT  16  /  AlkPhos  121<H>  09-17    PT/INR - ( 16 Sep 2019 09:54 )   PT: 16.7 sec;   INR: 1.49 ratio         PTT - ( 16 Sep 2019 09:54 )  PTT:55.4 sec    CAPILLARY BLOOD GLUCOSE            RADIOLOGY & ADDITIONAL TESTS:  EXAM:  US ABDOMEN COMPLETE                            PROCEDURE DATE:  09/16/2019          INTERPRETATION:  CLINICAL INFORMATION: Elevated liver enzymes    COMPARISON: None available.    TECHNIQUE: Sonography of the abdomen.     FINDINGS:    Liver: Within normal limits.    Bile ducts: Normal caliber. Common bile duct measures 3.6 mm.     Gallbladder: 2.8 cm gallstone. No evidence for thickened gallbladder   wall, pericholecystic fluid or ultrasonic Cardenas's sign.        Pancreas: Visualized portions are within normal limits.    Spleen: Enlarged at 13.9 cm.    Right kidney: 10.7 cm. No hydronephrosis. Within normal limits.    Left kidney: 12.9 cm.  No hydronephrosis. Within normal limits.    Ascites: None.    Aorta and IVC: Visualized portions are within normal limits.    IMPRESSION:     Cholelithiasis without evidence for acute cholecystitis.    Splenomegaly.                RINKU JUSTIN M.D., ATTENDING RADIOLOGIST  This document has been electronically signed. Sep 16 2019  4:22PM                Imaging Personally Reviewed:  YES    Consultant(s) Notes Reviewed:   YES    Care Discussed with Consultants : Dr. Hernandez and Dr. Rosado     Plan of care was discussed with patient and /or primary care giver; all questions and concerns were addressed and care was aligned with patient's wishes. NP Note discussed with  Primary Attending    Patient is a 54y old  Female who presents with a chief complaint of SOB, Right sided CP, cough since yesterday morning (16 Sep 2019 22:09)    HPI : 55 yo female, homeless with PMH of Asthma, who  presented with SOB, cough and right sided chest pain x 1 day. She started feeling short of breath, present at rest, not relieved with Albuterol inhaler. CXR reveals opacification/consolidation of the RLL . Admitted to medicine for pneumonia. Completed 14days of zosyn regimen. Still has cough, but no SOB or sputum reported. Afebrile.    CT chest showed right lung mass with concern for underlying neoplasm . Pt had a bronchoscopy last Friday, unable to do biopsy due to prolonged PTT .  Pt will have EGD and colonoscopy tomorrow due to Hg dropped from 11 on admission to 7.2  yesterday. s/p 1 PRBC, still Hg is 7.5, denies any s/s of bleeding.    Dr. Hernandez requested pulmonology clearance for EGD/ colonoscopy, I spoke with Dr. Guo, Pt is low risk for pulmonology complication to have scopy tomorrow       GOC  : full code       INTERVAL HPI/OVERNIGHT EVENTS: no new complaints    MEDICATIONS  (STANDING):  ALBUTerol/ipratropium for Nebulization 3 milliLiter(s) Nebulizer every 6 hours  cholecalciferol 1000 Unit(s) Oral daily  docusate sodium 100 milliGRAM(s) Oral daily  folic acid 1 milliGRAM(s) Oral daily  guaiFENesin/dextromethorphan  Syrup 10 milliLiter(s) Oral three times a day  influenza   Vaccine 0.5 milliLiter(s) IntraMuscular once  loratadine 10 milliGRAM(s) Oral daily  methadone    Tablet 55 milliGRAM(s) Oral daily  nicotine - 21 mG/24Hr(s) Patch 1 patch Transdermal daily  pantoprazole    Tablet 40 milliGRAM(s) Oral before breakfast  phytonadione  IVPB 10 milliGRAM(s) IV Intermittent once  piperacillin/tazobactam IVPB. 3.375 Gram(s) IV Intermittent once  piperacillin/tazobactam IVPB.. 3.375 Gram(s) IV Intermittent every 8 hours  senna 2 Tablet(s) Oral at bedtime    MEDICATIONS  (PRN):  acetaminophen   Tablet .. 650 milliGRAM(s) Oral every 6 hours PRN Temp greater or equal to 38C (100.4F), Mild Pain (1 - 3)  ALBUTerol/ipratropium for Nebulization 3 milliLiter(s) Nebulizer every 8 hours PRN Bronchospasm  aluminum hydroxide/magnesium hydroxide/simethicone Suspension 30 milliLiter(s) Oral every 6 hours PRN Dyspepsia  polyethylene glycol 3350 17 Gram(s) Oral daily PRN Constipation  traMADol 50 milliGRAM(s) Oral every 8 hours PRN Severe Pain (7 - 10)      __________________________________________________  REVIEW OF SYSTEMS:    CONSTITUTIONAL: No fever,   EYES: no acute visual disturbances  NECK: No pain or stiffness  RESPIRATORY:  cough but no  shortness of breath, expiratory wheezing   CARDIOVASCULAR: No chest pain, no palpitations  GASTROINTESTINAL: No pain. No nausea or vomiting; No diarrhea   NEUROLOGICAL: No headache or numbness, no tremors  MUSCULOSKELETAL: No joint pain, no muscle pain  GENITOURINARY: no dysuria, no frequency, no hesitancy  PSYCHIATRY: no depression , no anxiety  ALL OTHER  ROS negative        Vital Signs Last 24 Hrs  T(C): 36.9 (17 Sep 2019 09:14), Max: 37.3 (16 Sep 2019 16:40)  T(F): 98.4 (17 Sep 2019 09:14), Max: 99.1 (16 Sep 2019 16:40)  HR: 84 (17 Sep 2019 09:46) (70 - 94)  BP: 109/54 (17 Sep 2019 09:14) (85/43 - 109/54)  BP(mean): --  RR: 17 (17 Sep 2019 09:14) (16 - 18)  SpO2: 92% (17 Sep 2019 09:46) (92% - 100%)    ________________________________________________  PHYSICAL EXAM:  GENERAL: NAD  HEENT: Normocephalic;  conjunctivae and sclerae clear; moist mucous membranes;   NECK : supple  CHEST/LUNG: dry cough on exam, no wheezing or rales   HEART: S1 S2  regular; no murmurs, gallops or rubs  ABDOMEN: Soft, Nontender, Nondistended; Bowel sounds present  EXTREMITIES: no cyanosis; no edema; no calf tenderness  SKIN: warm and dry; no rash  NERVOUS SYSTEM:  Awake and alert; Oriented  to place, person and time ; no new deficits    _________________________________________________  LABS:                        7.5    7.13  )-----------( 342      ( 17 Sep 2019 10:15 )             23.6     09-17    139  |  105  |  7   ----------------------------<  107<H>  3.8   |  30  |  0.66    Ca    8.4      17 Sep 2019 10:15    TPro  6.4  /  Alb  1.4<L>  /  TBili  0.4  /  DBili  x   /  AST  13  /  ALT  16  /  AlkPhos  121<H>  09-17    PT/INR - ( 16 Sep 2019 09:54 )   PT: 16.7 sec;   INR: 1.49 ratio         PTT - ( 16 Sep 2019 09:54 )  PTT:55.4 sec    CAPILLARY BLOOD GLUCOSE            RADIOLOGY & ADDITIONAL TESTS:  EXAM:  US ABDOMEN COMPLETE                            PROCEDURE DATE:  09/16/2019          INTERPRETATION:  CLINICAL INFORMATION: Elevated liver enzymes    COMPARISON: None available.    TECHNIQUE: Sonography of the abdomen.     FINDINGS:    Liver: Within normal limits.    Bile ducts: Normal caliber. Common bile duct measures 3.6 mm.     Gallbladder: 2.8 cm gallstone. No evidence for thickened gallbladder   wall, pericholecystic fluid or ultrasonic Cardenas's sign.        Pancreas: Visualized portions are within normal limits.    Spleen: Enlarged at 13.9 cm.    Right kidney: 10.7 cm. No hydronephrosis. Within normal limits.    Left kidney: 12.9 cm.  No hydronephrosis. Within normal limits.    Ascites: None.    Aorta and IVC: Visualized portions are within normal limits.    IMPRESSION:     Cholelithiasis without evidence for acute cholecystitis.    Splenomegaly.                RINKU JUSTIN M.D., ATTENDING RADIOLOGIST  This document has been electronically signed. Sep 16 2019  4:22PM                Imaging Personally Reviewed:  YES    Consultant(s) Notes Reviewed:   YES    Care Discussed with Consultants : Dr. Hernandez and Dr. Rosado     Plan of care was discussed with patient and /or primary care giver; all questions and concerns were addressed and care was aligned with patient's wishes.

## 2019-09-17 NOTE — PROGRESS NOTE ADULT - PROBLEM SELECTOR PLAN 2
s/p bronchoscopy on 9.13.2019  no bx at that time  rec - CT chest without contrast 6-8 weeks and f/u with Dr. Guo as outpatient

## 2019-09-17 NOTE — PROGRESS NOTE ADULT - SUBJECTIVE AND OBJECTIVE BOX
Time of Visit:  Patient seen and examined.     MEDICATIONS  (STANDING):  ALBUTerol/ipratropium for Nebulization 3 milliLiter(s) Nebulizer every 6 hours  bisacodyl 20 milliGRAM(s) Oral once  cholecalciferol 1000 Unit(s) Oral daily  docusate sodium 100 milliGRAM(s) Oral daily  folic acid 1 milliGRAM(s) Oral daily  influenza   Vaccine 0.5 milliLiter(s) IntraMuscular once  loratadine 10 milliGRAM(s) Oral daily  methadone    Tablet 55 milliGRAM(s) Oral daily  nicotine - 21 mG/24Hr(s) Patch 1 patch Transdermal daily  pantoprazole    Tablet 40 milliGRAM(s) Oral before breakfast  piperacillin/tazobactam IVPB. 3.375 Gram(s) IV Intermittent once  piperacillin/tazobactam IVPB.. 3.375 Gram(s) IV Intermittent every 8 hours  polyethylene glycol/electrolyte Solution. 4000 milliLiter(s) Oral once  senna 2 Tablet(s) Oral at bedtime      MEDICATIONS  (PRN):  acetaminophen   Tablet .. 650 milliGRAM(s) Oral every 6 hours PRN Temp greater or equal to 38C (100.4F), Mild Pain (1 - 3)  ALBUTerol/ipratropium for Nebulization 3 milliLiter(s) Nebulizer every 8 hours PRN Bronchospasm  aluminum hydroxide/magnesium hydroxide/simethicone Suspension 30 milliLiter(s) Oral every 6 hours PRN Dyspepsia  polyethylene glycol 3350 17 Gram(s) Oral daily PRN Constipation  traMADol 50 milliGRAM(s) Oral every 8 hours PRN Severe Pain (7 - 10)       Medications up to date at time of exam.    ROS; No fever, chills, cough, congestion.   PHYSICAL EXAMINATION:    Vital Signs Last 24 Hrs  T(C): 37 (17 Sep 2019 14:33), Max: 37.3 (16 Sep 2019 16:40)  T(F): 98.6 (17 Sep 2019 14:33), Max: 99.1 (16 Sep 2019 16:40)  HR: 77 (17 Sep 2019 14:33) (70 - 94)  BP: 100/46 (17 Sep 2019 14:33) (85/43 - 109/54)  BP(mean): --  RR: 20 (17 Sep 2019 14:33) (16 - 20)  SpO2: 95% (17 Sep 2019 14:33) (92% - 100%)   (if applicable)    General: Alert and oriented. No acute distress.     HEENT: Head is normocephalic and atraumatic. No nasal tenderness. Extraocular muscles are intact. Moist mucosa.     NECK: Supple, no palpable adenopathy.    LUNGS: Clear to auscultation, no wheezing, rales, or rhonchi. No use of accessory muscle.     HEART:  S1 S2 regular. No click/ rub.     ABDOMEN: Soft, nontender, and nondistended.  No abdominal guarding. Active bowel sounds.     EXTREMITIES: Without any cyanosis, clubbing, rash, lesions or edema.    NEUROLOGIC: Awake, alert, oriented.     SKIN: Warm and moist. Non diaphoretic.       LABS:                        7.5    7.13  )-----------( 342      ( 17 Sep 2019 10:15 )             23.6     09-17    139  |  105  |  7   ----------------------------<  107<H>  3.8   |  30  |  0.66    Ca    8.4      17 Sep 2019 10:15    TPro  6.4  /  Alb  1.4<L>  /  TBili  0.4  /  DBili  x   /  AST  13  /  ALT  16  /  AlkPhos  121<H>  09-17    PT/INR - ( 16 Sep 2019 09:54 )   PT: 16.7 sec;   INR: 1.49 ratio         PTT - ( 16 Sep 2019 09:54 )  PTT:55.4 sec          RADIOLOGY & ADDITIONAL STUDIES:  EKG:   CT chest:  < from: CT Chest No Cont (09.10.19 @ 09:26) >    EXAM:  CT CHEST                            PROCEDURE DATE:  09/10/2019          INTERPRETATION:  CLINICAL INDICATION: 54 years  Female with TO EVALUATE   RIGHT LUNG LESIONS  AS ABOVE. Pneumonia.    COMPARISON: 9/2/2019    PROCEDURE:   CT of the Chest was performed without intravenous contrast.  Sagittal and coronal reformats were performed.      FINDINGS:    LUNGS AND AIRWAYS: Persistent dense consolidation involving most of the   right middle lobe extending into the right lower lobe. Right lower lobe   air bronchograms present. Diffuse thickening of the bronchovascular   interstitium within the right lung. Right lower lobe dependent   atelectasis. Mild discoid atelectasis in the left lower lobe. Mild   bilateral upper lobe emphysema.    PLEURA: Trace right pleural effusion. No left pleural effusion.    MEDIASTINUM AND GÓMEZ: Right upper paratracheal lymph node measuring 7 mm   in short axis (3:37). Right upper paratracheal lymph node measuring 9 mm   in short axis (3:49). Right lower paratracheal lymph node measuring 1 cm   in short axis (3:58). Enlarged subcarinal lymph node measuring 2.2 cm in   short axis (3:71). Extension of right lung consolidation into the right   hilum. Limited evaluation for vascular invasion or constrictionsecondary   to lack of IV contrast. Complete opacification of the right middle lobe   bronchi.    VESSELS: No evidence of aortic aneurysm. Please see Steinmann hilar   findings.    HEART: Heart size is normal. Trace pericardial effusion.    CHEST WALL AND LOWER NECK: Within normal limits.    VISUALIZED UPPER ABDOMEN: Suspected splenomegaly. The spleen is   incompletely imaged. The adrenal glands are incompletely imaged.    BONES: Mild cervical and thoracic degenerative changes.    IMPRESSION:     Dense consolidation in the right middle and lower lobe with diffuse right   lung bronchovascular thickening as well as mediastinal adenopathy.   Complete opacification of the right middle lobe bronchi. Findings   concerning for underlying neoplasm.    Extension of right lung consolidation into the right hilum. Limited   evaluation for vascular invasion or constriction secondary to lack of IV   contrast.    Mild emphysema.      IMPRESSION: 54y Female PAST MEDICAL & SURGICAL HISTORY:  Asthma  No significant past surgical history     Impression; 53 Y/O Female presented with SOB, Cough, Chest Pain. CT chest show RML bronchus obstruction with mediastinal adenopathy may be due to pan vs malignancy. Repeated CT chest shows no change in lung consolidatio vs mass. +klebsiella pneum bacteremia 9/3 repeat BC 9/5 neg. HIV negative. IR refused bx . s/p Bronchoscopy 09-13-19 , no Biopsy done and EBUS was not attempted due to coagulopathy.     Suggestion;  O2 saturation 96% room air. No need for outpatient Oxygen supplementation due to been saturating good room air.    Reinforced the importance of smoking cessation and Daily compliance to Daily medications.   Continue with antibiotics as per ID recommendations.   Continue Duoneb Q 6 hours. Discontinue PRN DuoNeb Q 8 hours.   Monitoring of Anemia, Hgb/Hct 7.5/23.6. Time of Visit:  Patient seen and examined.     MEDICATIONS  (STANDING):  ALBUTerol/ipratropium for Nebulization 3 milliLiter(s) Nebulizer every 6 hours  bisacodyl 20 milliGRAM(s) Oral once  cholecalciferol 1000 Unit(s) Oral daily  docusate sodium 100 milliGRAM(s) Oral daily  folic acid 1 milliGRAM(s) Oral daily  influenza   Vaccine 0.5 milliLiter(s) IntraMuscular once  loratadine 10 milliGRAM(s) Oral daily  methadone    Tablet 55 milliGRAM(s) Oral daily  nicotine - 21 mG/24Hr(s) Patch 1 patch Transdermal daily  pantoprazole    Tablet 40 milliGRAM(s) Oral before breakfast  piperacillin/tazobactam IVPB. 3.375 Gram(s) IV Intermittent once  piperacillin/tazobactam IVPB.. 3.375 Gram(s) IV Intermittent every 8 hours  polyethylene glycol/electrolyte Solution. 4000 milliLiter(s) Oral once  senna 2 Tablet(s) Oral at bedtime      MEDICATIONS  (PRN):  acetaminophen   Tablet .. 650 milliGRAM(s) Oral every 6 hours PRN Temp greater or equal to 38C (100.4F), Mild Pain (1 - 3)  ALBUTerol/ipratropium for Nebulization 3 milliLiter(s) Nebulizer every 8 hours PRN Bronchospasm  aluminum hydroxide/magnesium hydroxide/simethicone Suspension 30 milliLiter(s) Oral every 6 hours PRN Dyspepsia  polyethylene glycol 3350 17 Gram(s) Oral daily PRN Constipation  traMADol 50 milliGRAM(s) Oral every 8 hours PRN Severe Pain (7 - 10)       Medications up to date at time of exam.    ROS; No fever, chills, cough, congestion.   PHYSICAL EXAMINATION:    Vital Signs Last 24 Hrs  T(C): 37 (17 Sep 2019 14:33), Max: 37.3 (16 Sep 2019 16:40)  T(F): 98.6 (17 Sep 2019 14:33), Max: 99.1 (16 Sep 2019 16:40)  HR: 77 (17 Sep 2019 14:33) (70 - 94)  BP: 100/46 (17 Sep 2019 14:33) (85/43 - 109/54)  BP(mean): --  RR: 20 (17 Sep 2019 14:33) (16 - 20)  SpO2: 95% (17 Sep 2019 14:33) (92% - 100%)   (if applicable)    General: Alert and oriented. No acute distress.     HEENT: Head is normocephalic and atraumatic. No nasal tenderness. Extraocular muscles are intact. Moist mucosa.     NECK: Supple, no palpable adenopathy.    LUNGS: Clear to auscultation, no wheezing, rales, or rhonchi. No use of accessory muscle.     HEART:  S1 S2 regular. No click/ rub.     ABDOMEN: Soft, nontender, and nondistended.  No abdominal guarding. Active bowel sounds.     EXTREMITIES: Without any cyanosis, clubbing, rash, lesions or edema.    NEUROLOGIC: Awake, alert, oriented.     SKIN: Warm and moist. Non diaphoretic.       LABS:                        7.5    7.13  )-----------( 342      ( 17 Sep 2019 10:15 )             23.6     09-17    139  |  105  |  7   ----------------------------<  107<H>  3.8   |  30  |  0.66    Ca    8.4      17 Sep 2019 10:15    TPro  6.4  /  Alb  1.4<L>  /  TBili  0.4  /  DBili  x   /  AST  13  /  ALT  16  /  AlkPhos  121<H>  09-17    PT/INR - ( 16 Sep 2019 09:54 )   PT: 16.7 sec;   INR: 1.49 ratio         PTT - ( 16 Sep 2019 09:54 )  PTT:55.4 sec          RADIOLOGY & ADDITIONAL STUDIES:  EKG:   CT chest:  < from: CT Chest No Cont (09.10.19 @ 09:26) >    EXAM:  CT CHEST                            PROCEDURE DATE:  09/10/2019          INTERPRETATION:  CLINICAL INDICATION: 54 years  Female with TO EVALUATE   RIGHT LUNG LESIONS  AS ABOVE. Pneumonia.    COMPARISON: 9/2/2019    PROCEDURE:   CT of the Chest was performed without intravenous contrast.  Sagittal and coronal reformats were performed.      FINDINGS:    LUNGS AND AIRWAYS: Persistent dense consolidation involving most of the   right middle lobe extending into the right lower lobe. Right lower lobe   air bronchograms present. Diffuse thickening of the bronchovascular   interstitium within the right lung. Right lower lobe dependent   atelectasis. Mild discoid atelectasis in the left lower lobe. Mild   bilateral upper lobe emphysema.    PLEURA: Trace right pleural effusion. No left pleural effusion.    MEDIASTINUM AND GÓMEZ: Right upper paratracheal lymph node measuring 7 mm   in short axis (3:37). Right upper paratracheal lymph node measuring 9 mm   in short axis (3:49). Right lower paratracheal lymph node measuring 1 cm   in short axis (3:58). Enlarged subcarinal lymph node measuring 2.2 cm in   short axis (3:71). Extension of right lung consolidation into the right   hilum. Limited evaluation for vascular invasion or constrictionsecondary   to lack of IV contrast. Complete opacification of the right middle lobe   bronchi.    VESSELS: No evidence of aortic aneurysm. Please see Steinmann hilar   findings.    HEART: Heart size is normal. Trace pericardial effusion.    CHEST WALL AND LOWER NECK: Within normal limits.    VISUALIZED UPPER ABDOMEN: Suspected splenomegaly. The spleen is   incompletely imaged. The adrenal glands are incompletely imaged.    BONES: Mild cervical and thoracic degenerative changes.    IMPRESSION:     Dense consolidation in the right middle and lower lobe with diffuse right   lung bronchovascular thickening as well as mediastinal adenopathy.   Complete opacification of the right middle lobe bronchi. Findings   concerning for underlying neoplasm.    Extension of right lung consolidation into the right hilum. Limited   evaluation for vascular invasion or constriction secondary to lack of IV   contrast.    Mild emphysema.      IMPRESSION: 54y Female PAST MEDICAL & SURGICAL HISTORY:  Asthma  No significant past surgical history     Impression; 55 Y/O Female presented with SOB, Cough, Chest Pain. CT chest show RML bronchus obstruction with mediastinal adenopathy may be due to pan vs malignancy. Repeated CT chest shows no change in lung consolidatio vs mass. +klebsiella pneum bacteremia 9/3 repeat BC 9/5 neg. HIV negative. IR refused bx . s/p Bronchoscopy 09-13-19 , no Biopsy done and EBUS was not attempted due to coagulopathy.     Suggestion;  O2 saturation 96% room air. No need for outpatient Oxygen supplementation due to been saturating good room air.    Reinforced the importance of smoking cessation and Daily compliance to Daily medications.   Continue with antibiotics as per ID recommendations.   Continue Duoneb Q 6 hours. Discontinue PRN DuoNeb Q 8 hours.   Monitoring of Anemia, Hgb/Hct 7.5/23.6.   Agree with above assessment and plan as transcribed.

## 2019-09-17 NOTE — PROGRESS NOTE ADULT - ASSESSMENT
54 year old female with persistent anemia.       Plan:  Patient agreeable for endoscopic workup  -Please have pulmonary give clearance   Magnesium citrate 300 ml stat now followed by GL 4 liters 4 pm  and Bisacodyl 20 mg at 6 pm  NPO post MN for EGD and colonoscopy tomorrow    Advanced care planning was discussed with patient and family.  Advanced care planning forms were reviewed and discussed.  Risks, benefits and alternatives of gastroenterologic procedures were discussed in detail and all questions were answered.

## 2019-09-18 ENCOUNTER — RESULT REVIEW (OUTPATIENT)
Age: 54
End: 2019-09-18

## 2019-09-18 LAB
ALBUMIN SERPL ELPH-MCNC: 1.6 G/DL — LOW (ref 3.5–5)
ALP SERPL-CCNC: 134 U/L — HIGH (ref 40–120)
ALT FLD-CCNC: 15 U/L DA — SIGNIFICANT CHANGE UP (ref 10–60)
ANION GAP SERPL CALC-SCNC: 5 MMOL/L — SIGNIFICANT CHANGE UP (ref 5–17)
APTT BLD: 62.5 SEC — HIGH (ref 27.5–36.3)
AST SERPL-CCNC: 11 U/L — SIGNIFICANT CHANGE UP (ref 10–40)
BILIRUB SERPL-MCNC: 0.5 MG/DL — SIGNIFICANT CHANGE UP (ref 0.2–1.2)
BUN SERPL-MCNC: 6 MG/DL — LOW (ref 7–18)
CALCIUM SERPL-MCNC: 8.6 MG/DL — SIGNIFICANT CHANGE UP (ref 8.4–10.5)
CHLORIDE SERPL-SCNC: 101 MMOL/L — SIGNIFICANT CHANGE UP (ref 96–108)
CO2 SERPL-SCNC: 30 MMOL/L — SIGNIFICANT CHANGE UP (ref 22–31)
CREAT SERPL-MCNC: 0.67 MG/DL — SIGNIFICANT CHANGE UP (ref 0.5–1.3)
GLUCOSE SERPL-MCNC: 64 MG/DL — LOW (ref 70–99)
HAV IGM SER-ACNC: SIGNIFICANT CHANGE UP
HBV CORE IGM SER-ACNC: SIGNIFICANT CHANGE UP
HBV SURFACE AG SER-ACNC: SIGNIFICANT CHANGE UP
HBV SURFACE AG SER-ACNC: SIGNIFICANT CHANGE UP
HCT VFR BLD CALC: 27.2 % — LOW (ref 34.5–45)
HCV AB S/CO SERPL IA: 0.2 S/CO — SIGNIFICANT CHANGE UP (ref 0–0.99)
HCV AB SERPL-IMP: SIGNIFICANT CHANGE UP
HCV RNA SERPL NAA DL=5-ACNC: SIGNIFICANT CHANGE UP IU/ML
HCV RNA SPEC NAA+PROBE-LOG IU: SIGNIFICANT CHANGE UP LOGIU/ML
HGB BLD-MCNC: 8.7 G/DL — LOW (ref 11.5–15.5)
INR BLD: 1.38 RATIO — HIGH (ref 0.88–1.16)
MCHC RBC-ENTMCNC: 30.2 PG — SIGNIFICANT CHANGE UP (ref 27–34)
MCHC RBC-ENTMCNC: 32 GM/DL — SIGNIFICANT CHANGE UP (ref 32–36)
MCV RBC AUTO: 94.4 FL — SIGNIFICANT CHANGE UP (ref 80–100)
NRBC # BLD: 0 /100 WBCS — SIGNIFICANT CHANGE UP (ref 0–0)
OB PNL STL: NEGATIVE — SIGNIFICANT CHANGE UP
PLATELET # BLD AUTO: 419 K/UL — HIGH (ref 150–400)
POTASSIUM SERPL-MCNC: 3.2 MMOL/L — LOW (ref 3.5–5.3)
POTASSIUM SERPL-SCNC: 3.2 MMOL/L — LOW (ref 3.5–5.3)
PROT SERPL-MCNC: 7.2 G/DL — SIGNIFICANT CHANGE UP (ref 6–8.3)
PROTHROM AB SERPL-ACNC: 15.5 SEC — HIGH (ref 10–12.9)
RBC # BLD: 2.88 M/UL — LOW (ref 3.8–5.2)
RBC # FLD: 15.1 % — HIGH (ref 10.3–14.5)
SODIUM SERPL-SCNC: 136 MMOL/L — SIGNIFICANT CHANGE UP (ref 135–145)
WBC # BLD: 7.04 K/UL — SIGNIFICANT CHANGE UP (ref 3.8–10.5)
WBC # FLD AUTO: 7.04 K/UL — SIGNIFICANT CHANGE UP (ref 3.8–10.5)

## 2019-09-18 PROCEDURE — 88305 TISSUE EXAM BY PATHOLOGIST: CPT | Mod: 26

## 2019-09-18 PROCEDURE — 88312 SPECIAL STAINS GROUP 1: CPT | Mod: 26

## 2019-09-18 RX ORDER — GUAIFENESIN/DEXTROMETHORPHAN 600MG-30MG
10 TABLET, EXTENDED RELEASE 12 HR ORAL EVERY 4 HOURS
Refills: 0 | Status: DISCONTINUED | OUTPATIENT
Start: 2019-09-18 | End: 2019-09-24

## 2019-09-18 RX ORDER — POTASSIUM CHLORIDE 20 MEQ
40 PACKET (EA) ORAL EVERY 4 HOURS
Refills: 0 | Status: COMPLETED | OUTPATIENT
Start: 2019-09-18 | End: 2019-09-18

## 2019-09-18 RX ADMIN — PANTOPRAZOLE SODIUM 40 MILLIGRAM(S): 20 TABLET, DELAYED RELEASE ORAL at 06:08

## 2019-09-18 RX ADMIN — Medication 1 MILLIGRAM(S): at 11:16

## 2019-09-18 RX ADMIN — Medication 1 PATCH: at 11:16

## 2019-09-18 RX ADMIN — Medication 40 MILLIEQUIVALENT(S): at 13:39

## 2019-09-18 RX ADMIN — Medication 10 MILLILITER(S): at 22:11

## 2019-09-18 RX ADMIN — Medication 3 MILLILITER(S): at 14:40

## 2019-09-18 RX ADMIN — Medication 1 PATCH: at 19:07

## 2019-09-18 RX ADMIN — Medication 3 MILLILITER(S): at 03:24

## 2019-09-18 RX ADMIN — Medication 3 MILLILITER(S): at 20:47

## 2019-09-18 RX ADMIN — Medication 1000 UNIT(S): at 11:16

## 2019-09-18 RX ADMIN — Medication 10 MILLILITER(S): at 01:02

## 2019-09-18 RX ADMIN — TRAMADOL HYDROCHLORIDE 50 MILLIGRAM(S): 50 TABLET ORAL at 18:38

## 2019-09-18 RX ADMIN — TRAMADOL HYDROCHLORIDE 50 MILLIGRAM(S): 50 TABLET ORAL at 11:53

## 2019-09-18 RX ADMIN — TRAMADOL HYDROCHLORIDE 50 MILLIGRAM(S): 50 TABLET ORAL at 19:10

## 2019-09-18 RX ADMIN — Medication 40 MILLIEQUIVALENT(S): at 22:09

## 2019-09-18 RX ADMIN — LORATADINE 10 MILLIGRAM(S): 10 TABLET ORAL at 11:16

## 2019-09-18 RX ADMIN — PIPERACILLIN AND TAZOBACTAM 25 GRAM(S): 4; .5 INJECTION, POWDER, LYOPHILIZED, FOR SOLUTION INTRAVENOUS at 06:08

## 2019-09-18 RX ADMIN — Medication 1 PATCH: at 08:07

## 2019-09-18 RX ADMIN — METHADONE HYDROCHLORIDE 55 MILLIGRAM(S): 40 TABLET ORAL at 11:17

## 2019-09-18 RX ADMIN — TRAMADOL HYDROCHLORIDE 50 MILLIGRAM(S): 50 TABLET ORAL at 10:49

## 2019-09-18 RX ADMIN — Medication 10 MILLILITER(S): at 10:51

## 2019-09-18 RX ADMIN — SENNA PLUS 2 TABLET(S): 8.6 TABLET ORAL at 22:09

## 2019-09-18 RX ADMIN — Medication 10 MILLILITER(S): at 18:40

## 2019-09-18 NOTE — PROGRESS NOTE ADULT - SUBJECTIVE AND OBJECTIVE BOX
Time of Visit:  Patient seen and examined.     MEDICATIONS  (STANDING):  ALBUTerol/ipratropium for Nebulization 3 milliLiter(s) Nebulizer every 6 hours  cholecalciferol 1000 Unit(s) Oral daily  docusate sodium 100 milliGRAM(s) Oral daily  folic acid 1 milliGRAM(s) Oral daily  influenza   Vaccine 0.5 milliLiter(s) IntraMuscular once  loratadine 10 milliGRAM(s) Oral daily  methadone    Tablet 55 milliGRAM(s) Oral daily  nicotine - 21 mG/24Hr(s) Patch 1 patch Transdermal daily  pantoprazole    Tablet 40 milliGRAM(s) Oral before breakfast  piperacillin/tazobactam IVPB. 3.375 Gram(s) IV Intermittent once  potassium chloride    Tablet ER 40 milliEquivalent(s) Oral every 4 hours  senna 2 Tablet(s) Oral at bedtime      MEDICATIONS  (PRN):  acetaminophen   Tablet .. 650 milliGRAM(s) Oral every 6 hours PRN Temp greater or equal to 38C (100.4F), Mild Pain (1 - 3)  ALBUTerol/ipratropium for Nebulization 3 milliLiter(s) Nebulizer every 8 hours PRN Bronchospasm  aluminum hydroxide/magnesium hydroxide/simethicone Suspension 30 milliLiter(s) Oral every 6 hours PRN Dyspepsia  guaiFENesin/dextromethorphan  Syrup 10 milliLiter(s) Oral every 4 hours PRN Cough  polyethylene glycol 3350 17 Gram(s) Oral daily PRN Constipation  traMADol 50 milliGRAM(s) Oral every 8 hours PRN Severe Pain (7 - 10)       Medications up to date at time of exam.      PHYSICAL EXAMINATION:  Patient has no new complaints.  GENERAL: The patient is a well-developed, well-nourished, in no apparent distress.     Vital Signs Last 24 Hrs  T(C): 37.2 (18 Sep 2019 07:39), Max: 37.2 (18 Sep 2019 07:39)  T(F): 98.9 (18 Sep 2019 07:39), Max: 98.9 (18 Sep 2019 07:39)  HR: 86 (18 Sep 2019 07:39) (75 - 87)  BP: 103/53 (18 Sep 2019 07:39) (89/44 - 113/59)  BP(mean): --  RR: 15 (18 Sep 2019 07:39) (14 - 20)  SpO2: 97% (18 Sep 2019 07:39) (96% - 97%)   (if applicable)    Chest Tube (if applicable)    HEENT: Head is normocephalic and atraumatic. Extraocular muscles are intact. Mucous membranes are moist.     NECK: Supple, no palpable adenopathy.    LUNGS: Clear to auscultation, no wheezing, rales, or rhonchi.    HEART: Regular rate and rhythm without murmur.    ABDOMEN: Soft, nontender, and nondistended.  No hepatosplenomegaly is noted.    : No painful voiding, no pelvic pain    EXTREMITIES: Without any cyanosis, clubbing, rash, lesions or edema.    NEUROLOGIC: Awake, alert, oriented, grossly intact    SKIN: Warm, dry, good turgor.      LABS:                        8.7    7.04  )-----------( 419      ( 18 Sep 2019 06:21 )             27.2     09-18    136  |  101  |  6<L>  ----------------------------<  64<L>  3.2<L>   |  30  |  0.67    Ca    8.6      18 Sep 2019 06:21    TPro  7.2  /  Alb  1.6<L>  /  TBili  0.5  /  DBili  x   /  AST  11  /  ALT  15  /  AlkPhos  134<H>  09-18    PT/INR - ( 18 Sep 2019 06:21 )   PT: 15.5 sec;   INR: 1.38 ratio         PTT - ( 18 Sep 2019 06:21 )  PTT:62.5 sec                    MICROBIOLOGY: (if applicable)    RADIOLOGY & ADDITIONAL STUDIES:  EKG:   CXR:  ECHO:    IMPRESSION: 54y Female PAST MEDICAL & SURGICAL HISTORY:  Asthma  No significant past surgical history   p/w           Impression; 55 Y/O Female presented with SOB, Cough, Chest Pain. CT chest show RML bronchus obstruction with mediastinal adenopathy may be due to pan vs malignancy. Repeated CT chest shows no change in lung consolidatio vs mass. +klebsiella pneum bacteremia 9/3 repeat BC 9/5 neg. HIV negative. Pat last day for antibx 9/15. IR refused bx . S/P bronch 9/13.. no bx done and EBUS was not attempted due to coagulopathy.   Bronch culture neg. cytology Pend. Now afebrile x 48 hours with normal WBC.  S/P endoscopy 9/17    Suggestion;  -continue antibx for another 48 hours if pat remain afebrile  -f/u cytology  -repeat CT chest none contrast 4-6 weeks  -continue combivent inhaler 2 puff q6h upon discharge  - continue nicotine patch  -advice to stop smoking   -out pat pul f/u

## 2019-09-18 NOTE — PROGRESS NOTE ADULT - ASSESSMENT
55 yo female, homeless with PMH of Asthma, who  presented with SOB, cough and right sided chest pain x 1 day. She started feeling short of breath, present at rest, not relieved with Albuterol inhaler. CXR reveals opacification/consolidation of the RLL . Admitted to medicine for pneumonia. Completed 14days of zosyn regimen. Still has cough, but no SOB or sputum reported. Afebrile.    CT chest showed right lung mass with concern for underlying neoplasm . Pt had a bronchoscopy last Friday, unable to do biopsy due to prolonged PTT .  Pt will have EGD and colonoscopy tomorrow due to Hg dropped from 11 on admission to 7.2  yesterday. s/p 1 PRBC, still Hg is 7.5, denies any s/s of bleeding.    Dr. Hernandez requested pulmonology clearance for EGD/ colonoscopy, I spoke with Dr. Guo, Pt is low risk for pulmonology complication to have scopy tomorrow   GOC  : full code     9/18-Pt. underwent EGD/Colonoscopy

## 2019-09-18 NOTE — PROGRESS NOTE ADULT - SUBJECTIVE AND OBJECTIVE BOX
no fever or chills  no sob or cough  had EGD and colonoscopy    MEDICATIONS  (STANDING):  ALBUTerol/ipratropium for Nebulization 3 milliLiter(s) Nebulizer every 6 hours  cholecalciferol 1000 Unit(s) Oral daily  docusate sodium 100 milliGRAM(s) Oral daily  folic acid 1 milliGRAM(s) Oral daily  influenza   Vaccine 0.5 milliLiter(s) IntraMuscular once  loratadine 10 milliGRAM(s) Oral daily  methadone    Tablet 55 milliGRAM(s) Oral daily  nicotine - 21 mG/24Hr(s) Patch 1 patch Transdermal daily  pantoprazole    Tablet 40 milliGRAM(s) Oral before breakfast  piperacillin/tazobactam IVPB. 3.375 Gram(s) IV Intermittent once  potassium chloride    Tablet ER 40 milliEquivalent(s) Oral every 4 hours  senna 2 Tablet(s) Oral at bedtime    MEDICATIONS  (PRN):  acetaminophen   Tablet .. 650 milliGRAM(s) Oral every 6 hours PRN Temp greater or equal to 38C (100.4F), Mild Pain (1 - 3)  ALBUTerol/ipratropium for Nebulization 3 milliLiter(s) Nebulizer every 8 hours PRN Bronchospasm  aluminum hydroxide/magnesium hydroxide/simethicone Suspension 30 milliLiter(s) Oral every 6 hours PRN Dyspepsia  guaiFENesin/dextromethorphan  Syrup 10 milliLiter(s) Oral every 4 hours PRN Cough  polyethylene glycol 3350 17 Gram(s) Oral daily PRN Constipation  traMADol 50 milliGRAM(s) Oral every 8 hours PRN Severe Pain (7 - 10)      Allergies    No Known Allergies    Intolerances        Vital Signs Last 24 Hrs  T(C): 37.2 (18 Sep 2019 07:39), Max: 37.2 (18 Sep 2019 07:39)  T(F): 98.9 (18 Sep 2019 07:39), Max: 98.9 (18 Sep 2019 07:39)  HR: 86 (18 Sep 2019 07:39) (75 - 87)  BP: 103/53 (18 Sep 2019 07:39) (89/44 - 113/59)  BP(mean): --  RR: 15 (18 Sep 2019 07:39) (14 - 20)  SpO2: 97% (18 Sep 2019 07:39) (95% - 97%)    PHYSICAL EXAM  General: adult in NAD  HEENT: clear oropharynx, anicteric sclera, pink conjunctiva  Neck: supple  CV: normal S1/S2 with no murmur rubs or gallops  Lungs: positive air movement b/l ant lungs,clear to auscultation, no wheezes, no rales  Abdomen: soft non-tender non-distended, no hepatosplenomegaly  Ext: no clubbing cyanosis or edema  Skin: no rashes and no petechiae  Neuro: alert and oriented X 4, no focal deficits  LABS:                          8.7    7.04  )-----------( 419      ( 18 Sep 2019 06:21 )             27.2         Mean Cell Volume : 94.4 fl  Mean Cell Hemoglobin : 30.2 pg  Mean Cell Hemoglobin Concentration : 32.0 gm/dL  Auto Neutrophil # : x  Auto Lymphocyte # : x  Auto Monocyte # : x  Auto Eosinophil # : x  Auto Basophil # : x  Auto Neutrophil % : x  Auto Lymphocyte % : x  Auto Monocyte % : x  Auto Eosinophil % : x  Auto Basophil % : x    Serial CBC  Hematocrit 27.2  Hemoglobin 8.7  Plat 419  RBC 2.88  WBC 7.04  Serial CBC  Hematocrit 26.1  Hemoglobin 8.5  Plat 390  RBC 2.78  WBC 7.58  Serial CBC  Hematocrit 23.6  Hemoglobin 7.5  Plat 342  RBC 2.42  WBC 7.13  Serial CBC  Hematocrit 23.4  Hemoglobin 7.2  Plat 335  RBC 2.37  WBC 9.33  Serial CBC  Hematocrit 25.8  Hemoglobin 7.9  Plat 308  RBC 2.58  WBC 11.36    09-18    136  |  101  |  6<L>  ----------------------------<  64<L>  3.2<L>   |  30  |  0.67    Ca    8.6      18 Sep 2019 06:21    TPro  7.2  /  Alb  1.6<L>  /  TBili  0.5  /  DBili  x   /  AST  11  /  ALT  15  /  AlkPhos  134<H>  09-18      PT/INR - ( 18 Sep 2019 06:21 )   PT: 15.5 sec;   INR: 1.38 ratio         PTT - ( 18 Sep 2019 06:21 )  PTT:62.5 sec              BLOOD SMEAR INTERPRETATION:       RADIOLOGY & ADDITIONAL STUDIES:

## 2019-09-18 NOTE — PROGRESS NOTE ADULT - SUBJECTIVE AND OBJECTIVE BOX
NP Note discussed with  Primary Attending    Patient is a 54y old  Female who presents with a chief complaint of SOB, Right sided CP, cough since yesterday morning (18 Sep 2019 15:26)      INTERVAL HPI/OVERNIGHT EVENTS: no new complaints    MEDICATIONS  (STANDING):  ALBUTerol/ipratropium for Nebulization 3 milliLiter(s) Nebulizer every 6 hours  cholecalciferol 1000 Unit(s) Oral daily  docusate sodium 100 milliGRAM(s) Oral daily  folic acid 1 milliGRAM(s) Oral daily  influenza   Vaccine 0.5 milliLiter(s) IntraMuscular once  loratadine 10 milliGRAM(s) Oral daily  methadone    Tablet 55 milliGRAM(s) Oral daily  nicotine - 21 mG/24Hr(s) Patch 1 patch Transdermal daily  pantoprazole    Tablet 40 milliGRAM(s) Oral before breakfast  piperacillin/tazobactam IVPB. 3.375 Gram(s) IV Intermittent once  potassium chloride    Tablet ER 40 milliEquivalent(s) Oral every 4 hours  senna 2 Tablet(s) Oral at bedtime    MEDICATIONS  (PRN):  acetaminophen   Tablet .. 650 milliGRAM(s) Oral every 6 hours PRN Temp greater or equal to 38C (100.4F), Mild Pain (1 - 3)  ALBUTerol/ipratropium for Nebulization 3 milliLiter(s) Nebulizer every 8 hours PRN Bronchospasm  aluminum hydroxide/magnesium hydroxide/simethicone Suspension 30 milliLiter(s) Oral every 6 hours PRN Dyspepsia  guaiFENesin/dextromethorphan  Syrup 10 milliLiter(s) Oral every 4 hours PRN Cough  polyethylene glycol 3350 17 Gram(s) Oral daily PRN Constipation  traMADol 50 milliGRAM(s) Oral every 8 hours PRN Severe Pain (7 - 10)      __________________________________________________  REVIEW OF SYSTEMS:    CONSTITUTIONAL: No fever,   EYES: no acute visual disturbances  NECK: No pain or stiffness  RESPIRATORY: No cough; No shortness of breath  CARDIOVASCULAR: No chest pain, no palpitations  GASTROINTESTINAL: No pain. No nausea or vomiting; No diarrhea   NEUROLOGICAL: No headache or numbness, no tremors  MUSCULOSKELETAL: No joint pain, no muscle pain  GENITOURINARY: no dysuria, no frequency, no hesitancy  PSYCHIATRY: no depression , no anxiety  ALL OTHER  ROS negative        Vital Signs Last 24 Hrs  T(C): 37 (18 Sep 2019 16:16), Max: 37.2 (18 Sep 2019 07:39)  T(F): 98.6 (18 Sep 2019 16:16), Max: 98.9 (18 Sep 2019 07:39)  HR: 89 (18 Sep 2019 16:16) (85 - 89)  BP: 92/52 (18 Sep 2019 16:16) (92/52 - 113/59)  BP(mean): --  RR: 17 (18 Sep 2019 16:16) (14 - 18)  SpO2: 97% (18 Sep 2019 16:16) (96% - 97%)    ________________________________________________  PHYSICAL EXAM:  GENERAL: NAD  HEENT: Normocephalic;  conjunctivae and sclerae clear; moist mucous membranes;   NECK : supple  CHEST/LUNG: Clear to auscultation bilaterally with good air entry   HEART: S1 S2  regular; no murmurs, gallops or rubs  ABDOMEN: Soft, Nontender, Nondistended; Bowel sounds present  EXTREMITIES: no cyanosis; no edema; no calf tenderness  SKIN: warm and dry; no rash  NERVOUS SYSTEM:  Awake and alert; Oriented  to place, person and time ; no new deficits    _________________________________________________  LABS:                        8.7    7.04  )-----------( 419      ( 18 Sep 2019 06:21 )             27.2     09-18    136  |  101  |  6<L>  ----------------------------<  64<L>  3.2<L>   |  30  |  0.67    Ca    8.6      18 Sep 2019 06:21    TPro  7.2  /  Alb  1.6<L>  /  TBili  0.5  /  DBili  x   /  AST  11  /  ALT  15  /  AlkPhos  134<H>  09-18    PT/INR - ( 18 Sep 2019 06:21 )   PT: 15.5 sec;   INR: 1.38 ratio         PTT - ( 18 Sep 2019 06:21 )  PTT:62.5 sec    CAPILLARY BLOOD GLUCOSE    RADIOLOGY & ADDITIONAL TESTS:    < from: US Abdomen Complete (09.16.19 @ 16:16) >    EXAM:  US ABDOMEN COMPLETE                            PROCEDURE DATE:  09/16/2019          INTERPRETATION:  CLINICAL INFORMATION: Elevated liver enzymes    COMPARISON: None available.    TECHNIQUE: Sonography of the abdomen.     FINDINGS:    Liver:Within normal limits.    Bile ducts: Normal caliber. Common bile duct measures 3.6 mm.     Gallbladder: 2.8 cm gallstone. No evidence for thickened gallbladder   wall, pericholecystic fluid or ultrasonic Cardenas's sign.        Pancreas: Visualized portions are within normal limits.    Spleen: Enlarged at 13.9 cm.    Right kidney: 10.7 cm. No hydronephrosis. Within normal limits.    Left kidney: 12.9 cm.  No hydronephrosis. Within normal limits.    Ascites: None.    Aorta and IVC: Visualized portions are within normal limits.    IMPRESSION:     Cholelithiasis without evidence for acute cholecystitis.    Splenomegaly.    < end of copied text >          CT Chest No Cont (09.10.19 @ 09:26) >  EXAM:  CT CHEST                            PROCEDURE DATE:  09/10/2019          INTERPRETATION:  CLINICAL INDICATION: 54 years  Female with TO EVALUATE   RIGHT LUNG LESIONS  AS ABOVE. Pneumonia.    COMPARISON: 9/2/2019    PROCEDURE:   CT of the Chest was performed without intravenous contrast.  Sagittal and coronal reformats were performed.      FINDINGS:    LUNGS AND AIRWAYS: Persistent dense consolidation involving most of the   right middle lobe extending into the right lower lobe. Right lower lobe   air bronchograms present. Diffuse thickening of the bronchovascular   interstitium within the right lung. Right lower lobe dependent   atelectasis. Mild discoid atelectasis in the left lower lobe. Mild   bilateral upper lobe emphysema.    PLEURA: Trace right pleural effusion. No left pleural effusion.    MEDIASTINUM AND GÓMEZ: Right upper paratracheal lymph node measuring 7 mm   in short axis (3:37). Right upper paratracheal lymph node measuring 9 mm   in short axis (3:49). Right lower paratracheal lymph node measuring 1 cm   in short axis (3:58). Enlarged subcarinal lymph node measuring 2.2 cm in   short axis (3:71). Extension of right lung consolidation into the right   hilum. Limited evaluation for vascular invasion or constrictionsecondary   to lack of IV contrast. Complete opacification of the right middle lobe   bronchi.    VESSELS: No evidence of aortic aneurysm. Please see Steinmann hilar   findings.    HEART: Heart size is normal. Trace pericardial effusion.    CHEST WALL AND LOWER NECK: Within normal limits.    VISUALIZED UPPER ABDOMEN: Suspected splenomegaly. The spleen is   incompletely imaged. The adrenal glands are incompletely imaged.    BONES: Mild cervical and thoracic degenerative changes.    IMPRESSION:     Dense consolidation in the right middle and lower lobe with diffuse right   lung bronchovascular thickening as well as mediastinal adenopathy.   Complete opacification of the right middle lobe bronchi. Findings   concerning for underlying neoplasm.    Extension of right lung consolidation into the right hilum. Limited   evaluation for vascular invasion or constriction secondary to lack of IV   contrast.    < end of copied text >        Imaging Personally Reviewed:  YES/NO    Consultant(s) Notes Reviewed:   YES/ No    Care Discussed with Consultants :     Plan of care was discussed with patient and /or primary care giver; all questions and concerns were addressed and care was aligned with patient's wishes.

## 2019-09-19 LAB
ANION GAP SERPL CALC-SCNC: 5 MMOL/L — SIGNIFICANT CHANGE UP (ref 5–17)
BUN SERPL-MCNC: 4 MG/DL — LOW (ref 7–18)
CALCIUM SERPL-MCNC: 8.4 MG/DL — SIGNIFICANT CHANGE UP (ref 8.4–10.5)
CHLORIDE SERPL-SCNC: 104 MMOL/L — SIGNIFICANT CHANGE UP (ref 96–108)
CO2 SERPL-SCNC: 27 MMOL/L — SIGNIFICANT CHANGE UP (ref 22–31)
CREAT SERPL-MCNC: 0.61 MG/DL — SIGNIFICANT CHANGE UP (ref 0.5–1.3)
GLUCOSE SERPL-MCNC: 88 MG/DL — SIGNIFICANT CHANGE UP (ref 70–99)
HCT VFR BLD CALC: 22.6 % — LOW (ref 34.5–45)
HGB BLD-MCNC: 7.2 G/DL — LOW (ref 11.5–15.5)
LDH SERPL L TO P-CCNC: 100 U/L — LOW (ref 120–225)
MCHC RBC-ENTMCNC: 30.4 PG — SIGNIFICANT CHANGE UP (ref 27–34)
MCHC RBC-ENTMCNC: 31.9 GM/DL — LOW (ref 32–36)
MCV RBC AUTO: 95.4 FL — SIGNIFICANT CHANGE UP (ref 80–100)
NON-GYNECOLOGICAL CYTOLOGY STUDY: SIGNIFICANT CHANGE UP
NRBC # BLD: 0 /100 WBCS — SIGNIFICANT CHANGE UP (ref 0–0)
PLATELET # BLD AUTO: 320 K/UL — SIGNIFICANT CHANGE UP (ref 150–400)
POTASSIUM SERPL-MCNC: 3.8 MMOL/L — SIGNIFICANT CHANGE UP (ref 3.5–5.3)
POTASSIUM SERPL-SCNC: 3.8 MMOL/L — SIGNIFICANT CHANGE UP (ref 3.5–5.3)
PROT ?TM UR-MCNC: 42 MG/DL — HIGH (ref 0–12)
RBC # BLD: 2.37 M/UL — LOW (ref 3.8–5.2)
RBC # BLD: 2.63 M/UL — LOW (ref 3.8–5.2)
RBC # FLD: 15 % — HIGH (ref 10.3–14.5)
RETICS #: 25.5 K/UL — SIGNIFICANT CHANGE UP (ref 25–125)
RETICS/RBC NFR: 1 % — SIGNIFICANT CHANGE UP (ref 0.5–2.5)
SODIUM SERPL-SCNC: 136 MMOL/L — SIGNIFICANT CHANGE UP (ref 135–145)
URATE SERPL-MCNC: 1.8 MG/DL — LOW (ref 2.5–7)
WBC # BLD: 5 K/UL — SIGNIFICANT CHANGE UP (ref 3.8–10.5)
WBC # FLD AUTO: 5 K/UL — SIGNIFICANT CHANGE UP (ref 3.8–10.5)

## 2019-09-19 RX ORDER — TRAMADOL HYDROCHLORIDE 50 MG/1
50 TABLET ORAL EVERY 8 HOURS
Refills: 0 | Status: DISCONTINUED | OUTPATIENT
Start: 2019-09-19 | End: 2019-09-24

## 2019-09-19 RX ADMIN — Medication 10 MILLILITER(S): at 23:30

## 2019-09-19 RX ADMIN — Medication 3 MILLILITER(S): at 15:50

## 2019-09-19 RX ADMIN — TRAMADOL HYDROCHLORIDE 50 MILLIGRAM(S): 50 TABLET ORAL at 10:20

## 2019-09-19 RX ADMIN — TRAMADOL HYDROCHLORIDE 50 MILLIGRAM(S): 50 TABLET ORAL at 18:53

## 2019-09-19 RX ADMIN — TRAMADOL HYDROCHLORIDE 50 MILLIGRAM(S): 50 TABLET ORAL at 09:50

## 2019-09-19 RX ADMIN — Medication 1 PATCH: at 10:21

## 2019-09-19 RX ADMIN — LORATADINE 10 MILLIGRAM(S): 10 TABLET ORAL at 11:03

## 2019-09-19 RX ADMIN — Medication 10 MILLILITER(S): at 02:00

## 2019-09-19 RX ADMIN — Medication 100 MILLIGRAM(S): at 11:04

## 2019-09-19 RX ADMIN — TRAMADOL HYDROCHLORIDE 50 MILLIGRAM(S): 50 TABLET ORAL at 01:47

## 2019-09-19 RX ADMIN — POLYETHYLENE GLYCOL 3350 17 GRAM(S): 17 POWDER, FOR SOLUTION ORAL at 22:00

## 2019-09-19 RX ADMIN — TRAMADOL HYDROCHLORIDE 50 MILLIGRAM(S): 50 TABLET ORAL at 19:14

## 2019-09-19 RX ADMIN — Medication 650 MILLIGRAM(S): at 05:48

## 2019-09-19 RX ADMIN — Medication 1 PATCH: at 11:09

## 2019-09-19 RX ADMIN — Medication 3 MILLILITER(S): at 20:19

## 2019-09-19 RX ADMIN — Medication 1000 UNIT(S): at 11:03

## 2019-09-19 RX ADMIN — METHADONE HYDROCHLORIDE 55 MILLIGRAM(S): 40 TABLET ORAL at 11:03

## 2019-09-19 RX ADMIN — Medication 10 MILLILITER(S): at 15:27

## 2019-09-19 RX ADMIN — Medication 10 MILLILITER(S): at 09:24

## 2019-09-19 RX ADMIN — SENNA PLUS 2 TABLET(S): 8.6 TABLET ORAL at 22:00

## 2019-09-19 RX ADMIN — PANTOPRAZOLE SODIUM 40 MILLIGRAM(S): 20 TABLET, DELAYED RELEASE ORAL at 05:48

## 2019-09-19 RX ADMIN — TRAMADOL HYDROCHLORIDE 50 MILLIGRAM(S): 50 TABLET ORAL at 02:20

## 2019-09-19 RX ADMIN — Medication 3 MILLILITER(S): at 02:14

## 2019-09-19 RX ADMIN — Medication 1 PATCH: at 11:03

## 2019-09-19 RX ADMIN — Medication 10 MILLILITER(S): at 19:40

## 2019-09-19 RX ADMIN — Medication 650 MILLIGRAM(S): at 06:20

## 2019-09-19 RX ADMIN — Medication 1 PATCH: at 19:40

## 2019-09-19 RX ADMIN — Medication 1 MILLIGRAM(S): at 11:04

## 2019-09-19 NOTE — PROGRESS NOTE ADULT - SUBJECTIVE AND OBJECTIVE BOX
feels doopy today  had transfusion 2 days ago but dropped to 7.2 again today  no BM for 2 days.  no abd pain, fever, chills, sob.     MEDICATIONS  (STANDING):  ALBUTerol/ipratropium for Nebulization 3 milliLiter(s) Nebulizer every 6 hours  cholecalciferol 1000 Unit(s) Oral daily  docusate sodium 100 milliGRAM(s) Oral daily  folic acid 1 milliGRAM(s) Oral daily  influenza   Vaccine 0.5 milliLiter(s) IntraMuscular once  loratadine 10 milliGRAM(s) Oral daily  methadone    Tablet 55 milliGRAM(s) Oral daily  nicotine - 21 mG/24Hr(s) Patch 1 patch Transdermal daily  pantoprazole    Tablet 40 milliGRAM(s) Oral before breakfast  senna 2 Tablet(s) Oral at bedtime    MEDICATIONS  (PRN):  acetaminophen   Tablet .. 650 milliGRAM(s) Oral every 6 hours PRN Temp greater or equal to 38C (100.4F), Mild Pain (1 - 3)  ALBUTerol/ipratropium for Nebulization 3 milliLiter(s) Nebulizer every 8 hours PRN Bronchospasm  aluminum hydroxide/magnesium hydroxide/simethicone Suspension 30 milliLiter(s) Oral every 6 hours PRN Dyspepsia  guaiFENesin/dextromethorphan  Syrup 10 milliLiter(s) Oral every 4 hours PRN Cough  polyethylene glycol 3350 17 Gram(s) Oral daily PRN Constipation  traMADol 50 milliGRAM(s) Oral every 8 hours PRN Severe Pain (7 - 10)      Allergies    No Known Allergies    Intolerances        Vital Signs Last 24 Hrs  T(C): 37.4 (19 Sep 2019 20:47), Max: 37.7 (19 Sep 2019 16:02)  T(F): 99.3 (19 Sep 2019 20:47), Max: 99.9 (19 Sep 2019 16:02)  HR: 97 (19 Sep 2019 20:47) (74 - 97)  BP: 106/53 (19 Sep 2019 20:47) (88/48 - 106/53)  BP(mean): --  RR: 16 (19 Sep 2019 20:47) (16 - 17)  SpO2: 95% (19 Sep 2019 20:47) (95% - 100%)    PHYSICAL EXAM  General: adult in NAD  HEENT: clear oropharynx, anicteric sclera, pink conjunctiva  Neck: supple  CV: normal S1/S2 with no murmur rubs or gallops  Lungs: positive air movement b/l ant lungs,clear to auscultation, no wheezes, no rales  Abdomen: soft non-tender non-distended, no hepatosplenomegaly  Ext: no clubbing cyanosis or edema  Skin: no rashes and no petechiae  Neuro: alert and oriented X 4, no focal deficits  LABS:                          7.2    5.00  )-----------( 320      ( 19 Sep 2019 07:06 )             22.6         Mean Cell Volume : 95.4 fl  Mean Cell Hemoglobin : 30.4 pg  Mean Cell Hemoglobin Concentration : 31.9 gm/dL  Auto Neutrophil # : x  Auto Lymphocyte # : x  Auto Monocyte # : x  Auto Eosinophil # : x  Auto Basophil # : x  Auto Neutrophil % : x  Auto Lymphocyte % : x  Auto Monocyte % : x  Auto Eosinophil % : x  Auto Basophil % : x    Serial CBC  Hematocrit --  Hemoglobin --  Plat --  RBC 2.63  WBC --  Serial CBC  Hematocrit 22.6  Hemoglobin 7.2  Plat 320  RBC 2.37  WBC 5.00  Serial CBC  Hematocrit 27.2  Hemoglobin 8.7  Plat 419  RBC 2.88  WBC 7.04  Serial CBC  Hematocrit 26.1  Hemoglobin 8.5  Plat 390  RBC 2.78  WBC 7.58  Serial CBC  Hematocrit 23.6  Hemoglobin 7.5  Plat 342  RBC 2.42  WBC 7.13  Serial CBC  Hematocrit 23.4  Hemoglobin 7.2  Plat 335  RBC 2.37  WBC 9.33    09-19    136  |  104  |  4<L>  ----------------------------<  88  3.8   |  27  |  0.61    Ca    8.4      19 Sep 2019 07:06    TPro  7.2  /  Alb  1.6<L>  /  TBili  0.5  /  DBili  x   /  AST  11  /  ALT  15  /  AlkPhos  134<H>  09-18      PT/INR - ( 18 Sep 2019 06:21 )   PT: 15.5 sec;   INR: 1.38 ratio         PTT - ( 18 Sep 2019 06:21 )  PTT:62.5 sec    Reticulocyte Percent: 1.0 % (09-19 @ 16:00)            BLOOD SMEAR INTERPRETATION:       RADIOLOGY & ADDITIONAL STUDIES:

## 2019-09-19 NOTE — PROGRESS NOTE ADULT - SUBJECTIVE AND OBJECTIVE BOX
Time of Visit:  Patient seen and examined.     MEDICATIONS  (STANDING):  ALBUTerol/ipratropium for Nebulization 3 milliLiter(s) Nebulizer every 6 hours  cholecalciferol 1000 Unit(s) Oral daily  docusate sodium 100 milliGRAM(s) Oral daily  folic acid 1 milliGRAM(s) Oral daily  influenza   Vaccine 0.5 milliLiter(s) IntraMuscular once  loratadine 10 milliGRAM(s) Oral daily  methadone    Tablet 55 milliGRAM(s) Oral daily  nicotine - 21 mG/24Hr(s) Patch 1 patch Transdermal daily  pantoprazole    Tablet 40 milliGRAM(s) Oral before breakfast  piperacillin/tazobactam IVPB. 3.375 Gram(s) IV Intermittent once  senna 2 Tablet(s) Oral at bedtime      MEDICATIONS  (PRN):  acetaminophen   Tablet .. 650 milliGRAM(s) Oral every 6 hours PRN Temp greater or equal to 38C (100.4F), Mild Pain (1 - 3)  ALBUTerol/ipratropium for Nebulization 3 milliLiter(s) Nebulizer every 8 hours PRN Bronchospasm  aluminum hydroxide/magnesium hydroxide/simethicone Suspension 30 milliLiter(s) Oral every 6 hours PRN Dyspepsia  guaiFENesin/dextromethorphan  Syrup 10 milliLiter(s) Oral every 4 hours PRN Cough  polyethylene glycol 3350 17 Gram(s) Oral daily PRN Constipation  traMADol 50 milliGRAM(s) Oral every 8 hours PRN Severe Pain (7 - 10)       Medications up to date at time of exam.    ROS; No fever, chills, cough, congestion, hypoxia.   PHYSICAL EXAMINATION:    Vital Signs Last 24 Hrs  T(C): 36.7 (19 Sep 2019 08:17), Max: 37 (18 Sep 2019 16:16)  T(F): 98 (19 Sep 2019 08:17), Max: 98.6 (18 Sep 2019 16:16)  HR: 76 (19 Sep 2019 08:17) (76 - 92)  BP: 88/48 (19 Sep 2019 08:17) (88/48 - 93/51)  BP(mean): --  RR: 16 (19 Sep 2019 08:17) (16 - 17)  SpO2: 96% (19 Sep 2019 08:17) (96% - 97%)   (if applicable)    General: Alert and oriented. No acute distress. Able to answer question with no SOB.     HEENT: Head is normocephalic and atraumatic. No nasal tenderness. Extraocular muscles are intact. Moist mucosa.     NECK: Supple, no palpable adenopathy.    LUNGS: Clear to auscultation B/L. No wheezing, rales, or rhonchi. No use of accessory muscle.     HEART:  S1 S2 regular. No click/ rub.     ABDOMEN: Soft, nontender, and nondistended.  No abdominal guarding. Active bowel sounds.     EXTREMITIES: Without any cyanosis, clubbing, rash, lesions or edema.    NEUROLOGIC: Awake, alert, oriented.     SKIN: Warm and moist. Non diaphoretic.     LABS:                        7.2    5.00  )-----------( 320      ( 19 Sep 2019 07:06 )             22.6     09-19    136  |  104  |  4<L>  ----------------------------<  88  3.8   |  27  |  0.61    Ca    8.4      19 Sep 2019 07:06    TPro  7.2  /  Alb  1.6<L>  /  TBili  0.5  /  DBili  x   /  AST  11  /  ALT  15  /  AlkPhos  134<H>  09-18    PT/INR - ( 18 Sep 2019 06:21 )   PT: 15.5 sec;   INR: 1.38 ratio         PTT - ( 18 Sep 2019 06:21 )  PTT:62.5 sec          RADIOLOGY & ADDITIONAL STUDIES:  EKG:   CT chest: < from: CT Chest No Cont (09.10.19 @ 09:26) >    EXAM:  CT CHEST                            PROCEDURE DATE:  09/10/2019          INTERPRETATION:  CLINICAL INDICATION: 54 years  Female with TO EVALUATE   RIGHT LUNG LESIONS  AS ABOVE. Pneumonia.    COMPARISON: 9/2/2019    PROCEDURE:   CT of the Chest was performed without intravenous contrast.  Sagittal and coronal reformats were performed.      FINDINGS:    LUNGS AND AIRWAYS: Persistent dense consolidation involving most of the   right middle lobe extending into the right lower lobe. Right lower lobe   air bronchograms present. Diffuse thickening of the bronchovascular   interstitium within the right lung. Right lower lobe dependent   atelectasis. Mild discoid atelectasis in the left lower lobe. Mild   bilateral upper lobe emphysema.    PLEURA: Trace right pleural effusion. No left pleural effusion.    MEDIASTINUM AND GÓMEZ: Right upper paratracheal lymph node measuring 7 mm   in short axis (3:37). Right upper paratracheal lymph node measuring 9 mm   in short axis (3:49). Right lower paratracheal lymph node measuring 1 cm   in short axis (3:58). Enlarged subcarinal lymph node measuring 2.2 cm in   short axis (3:71). Extension of right lung consolidation into the right   hilum. Limited evaluation for vascular invasion or constrictionsecondary   to lack of IV contrast. Complete opacification of the right middle lobe   bronchi.    VESSELS: No evidence of aortic aneurysm. Please see Steinmann hilar   findings.    HEART: Heart size is normal. Trace pericardial effusion.    CHEST WALL AND LOWER NECK: Within normal limits.    VISUALIZED UPPER ABDOMEN: Suspected splenomegaly. The spleen is   incompletely imaged. The adrenal glands are incompletely imaged.    BONES: Mild cervical and thoracic degenerative changes.    IMPRESSION:     Dense consolidation in the right middle and lower lobe with diffuse right   lung bronchovascular thickening as well as mediastinal adenopathy.   Complete opacification of the right middle lobe bronchi. Findings   concerning for underlying neoplasm.    Extension of right lung consolidation into the right hilum. Limited   evaluation for vascular invasion or constriction secondary to lack of IV   contrast.    Mild emphysema.      IMPRESSION: 54y Female PAST MEDICAL & SURGICAL HISTORY:  Asthma  No significant past surgical history     Impression; 53 Y/O Female presented with SOB, Cough, Chest Pain. CT chest show RML bronchus obstruction with mediastinal adenopathy may be due to pan vs malignancy. Repeated CT chest shows no change in lung consolidatio vs mass. +klebsiella pneum bacteremia 9/3 repeat BC 9/5 neg. HIV negative. IR refused bx . s/p Bronchoscopy 09-13-19 , no Biopsy done and EBUS was not attempted due to coagulopathy. Bronch culture neg. cytology including Negative AFB.     Suggestion;  O2 saturation 97% room air. No need for outpatient Oxygen supplementation due to been saturating good room air.    Reinforced the importance of smoking cessation and Daily compliance to Daily medications.   Continue with antibiotics as per ID recommendations.   Continue Duoneb Q 6 hours. Discontinue PRN DuoNeb Q 8 hours.   Monitoring of Anemia, Hgb/Hct 7.2/22.6.   Repeat CT Chest in 4-6 weeks. Time of Visit:  Patient seen and examined.     MEDICATIONS  (STANDING):  ALBUTerol/ipratropium for Nebulization 3 milliLiter(s) Nebulizer every 6 hours  cholecalciferol 1000 Unit(s) Oral daily  docusate sodium 100 milliGRAM(s) Oral daily  folic acid 1 milliGRAM(s) Oral daily  influenza   Vaccine 0.5 milliLiter(s) IntraMuscular once  loratadine 10 milliGRAM(s) Oral daily  methadone    Tablet 55 milliGRAM(s) Oral daily  nicotine - 21 mG/24Hr(s) Patch 1 patch Transdermal daily  pantoprazole    Tablet 40 milliGRAM(s) Oral before breakfast  piperacillin/tazobactam IVPB. 3.375 Gram(s) IV Intermittent once  senna 2 Tablet(s) Oral at bedtime      MEDICATIONS  (PRN):  acetaminophen   Tablet .. 650 milliGRAM(s) Oral every 6 hours PRN Temp greater or equal to 38C (100.4F), Mild Pain (1 - 3)  ALBUTerol/ipratropium for Nebulization 3 milliLiter(s) Nebulizer every 8 hours PRN Bronchospasm  aluminum hydroxide/magnesium hydroxide/simethicone Suspension 30 milliLiter(s) Oral every 6 hours PRN Dyspepsia  guaiFENesin/dextromethorphan  Syrup 10 milliLiter(s) Oral every 4 hours PRN Cough  polyethylene glycol 3350 17 Gram(s) Oral daily PRN Constipation  traMADol 50 milliGRAM(s) Oral every 8 hours PRN Severe Pain (7 - 10)       Medications up to date at time of exam.    ROS; No fever, chills, cough, congestion, hypoxia.   PHYSICAL EXAMINATION:    Vital Signs Last 24 Hrs  T(C): 36.7 (19 Sep 2019 08:17), Max: 37 (18 Sep 2019 16:16)  T(F): 98 (19 Sep 2019 08:17), Max: 98.6 (18 Sep 2019 16:16)  HR: 76 (19 Sep 2019 08:17) (76 - 92)  BP: 88/48 (19 Sep 2019 08:17) (88/48 - 93/51)  BP(mean): --  RR: 16 (19 Sep 2019 08:17) (16 - 17)  SpO2: 96% (19 Sep 2019 08:17) (96% - 97%)   (if applicable)    General: Alert and oriented. No acute distress. Able to answer question with no SOB.     HEENT: Head is normocephalic and atraumatic. No nasal tenderness. Extraocular muscles are intact. Moist mucosa.     NECK: Supple, no palpable adenopathy.    LUNGS: Clear to auscultation B/L. No wheezing, rales, or rhonchi. No use of accessory muscle.     HEART:  S1 S2 regular. No click/ rub.     ABDOMEN: Soft, nontender, and nondistended.  No abdominal guarding. Active bowel sounds.     EXTREMITIES: Without any cyanosis, clubbing, rash, lesions or edema.    NEUROLOGIC: Awake, alert, oriented.     SKIN: Warm and moist. Non diaphoretic.     LABS:                        7.2    5.00  )-----------( 320      ( 19 Sep 2019 07:06 )             22.6     09-19    136  |  104  |  4<L>  ----------------------------<  88  3.8   |  27  |  0.61    Ca    8.4      19 Sep 2019 07:06    TPro  7.2  /  Alb  1.6<L>  /  TBili  0.5  /  DBili  x   /  AST  11  /  ALT  15  /  AlkPhos  134<H>  09-18    PT/INR - ( 18 Sep 2019 06:21 )   PT: 15.5 sec;   INR: 1.38 ratio         PTT - ( 18 Sep 2019 06:21 )  PTT:62.5 sec          RADIOLOGY & ADDITIONAL STUDIES:  EKG:   CT chest: < from: CT Chest No Cont (09.10.19 @ 09:26) >    EXAM:  CT CHEST                            PROCEDURE DATE:  09/10/2019          INTERPRETATION:  CLINICAL INDICATION: 54 years  Female with TO EVALUATE   RIGHT LUNG LESIONS  AS ABOVE. Pneumonia.    COMPARISON: 9/2/2019    PROCEDURE:   CT of the Chest was performed without intravenous contrast.  Sagittal and coronal reformats were performed.      FINDINGS:    LUNGS AND AIRWAYS: Persistent dense consolidation involving most of the   right middle lobe extending into the right lower lobe. Right lower lobe   air bronchograms present. Diffuse thickening of the bronchovascular   interstitium within the right lung. Right lower lobe dependent   atelectasis. Mild discoid atelectasis in the left lower lobe. Mild   bilateral upper lobe emphysema.    PLEURA: Trace right pleural effusion. No left pleural effusion.    MEDIASTINUM AND GÓMEZ: Right upper paratracheal lymph node measuring 7 mm   in short axis (3:37). Right upper paratracheal lymph node measuring 9 mm   in short axis (3:49). Right lower paratracheal lymph node measuring 1 cm   in short axis (3:58). Enlarged subcarinal lymph node measuring 2.2 cm in   short axis (3:71). Extension of right lung consolidation into the right   hilum. Limited evaluation for vascular invasion or constrictionsecondary   to lack of IV contrast. Complete opacification of the right middle lobe   bronchi.    VESSELS: No evidence of aortic aneurysm. Please see Steinmann hilar   findings.    HEART: Heart size is normal. Trace pericardial effusion.    CHEST WALL AND LOWER NECK: Within normal limits.    VISUALIZED UPPER ABDOMEN: Suspected splenomegaly. The spleen is   incompletely imaged. The adrenal glands are incompletely imaged.    BONES: Mild cervical and thoracic degenerative changes.    IMPRESSION:     Dense consolidation in the right middle and lower lobe with diffuse right   lung bronchovascular thickening as well as mediastinal adenopathy.   Complete opacification of the right middle lobe bronchi. Findings   concerning for underlying neoplasm.    Extension of right lung consolidation into the right hilum. Limited   evaluation for vascular invasion or constriction secondary to lack of IV   contrast.    Mild emphysema.      IMPRESSION: 54y Female PAST MEDICAL & SURGICAL HISTORY:  Asthma  No significant past surgical history     Impression; 53 Y/O Female presented with SOB, Cough, Chest Pain. CT chest show RML bronchus obstruction with mediastinal adenopathy may be due to pan vs malignancy. Repeated CT chest shows no change in lung consolidatio vs mass. +klebsiella pneum bacteremia 9/3 repeat BC 9/5 neg. HIV negative. IR refused bx . s/p Bronchoscopy 09-13-19 , no Biopsy done and EBUS was not attempted due to coagulopathy. Bronch culture neg. cytology including Negative AFB.  completed course of antibx    Suggestion;  O2 saturation 97% room air. No need for outpatient Oxygen supplementation due to been saturating good room air.    Reinforced the importance of smoking cessation and Daily compliance to Daily medications.   Continue with antibiotics as per ID recommendations.   Continue Duoneb Q 6 hours. Discontinue PRN DuoNeb Q 8 hours.   Monitoring of Anemia, Hgb/Hct 7.2/22.6.   Repeat CT Chest in 4-6 weeks.  out pat pulmo f/u

## 2019-09-19 NOTE — PROGRESS NOTE ADULT - PROBLEM SELECTOR PLAN 2
due to infection  no evidence of iron def or bleeding  the CBC today showed WBC, RBC, platelet are are lower than yesterday, ?diluted blood sample or lab error.  ret, ldh are normal  await haptoglobin.  I doubt it is hemolysis

## 2019-09-19 NOTE — PROGRESS NOTE ADULT - SUBJECTIVE AND OBJECTIVE BOX
NP Note discussed with Primary Attending    Patient is a 54y old  Female who presents with a chief complaint of SOB, Right sided CP, cough since yesterday morning (19 Sep 2019 13:20)      INTERVAL HPI/OVERNIGHT EVENTS: no new complaints, no abd pain, nausea or vomiting, no fever or chills, cp or sob.    Labs reviewed and noted drop in H&H from previous lab despite a relatively neg scope w/o a clear source of bleeding.      MEDICATIONS  (STANDING):  ALBUTerol/ipratropium for Nebulization 3 milliLiter(s) Nebulizer every 6 hours  cholecalciferol 1000 Unit(s) Oral daily  docusate sodium 100 milliGRAM(s) Oral daily  folic acid 1 milliGRAM(s) Oral daily  influenza   Vaccine 0.5 milliLiter(s) IntraMuscular once  loratadine 10 milliGRAM(s) Oral daily  methadone    Tablet 55 milliGRAM(s) Oral daily  nicotine - 21 mG/24Hr(s) Patch 1 patch Transdermal daily  pantoprazole    Tablet 40 milliGRAM(s) Oral before breakfast  senna 2 Tablet(s) Oral at bedtime    MEDICATIONS  (PRN):  acetaminophen   Tablet .. 650 milliGRAM(s) Oral every 6 hours PRN Temp greater or equal to 38C (100.4F), Mild Pain (1 - 3)  ALBUTerol/ipratropium for Nebulization 3 milliLiter(s) Nebulizer every 8 hours PRN Bronchospasm  aluminum hydroxide/magnesium hydroxide/simethicone Suspension 30 milliLiter(s) Oral every 6 hours PRN Dyspepsia  guaiFENesin/dextromethorphan  Syrup 10 milliLiter(s) Oral every 4 hours PRN Cough  polyethylene glycol 3350 17 Gram(s) Oral daily PRN Constipation  traMADol 50 milliGRAM(s) Oral every 8 hours PRN Severe Pain (7 - 10)      __________________________________________________  REVIEW OF SYSTEMS:    CONSTITUTIONAL: No fever,   EYES: no acute visual disturbances  NECK: No pain or stiffness  RESPIRATORY: No cough; No shortness of breath  CARDIOVASCULAR: No chest pain, no palpitations  GASTROINTESTINAL: No pain. No nausea or vomiting; No diarrhea   NEUROLOGICAL: No headache or numbness, no tremors  MUSCULOSKELETAL: No joint pain, no muscle pain  GENITOURINARY: no dysuria, no frequency, no hesitancy  PSYCHIATRY: no depression , no anxiety  ALL OTHER  ROS negative        Vital Signs Last 24 Hrs  T(C): 36.7 (19 Sep 2019 08:17), Max: 37 (18 Sep 2019 16:16)  T(F): 98 (19 Sep 2019 08:17), Max: 98.6 (18 Sep 2019 16:16)  HR: 76 (19 Sep 2019 08:17) (76 - 92)  BP: 88/48 (19 Sep 2019 08:17) (88/48 - 93/51)  BP(mean): --  RR: 16 (19 Sep 2019 08:17) (16 - 17)  SpO2: 96% (19 Sep 2019 08:17) (96% - 97%)    ________________________________________________  PHYSICAL EXAM:  GENERAL: NAD  HEENT: Normocephalic;  conjunctivae and sclerae clear; moist mucous membranes;   NECK : supple  CHEST/LUNG: Rt lung field w/ crackles   HEART: S1 S2  regular; no murmurs, gallops or rubs  ABDOMEN: Soft, Nontender, Nondistended; Bowel sounds present  EXTREMITIES: no cyanosis; no edema; no calf tenderness  SKIN: warm and dry; no rash  NERVOUS SYSTEM:  Awake and alert; Oriented  to place, person and time ; no new deficits    _________________________________________________  LABS:                        7.2    5.00  )-----------( 320      ( 19 Sep 2019 07:06 )             22.6     09-19    136  |  104  |  4<L>  ----------------------------<  88  3.8   |  27  |  0.61    Ca    8.4      19 Sep 2019 07:06    TPro  7.2  /  Alb  1.6<L>  /  TBili  0.5  /  DBili  x   /  AST  11  /  ALT  15  /  AlkPhos  134<H>  09-18    PT/INR - ( 18 Sep 2019 06:21 )   PT: 15.5 sec;   INR: 1.38 ratio         PTT - ( 18 Sep 2019 06:21 )  PTT:62.5 sec    CAPILLARY BLOOD GLUCOSE            RADIOLOGY & ADDITIONAL TESTS:    Imaging  Reviewed:  YES/NO    Consultant(s) Notes Reviewed:   YES/ No      Plan of care was discussed with patient and /or primary care giver; all questions and concerns were addressed     Assessment and plan:  Assessment:   53 y/o female, homeless with PMH of Asthma, who  presented with SOB, cough and right sided chest pain x 1 day. She started feeling short of breath, present at rest, not relieved with Albuterol inhaler. CXR reveals opacification/consolidation of the RLL . Admitted to medicine for pneumonia. Completed 14days of zosyn regimen. Still has cough, but no SOB or sputum reported. Afebrile.   CT chest showed right lung mass with concern for underlying neoplasm . Pt had a bronchoscopy last Friday, unable to do biopsy due to prolonged PTT .    Pt underwent an EGD and colonoscopy on 9/18- which showed a nonbleeding ulcer.  Otherwise, unremarkable study.  Pt w/ recurrent dropped in her H&H, discussed w/ Dr. Rosado, Carol Boone and Dr. Hernandez.  Pt is pending 1 unit PRBC.        GOC  : full code     9/18-Pt. underwent EGD/Colonoscopy      Problem/Plan - 1:  ·  Problem: Anemia.  Plan: Persistent but stable anemia, H/H 8.7/27.2 today  Iron def anemia vs malignancy vs GIB  -GI Dr. Hernandez  -Underwent EGD/Colonoscopy today-non bleeding ulcer, otherwise unremarkable.    -Cont Protonix  -Hem Dr. Rosado, note appreciated  -Anemia likely due to sepsis   -Cont folic acid  -f/u CBC.      Problem/Plan - 2:  ·  Problem: Lung mass.  Plan: s/p bronchoscopy on 9.13.2019  no bx at that time  rec - CT chest without contrast 6-8 weeks and f/u with Dr. Guo as outpatient.      Problem/Plan - 3:  ·  Problem: Pneumonia of right lower lobe due to infectious organism.  Plan: completed zosyn 14 days per Dr. Hopkins  monitor respiratory distress.      Problem/Plan - 4:  ·  Problem: Current smoker.  Plan: c/w Nicotine patch.      Problem/Plan - 5:  ·  Problem: Asthma.  Plan: C/w Duonebs, antitussives  improving.      Problem/Plan - 6:  Problem: Methadone maintenance therapy patient. Plan: hx of heroin abuse    current going to methadone clinic 6 times a week - c/w  methadone 55mg po daily.     Problem/Plan - 7:  ·  Problem: Severe protein-calorie malnutrition.  Plan: Albumin 1.6 today  continue with regular diet.  Add supplements.      Problem/Plan - 8:  ·  Problem: Chest wall pain.  Plan: Right side pain likely costo-chrondritis  due to pneumonia and lung mass  cardiology w/u all negative ( EKG/ Echo / stress test and CE )   c/w pain regimen.      Problem/Plan - 9:  ·  Problem: Need for prophylactic measure.  Plan: d/tabitha heparin due to dropped in H/H  encouraged ambulation  PPI for GI ppx.      Problem/Plan - 10:  Problem: Discharge planning issues. Plan; per SW pt will be dc/ed to inpatient substance abuse rehab once medically stable. NP Note discussed with Primary Attending    Patient is a 54y old  Female who presents with a chief complaint of SOB, Right sided CP, cough since yesterday morning (19 Sep 2019 13:20)      INTERVAL HPI/OVERNIGHT EVENTS: no new complaints, no abd pain, nausea or vomiting, no fever or chills, cp or sob.    Labs reviewed and noted a drop in H&H from previous lab despite a relatively neg scope w/o a clear source of bleeding.      MEDICATIONS  (STANDING):  ALBUTerol/ipratropium for Nebulization 3 milliLiter(s) Nebulizer every 6 hours  cholecalciferol 1000 Unit(s) Oral daily  docusate sodium 100 milliGRAM(s) Oral daily  folic acid 1 milliGRAM(s) Oral daily  influenza   Vaccine 0.5 milliLiter(s) IntraMuscular once  loratadine 10 milliGRAM(s) Oral daily  methadone    Tablet 55 milliGRAM(s) Oral daily  nicotine - 21 mG/24Hr(s) Patch 1 patch Transdermal daily  pantoprazole    Tablet 40 milliGRAM(s) Oral before breakfast  senna 2 Tablet(s) Oral at bedtime    MEDICATIONS  (PRN):  acetaminophen   Tablet .. 650 milliGRAM(s) Oral every 6 hours PRN Temp greater or equal to 38C (100.4F), Mild Pain (1 - 3)  ALBUTerol/ipratropium for Nebulization 3 milliLiter(s) Nebulizer every 8 hours PRN Bronchospasm  aluminum hydroxide/magnesium hydroxide/simethicone Suspension 30 milliLiter(s) Oral every 6 hours PRN Dyspepsia  guaiFENesin/dextromethorphan  Syrup 10 milliLiter(s) Oral every 4 hours PRN Cough  polyethylene glycol 3350 17 Gram(s) Oral daily PRN Constipation  traMADol 50 milliGRAM(s) Oral every 8 hours PRN Severe Pain (7 - 10)      __________________________________________________  REVIEW OF SYSTEMS:    CONSTITUTIONAL: No fever,   EYES: no acute visual disturbances  NECK: No pain or stiffness  RESPIRATORY: No cough; No shortness of breath  CARDIOVASCULAR: No chest pain, no palpitations  GASTROINTESTINAL: No pain. No nausea or vomiting; No diarrhea   NEUROLOGICAL: No headache or numbness, no tremors  MUSCULOSKELETAL: No joint pain, no muscle pain  GENITOURINARY: no dysuria, no frequency, no hesitancy  PSYCHIATRY: no depression , no anxiety  ALL OTHER  ROS negative        Vital Signs Last 24 Hrs  T(C): 36.7 (19 Sep 2019 08:17), Max: 37 (18 Sep 2019 16:16)  T(F): 98 (19 Sep 2019 08:17), Max: 98.6 (18 Sep 2019 16:16)  HR: 76 (19 Sep 2019 08:17) (76 - 92)  BP: 88/48 (19 Sep 2019 08:17) (88/48 - 93/51)  BP(mean): --  RR: 16 (19 Sep 2019 08:17) (16 - 17)  SpO2: 96% (19 Sep 2019 08:17) (96% - 97%)    ________________________________________________  PHYSICAL EXAM:  GENERAL: NAD  HEENT: Normocephalic;  conjunctivae and sclerae clear; moist mucous membranes;   NECK : supple  CHEST/LUNG: Rt lung field w/ crackles   HEART: S1 S2  regular; no murmurs, gallops or rubs  ABDOMEN: Soft, Nontender, Nondistended; Bowel sounds present  EXTREMITIES: no cyanosis; no edema; no calf tenderness  SKIN: warm and dry; no rash  NERVOUS SYSTEM:  Awake and alert; Oriented  to place, person and time ; no new deficits    _________________________________________________  LABS:                        7.2    5.00  )-----------( 320      ( 19 Sep 2019 07:06 )             22.6     09-19    136  |  104  |  4<L>  ----------------------------<  88  3.8   |  27  |  0.61    Ca    8.4      19 Sep 2019 07:06    TPro  7.2  /  Alb  1.6<L>  /  TBili  0.5  /  DBili  x   /  AST  11  /  ALT  15  /  AlkPhos  134<H>  09-18    PT/INR - ( 18 Sep 2019 06:21 )   PT: 15.5 sec;   INR: 1.38 ratio         PTT - ( 18 Sep 2019 06:21 )  PTT:62.5 sec    CAPILLARY BLOOD GLUCOSE            RADIOLOGY & ADDITIONAL TESTS:    Imaging  Reviewed:  YES/NO    Consultant(s) Notes Reviewed:   YES/ No      Plan of care was discussed with patient and /or primary care giver; all questions and concerns were addressed     Assessment and plan:  Assessment:   55 y/o female, homeless with PMH of Asthma, who  presented with SOB, cough and right sided chest pain x 1 day. She started feeling short of breath, present at rest, not relieved with Albuterol inhaler. CXR reveals opacification/consolidation of the RLL . Admitted to medicine for pneumonia. Completed 14days of zosyn regimen. Still has cough, but no SOB or sputum reported. Afebrile.   CT chest showed right lung mass with concern for underlying neoplasm . Pt had a bronchoscopy last Friday, unable to do biopsy due to prolonged PTT .    Pt underwent an EGD and colonoscopy on 9/18- which showed a nonbleeding ulcer.  Otherwise, unremarkable study.  Pt w/ recurrent dropped in her H&H, discussed w/ Dr. Rosado, Carol Boone and Dr. Hernandez.  Pt is pending 1 unit PRBC. Will send hemolysis labs prior to blood transfusion.       GOC  : full code     9/18-Pt. underwent EGD/Colonoscopy      Problem/Plan - 1:  ·  Problem: Anemia.  Plan: Persistent but stable anemia, H/H 7.2/22.6 today  Iron def anemia vs malignancy vs GIB  -GI Dr. Hernandez  -Underwent EGD/Colonoscopy today-non bleeding ulcer, otherwise unremarkable.    -Cont Protonix  -Hem Dr. Rosado, note appreciated  -Anemia likely due to sepsis   -Cont folic acid  -f/u CBC.      Problem/Plan - 2:  ·  Problem: Lung mass.  Plan: s/p bronchoscopy on 9.13.2019  no bx at that time  rec - CT chest without contrast 6-8 weeks and f/u with Dr. Guo as outpatient.      Problem/Plan - 3:  ·  Problem: Pneumonia of right lower lobe due to infectious organism.  Plan: completed zosyn 14 days per Dr. Hopkins  monitor respiratory distress.  Zosyn d/c as per discussion w/ ID Dr. Hopkins.       Problem/Plan - 4:  ·  Problem: Current smoker.  Plan: c/w Nicotine patch.      Problem/Plan - 5:  ·  Problem: Asthma.  Plan: C/w Duonebs, antitussives  improving.      Problem/Plan - 6:  Problem: Methadone maintenance therapy patient. Plan: hx of heroin abuse    current going to methadone clinic 6 times a week - c/w  methadone 55mg po daily.     Problem/Plan - 7:  ·  Problem: Severe protein-calorie malnutrition.  Plan: Albumin 1.6 today  continue with regular diet.  Add supplements.      Problem/Plan - 8:  ·  Problem: Chest wall pain.  Plan: Right side pain likely costo-chrondritis  due to pneumonia and lung mass  cardiology w/u all negative ( EKG/ Echo / stress test and CE )   c/w pain regimen.      Problem/Plan - 9:  ·  Problem: Need for prophylactic measure.  Plan: d/tabitha heparin due to dropped in H/H  encouraged ambulation  PPI for GI ppx.      Problem/Plan - 10:  Problem: Discharge planning issues. Plan; per [facility] pt will be dc/ed to inpatient substance abuse rehab once medically stable.

## 2019-09-19 NOTE — PROGRESS NOTE ADULT - ASSESSMENT
54 year old female with persistent anemia.       Plan:  EGD and Colonoscopy unremarkable   Anemia likely from other etiology than GI tract. Pending biopsy.   Will continue to follow   Advanced care planning was discussed with patient and family.  Advanced care planning forms were reviewed and discussed.  Risks, benefits and alternatives of gastroenterologic procedures were discussed in detail and all questions were answered.

## 2019-09-20 LAB
ANION GAP SERPL CALC-SCNC: 6 MMOL/L — SIGNIFICANT CHANGE UP (ref 5–17)
BLD GP AB SCN SERPL QL: SIGNIFICANT CHANGE UP
BUN SERPL-MCNC: 5 MG/DL — LOW (ref 7–18)
CALCIUM SERPL-MCNC: 8.8 MG/DL — SIGNIFICANT CHANGE UP (ref 8.4–10.5)
CHLORIDE SERPL-SCNC: 102 MMOL/L — SIGNIFICANT CHANGE UP (ref 96–108)
CO2 SERPL-SCNC: 28 MMOL/L — SIGNIFICANT CHANGE UP (ref 22–31)
CREAT SERPL-MCNC: 0.65 MG/DL — SIGNIFICANT CHANGE UP (ref 0.5–1.3)
GLUCOSE SERPL-MCNC: 73 MG/DL — SIGNIFICANT CHANGE UP (ref 70–99)
HAPTOGLOB SERPL-MCNC: 416 MG/DL — HIGH (ref 34–200)
HCT VFR BLD CALC: 29 % — LOW (ref 34.5–45)
HGB BLD-MCNC: 9.3 G/DL — LOW (ref 11.5–15.5)
INTERPRETATION SERPL IFE-IMP: SIGNIFICANT CHANGE UP
MAGNESIUM SERPL-MCNC: 1.9 MG/DL — SIGNIFICANT CHANGE UP (ref 1.6–2.6)
MCHC RBC-ENTMCNC: 30.3 PG — SIGNIFICANT CHANGE UP (ref 27–34)
MCHC RBC-ENTMCNC: 32.1 GM/DL — SIGNIFICANT CHANGE UP (ref 32–36)
MCV RBC AUTO: 94.5 FL — SIGNIFICANT CHANGE UP (ref 80–100)
NRBC # BLD: 0 /100 WBCS — SIGNIFICANT CHANGE UP (ref 0–0)
OB PNL STL: NEGATIVE — SIGNIFICANT CHANGE UP
PHOSPHATE SERPL-MCNC: 3.9 MG/DL — SIGNIFICANT CHANGE UP (ref 2.5–4.5)
PLATELET # BLD AUTO: 340 K/UL — SIGNIFICANT CHANGE UP (ref 150–400)
POTASSIUM SERPL-MCNC: 3.8 MMOL/L — SIGNIFICANT CHANGE UP (ref 3.5–5.3)
POTASSIUM SERPL-SCNC: 3.8 MMOL/L — SIGNIFICANT CHANGE UP (ref 3.5–5.3)
RBC # BLD: 3.07 M/UL — LOW (ref 3.8–5.2)
RBC # FLD: 15 % — HIGH (ref 10.3–14.5)
SODIUM SERPL-SCNC: 136 MMOL/L — SIGNIFICANT CHANGE UP (ref 135–145)
WBC # BLD: 7.71 K/UL — SIGNIFICANT CHANGE UP (ref 3.8–10.5)
WBC # FLD AUTO: 7.71 K/UL — SIGNIFICANT CHANGE UP (ref 3.8–10.5)

## 2019-09-20 RX ADMIN — Medication 3 MILLILITER(S): at 09:49

## 2019-09-20 RX ADMIN — TRAMADOL HYDROCHLORIDE 50 MILLIGRAM(S): 50 TABLET ORAL at 20:26

## 2019-09-20 RX ADMIN — Medication 10 MILLILITER(S): at 19:56

## 2019-09-20 RX ADMIN — METHADONE HYDROCHLORIDE 55 MILLIGRAM(S): 40 TABLET ORAL at 11:04

## 2019-09-20 RX ADMIN — TRAMADOL HYDROCHLORIDE 50 MILLIGRAM(S): 50 TABLET ORAL at 12:20

## 2019-09-20 RX ADMIN — TRAMADOL HYDROCHLORIDE 50 MILLIGRAM(S): 50 TABLET ORAL at 19:56

## 2019-09-20 RX ADMIN — LORATADINE 10 MILLIGRAM(S): 10 TABLET ORAL at 11:03

## 2019-09-20 RX ADMIN — PANTOPRAZOLE SODIUM 40 MILLIGRAM(S): 20 TABLET, DELAYED RELEASE ORAL at 05:37

## 2019-09-20 RX ADMIN — SENNA PLUS 2 TABLET(S): 8.6 TABLET ORAL at 22:15

## 2019-09-20 RX ADMIN — Medication 1000 UNIT(S): at 11:03

## 2019-09-20 RX ADMIN — Medication 100 MILLIGRAM(S): at 11:03

## 2019-09-20 RX ADMIN — Medication 1 PATCH: at 11:50

## 2019-09-20 RX ADMIN — Medication 10 MILLILITER(S): at 03:43

## 2019-09-20 RX ADMIN — Medication 1 PATCH: at 11:03

## 2019-09-20 RX ADMIN — Medication 3 MILLILITER(S): at 20:30

## 2019-09-20 RX ADMIN — TRAMADOL HYDROCHLORIDE 50 MILLIGRAM(S): 50 TABLET ORAL at 03:40

## 2019-09-20 RX ADMIN — POLYETHYLENE GLYCOL 3350 17 GRAM(S): 17 POWDER, FOR SOLUTION ORAL at 14:16

## 2019-09-20 RX ADMIN — Medication 1 PATCH: at 19:45

## 2019-09-20 RX ADMIN — Medication 10 MILLILITER(S): at 14:16

## 2019-09-20 RX ADMIN — Medication 3 MILLILITER(S): at 15:18

## 2019-09-20 RX ADMIN — Medication 1 PATCH: at 08:53

## 2019-09-20 RX ADMIN — Medication 10 MILLILITER(S): at 08:53

## 2019-09-20 RX ADMIN — Medication 1 MILLIGRAM(S): at 11:03

## 2019-09-20 RX ADMIN — TRAMADOL HYDROCHLORIDE 50 MILLIGRAM(S): 50 TABLET ORAL at 11:50

## 2019-09-20 RX ADMIN — TRAMADOL HYDROCHLORIDE 50 MILLIGRAM(S): 50 TABLET ORAL at 04:10

## 2019-09-20 NOTE — PROGRESS NOTE ADULT - PROBLEM SELECTOR PLAN 1
-Persistent but stable anemia, received 1 PRBC yesterday for h/h of 7/22. Iron def anemia vs malignancy vs GIB  -h/h 9.3/29 yesterday   Iron def anemia vs malignancy vs GIB  -GI Dr. Hernandez  -Underwent EGD/Colonoscopy which showed non bleeding ulcer, otherwise unremarkable.    -Cont Protonix  -Hem Dr. Rosado  -Cont folic acid  -Mon  CBC -Persistent but stable anemia, received 1 PRBC yesterday for h/h of 7/22. Iron def anemia vs malignancy vs GIB vs Lab error, hemolysis work up was negative pre transfusion  -h/h 9.3/29 today   -GI Dr. Hernandez  -Underwent EGD/Colonoscopy which showed non bleeding ulcer, otherwise unremarkable.    -Cont Protonix  -Hem Dr. Rosado  -Cont folic acid  -Mon  CBC

## 2019-09-20 NOTE — PROGRESS NOTE ADULT - PROBLEM SELECTOR PLAN 9
DVT ppx-heparin held for IR guided biopsy  SCDs ordered
DVT ppx-heparin held for bronchoscopy  SCDs ordered
d/tabitha heparin due to dropped in H/H  encouraged ambulation  PPI for GI ppx
-dvt ppx- resume chem DVT ppx if h/h remains stable, cont SCD'd, encourage ambulation   -gi ppx- cont PPI
DVT ppx-continue Heparin.
d/tabitha heparin due to dropped in H/H  encouraged ambulation  PPI for GI ppx.
DVT ppx-continue Heparin
d/tabitha heparin due to dropped in H/H  encouraged ambulation  PPI for GI ppx.
Heparin SC resumed   PPI for GI ppx

## 2019-09-20 NOTE — PROGRESS NOTE ADULT - SUBJECTIVE AND OBJECTIVE BOX
Subjective:   Persistent anemia  Hemoglobin improved after BT/   Objective:    MEDICATIONS  (STANDING):  ALBUTerol/ipratropium for Nebulization 3 milliLiter(s) Nebulizer every 6 hours  bisacodyl 20 milliGRAM(s) Oral once  cholecalciferol 1000 Unit(s) Oral daily  docusate sodium 100 milliGRAM(s) Oral daily  folic acid 1 milliGRAM(s) Oral daily  guaiFENesin/dextromethorphan  Syrup 10 milliLiter(s) Oral three times a day  influenza   Vaccine 0.5 milliLiter(s) IntraMuscular once  loratadine 10 milliGRAM(s) Oral daily  magnesium citrate Oral Solution 1 Bottle Oral once  methadone    Tablet 55 milliGRAM(s) Oral daily  nicotine - 21 mG/24Hr(s) Patch 1 patch Transdermal daily  pantoprazole    Tablet 40 milliGRAM(s) Oral before breakfast  piperacillin/tazobactam IVPB. 3.375 Gram(s) IV Intermittent once  piperacillin/tazobactam IVPB.. 3.375 Gram(s) IV Intermittent every 8 hours  polyethylene glycol/electrolyte Solution. 4000 milliLiter(s) Oral once  senna 2 Tablet(s) Oral at bedtime    MEDICATIONS  (PRN):  acetaminophen   Tablet .. 650 milliGRAM(s) Oral every 6 hours PRN Temp greater or equal to 38C (100.4F), Mild Pain (1 - 3)  ALBUTerol/ipratropium for Nebulization 3 milliLiter(s) Nebulizer every 8 hours PRN Bronchospasm  aluminum hydroxide/magnesium hydroxide/simethicone Suspension 30 milliLiter(s) Oral every 6 hours PRN Dyspepsia  polyethylene glycol 3350 17 Gram(s) Oral daily PRN Constipation  traMADol 50 milliGRAM(s) Oral every 8 hours PRN Severe Pain (7 - 10)              Vital Signs Last 24 Hrs  T(C): 36.9 (17 Sep 2019 09:14), Max: 37.3 (16 Sep 2019 16:40)  T(F): 98.4 (17 Sep 2019 09:14), Max: 99.1 (16 Sep 2019 16:40)  HR: 84 (17 Sep 2019 09:46) (70 - 94)  BP: 109/54 (17 Sep 2019 09:14) (85/43 - 109/54)  BP(mean): --  RR: 17 (17 Sep 2019 09:14) (16 - 18)  SpO2: 92% (17 Sep 2019 09:46) (92% - 100%)      General:   NAD.  HEENT:    Normal appearance of conjunctiva, ears, nose, lips, oropharynx, and oral mucosa, anicteric.  Neck:  No masses, no asymmetry.  Lymph Nodes:  No lymphadenopathy.   Cardiovascular:  RRR normal S1/S2, no murmur.  Respiratory:  CTA B/L, normal respiratory effort.   Abdominal:   soft, no masses or tenderness, normoactive BS, NT/ND, no HSM.  Extremities:   No clubbing or cyanosis, normal capillary refill, no edema.   Skin:   No rash, jaundice, lesions, eczema.         LABS:                        7.5    7.13  )-----------( 342      ( 17 Sep 2019 10:15 )             23.6     09-17    139  |  105  |  7   ----------------------------<  107<H>  3.8   |  30  |  0.66    Ca    8.4      17 Sep 2019 10:15    TPro  6.4  /  Alb  1.4<L>  /  TBili  0.4  /  DBili  x   /  AST  13  /  ALT  16  /  AlkPhos  121<H>  09-17    PT/INR - ( 16 Sep 2019 09:54 )   PT: 16.7 sec;   INR: 1.49 ratio         PTT - ( 16 Sep 2019 09:54 )  PTT:55.4 sec      RADIOLOGY & ADDITIONAL TESTS:

## 2019-09-20 NOTE — PROGRESS NOTE ADULT - SUBJECTIVE AND OBJECTIVE BOX
Time of Visit:  Patient seen and examined. c/c of pleuritic chest pain    MEDICATIONS  (STANDING):  ALBUTerol/ipratropium for Nebulization 3 milliLiter(s) Nebulizer every 6 hours  cholecalciferol 1000 Unit(s) Oral daily  docusate sodium 100 milliGRAM(s) Oral daily  folic acid 1 milliGRAM(s) Oral daily  influenza   Vaccine 0.5 milliLiter(s) IntraMuscular once  loratadine 10 milliGRAM(s) Oral daily  methadone    Tablet 55 milliGRAM(s) Oral daily  nicotine - 21 mG/24Hr(s) Patch 1 patch Transdermal daily  pantoprazole    Tablet 40 milliGRAM(s) Oral before breakfast  senna 2 Tablet(s) Oral at bedtime      MEDICATIONS  (PRN):  acetaminophen   Tablet .. 650 milliGRAM(s) Oral every 6 hours PRN Temp greater or equal to 38C (100.4F), Mild Pain (1 - 3)  ALBUTerol/ipratropium for Nebulization 3 milliLiter(s) Nebulizer every 8 hours PRN Bronchospasm  aluminum hydroxide/magnesium hydroxide/simethicone Suspension 30 milliLiter(s) Oral every 6 hours PRN Dyspepsia  guaiFENesin/dextromethorphan  Syrup 10 milliLiter(s) Oral every 4 hours PRN Cough  polyethylene glycol 3350 17 Gram(s) Oral daily PRN Constipation  traMADol 50 milliGRAM(s) Oral every 8 hours PRN Severe Pain (7 - 10)       Medications up to date at time of exam.      PHYSICAL EXAMINATION:  Patient has no new complaints.  GENERAL: The patient is a well-developed, well-nourished, in no apparent distress.     Vital Signs Last 24 Hrs  T(C): 37.2 (20 Sep 2019 07:30), Max: 37.7 (19 Sep 2019 16:02)  T(F): 99 (20 Sep 2019 07:30), Max: 99.9 (19 Sep 2019 16:02)  HR: 93 (20 Sep 2019 07:30) (74 - 97)  BP: 111/59 (20 Sep 2019 07:30) (95/45 - 111/59)  BP(mean): --  RR: 17 (20 Sep 2019 07:30) (16 - 18)  SpO2: 96% (20 Sep 2019 07:30) (95% - 100%)   (if applicable)    Chest Tube (if applicable)    HEENT: Head is normocephalic and atraumatic. Extraocular muscles are intact. Mucous membranes are moist.     NECK: Supple, no palpable adenopathy.    LUNGS: Clear to auscultation, no wheezing, rales, or rhonchi.    HEART: Regular rate and rhythm without murmur.    ABDOMEN: Soft, nontender, and nondistended.  No hepatosplenomegaly is noted.    : No painful voiding, no pelvic pain    EXTREMITIES: Without any cyanosis, clubbing, rash, lesions or edema.    NEUROLOGIC: Awake, alert, oriented, grossly intact    SKIN: Warm, dry, good turgor.      LABS:                        9.3    7.71  )-----------( 340      ( 20 Sep 2019 07:03 )             29.0     09-20    136  |  102  |  5<L>  ----------------------------<  73  3.8   |  28  |  0.65    Ca    8.8      20 Sep 2019 07:03  Phos  3.9     09-20  Mg     1.9     09-20                Cytology:Cytopathology - Non Gyn Report (09.13.19 @ 15:45)    Cytopathology - Non Gyn Report:   ACCESSION No:  [id number]    NOLVIA SANTANA                      1        Cytopathology Report            Specimen(s) Submitted  LUNG, BRONCHOALVEOLAR LAVAGE, RIGHT      Clinical History  Lung mass.      Gross Description  Received: 30  ml of cloudy fluid in CytoLyt  Prepared: 1 ThinPrep slide, 1 cell block , 1 smear      Final Diagnosis  LUNG, BRONCHOALVEOLAR LAVAGE, RIGHT  NEGATIVE FOR MALIGNANT CELLS.    Cytology smears and cell block are composed of alveolar  macrophages and reactive respiratory epithelial cells in a  background of marked acute inflammation.    Screened by: Dhruv ADAN(ASCP)  Verified by: Leda Calzada MD  (Electronic Signature)  Reported on: 09/19/19 12:05 EDT, 6 [facility], [place]  [address]  Cytology technical processing performed at [address] [place] [id number]  _________________________________________________________________              MICROBIOLOGY: (if applicable)    RADIOLOGY & ADDITIONAL STUDIES:  EKG:   CXR:  ECHO:    IMPRESSION: 54y Female PAST MEDICAL & SURGICAL HISTORY:  Asthma  No significant past surgical history   p/w           Impression; 53 Y/O Female presented with SOB, Cough, Chest Pain. CT chest show RML bronchus obstruction with mediastinal adenopathy may be due to pan vs malignancy. Repeated CT chest shows no change in lung consolidatio vs mass. +klebsiella pneum bacteremia 9/3 repeat BC 9/5 neg. HIV negative. Pat last day for antibx 9/15. IR refused bx . S/P bronch 9/13.. no bx done and EBUS was not attempted due to coagulopathy.   Bronch culture neg.  CYtology of BAL neg for malignancy ,  S/P endoscopy 9/17    Suggestion;  -continue antibx for another 48 hours if pat remain afebrile  -repeat CT chest none contrast 4 weeks as out patient   -continue combivent inhaler 2 puff q6h upon discharge  - continue nicotine patch  -advice to stop smoking   -out pat pul f/u Time of Visit:  Patient seen and examined. c/c of pleuritic chest pain    MEDICATIONS  (STANDING):  ALBUTerol/ipratropium for Nebulization 3 milliLiter(s) Nebulizer every 6 hours  cholecalciferol 1000 Unit(s) Oral daily  docusate sodium 100 milliGRAM(s) Oral daily  folic acid 1 milliGRAM(s) Oral daily  influenza   Vaccine 0.5 milliLiter(s) IntraMuscular once  loratadine 10 milliGRAM(s) Oral daily  methadone    Tablet 55 milliGRAM(s) Oral daily  nicotine - 21 mG/24Hr(s) Patch 1 patch Transdermal daily  pantoprazole    Tablet 40 milliGRAM(s) Oral before breakfast  senna 2 Tablet(s) Oral at bedtime      MEDICATIONS  (PRN):  acetaminophen   Tablet .. 650 milliGRAM(s) Oral every 6 hours PRN Temp greater or equal to 38C (100.4F), Mild Pain (1 - 3)  ALBUTerol/ipratropium for Nebulization 3 milliLiter(s) Nebulizer every 8 hours PRN Bronchospasm  aluminum hydroxide/magnesium hydroxide/simethicone Suspension 30 milliLiter(s) Oral every 6 hours PRN Dyspepsia  guaiFENesin/dextromethorphan  Syrup 10 milliLiter(s) Oral every 4 hours PRN Cough  polyethylene glycol 3350 17 Gram(s) Oral daily PRN Constipation  traMADol 50 milliGRAM(s) Oral every 8 hours PRN Severe Pain (7 - 10)       Medications up to date at time of exam.      PHYSICAL EXAMINATION:  Patient has no new complaints.  GENERAL: The patient is a well-developed, well-nourished, in no apparent distress.     Vital Signs Last 24 Hrs  T(C): 37.2 (20 Sep 2019 07:30), Max: 37.7 (19 Sep 2019 16:02)  T(F): 99 (20 Sep 2019 07:30), Max: 99.9 (19 Sep 2019 16:02)  HR: 93 (20 Sep 2019 07:30) (74 - 97)  BP: 111/59 (20 Sep 2019 07:30) (95/45 - 111/59)  BP(mean): --  RR: 17 (20 Sep 2019 07:30) (16 - 18)  SpO2: 96% (20 Sep 2019 07:30) (95% - 100%)   (if applicable)    Chest Tube (if applicable)    HEENT: Head is normocephalic and atraumatic. Extraocular muscles are intact. Mucous membranes are moist.     NECK: Supple, no palpable adenopathy.    LUNGS: Clear to auscultation, no wheezing, rales, or rhonchi.    HEART: Regular rate and rhythm without murmur.    ABDOMEN: Soft, nontender, and nondistended.  No hepatosplenomegaly is noted.    : No painful voiding, no pelvic pain    EXTREMITIES: Without any cyanosis, clubbing, rash, lesions or edema.    NEUROLOGIC: Awake, alert, oriented, grossly intact    SKIN: Warm, dry, good turgor.      LABS:                        9.3    7.71  )-----------( 340      ( 20 Sep 2019 07:03 )             29.0     09-20    136  |  102  |  5<L>  ----------------------------<  73  3.8   |  28  |  0.65    Ca    8.8      20 Sep 2019 07:03  Phos  3.9     09-20  Mg     1.9     09-20                Cytology:Cytopathology - Non Gyn Report (09.13.19 @ 15:45)    Cytopathology - Non Gyn Report:   ACCESSION No:  [id number]    NOLVIA SANTANA                      1        Cytopathology Report            Specimen(s) Submitted  LUNG, BRONCHOALVEOLAR LAVAGE, RIGHT      Clinical History  Lung mass.      Gross Description  Received: 30  ml of cloudy fluid in CytoLyt  Prepared: 1 ThinPrep slide, 1 cell block , 1 smear      Final Diagnosis  LUNG, BRONCHOALVEOLAR LAVAGE, RIGHT  NEGATIVE FOR MALIGNANT CELLS.    Cytology smears and cell block are composed of alveolar  macrophages and reactive respiratory epithelial cells in a  background of marked acute inflammation.    Screened by: Dhruv ADAN(ASCP)  Verified by: Leda Calzada MD  (Electronic Signature)  Reported on: 09/19/19 12:05 EDT, 6 [facility], [place]  [address]  Cytology technical processing performed at [address] [place] [id number]  _________________________________________________________________              MICROBIOLOGY: (if applicable)    RADIOLOGY & ADDITIONAL STUDIES:  EKG:   CXR:  ECHO:    IMPRESSION: 54y Female PAST MEDICAL & SURGICAL HISTORY:  Asthma  No significant past surgical history   p/w           Impression; 53 Y/O Female presented with SOB, Cough, Chest Pain. CT chest show RML bronchus obstruction with mediastinal adenopathy may be due to pan vs malignancy. Repeated CT chest shows no change in lung consolidatio vs mass. +klebsiella pneum bacteremia 9/3 repeat BC 9/5 neg. HIV negative. Pat last day for antibx 9/15. IR refused bx . S/P bronch 9/13.. no bx done and EBUS was not attempted due to coagulopathy.   Bronch culture neg.  CYtology of BAL neg for malignancy ,  S/P endoscopy 9/17    Suggestion;  -i discussed cytology with pat in details and future management of lung abnormailty.. she understool conversation and all her questions answered pertaining to her lung issues  -continue antibx for another 48 hours if pat remain afebrile  -repeat CT chest none contrast 4 weeks as out patient   -continue combivent inhaler 2 puff q6h upon discharge  - continue nicotine patch  -advice to stop smoking   -out pat pul f/u

## 2019-09-20 NOTE — PROGRESS NOTE ADULT - PROBLEM SELECTOR PLAN 6
-hx of heroin abuse    -currently going to methadone clinic 6 times a week - c/w  methadone 55mg po daily.

## 2019-09-20 NOTE — PROGRESS NOTE ADULT - PROBLEM SELECTOR PLAN 8
-Right side pain likely costo-chrondritis  due to pneumonia and lung mass  -cardiology w/u all negative ( EKG/ Echo / stress test and CE )   -cont pain regimen.

## 2019-09-20 NOTE — PROGRESS NOTE ADULT - SUBJECTIVE AND OBJECTIVE BOX
HPI :  55 yo female, homeless with PMH of Asthma, who  presented with SOB, cough and right sided chest pain. CXR reveals opacification/consolidation of the RLL . Admitted to medicine for pneumonia. Completed 14days of zosyn regimen. Still has cough, but no SOB or sputum reported. Afebrile.   CT chest showed right lung mass with concern for underlying neoplasm . s/p bronchoscopy on 9/13  unable to do biopsy due to prolonged PTT  H/H trended low, s/p EGD and colonoscopy on 9/18 that showed non bleeding ulcer, received 1 unit PRBC yesterday    OVERNIGHT EVENTS: no acute events overnight         REVIEW OF SYSTEMS:  CONSTITUTIONAL: No fever, chills  NECK: No pain or stiffness  RESPIRATORY: No cough, SOB  CARDIOVASCULAR: No chest pain, palpitations  GASTROINTESTINAL: No abdominal pain. No nausea, vomiting, or diarrhea  GENITOURINARY: No dysuria  NEUROLOGICAL: No HA  MUSCULOSKELETAL: No joint pain or swelling; No muscle, back, or extremity pain    T(C): 37.3 (09-20-19 @ 15:30), Max: 37.4 (09-19-19 @ 20:47)  HR: 95 (09-20-19 @ 15:30) (81 - 97)  BP: 92/45 (09-20-19 @ 15:30) (92/45 - 111/59)  RR: 18 (09-20-19 @ 15:30) (16 - 18)  SpO2: 97% (09-20-19 @ 15:30) (95% - 98%)  Wt(kg): --Vital Signs Last 24 Hrs  T(C): 37.3 (20 Sep 2019 15:30), Max: 37.4 (19 Sep 2019 20:47)  T(F): 99.1 (20 Sep 2019 15:30), Max: 99.3 (19 Sep 2019 20:47)  HR: 95 (20 Sep 2019 15:30) (81 - 97)  BP: 92/45 (20 Sep 2019 15:30) (92/45 - 111/59)  BP(mean): --  RR: 18 (20 Sep 2019 15:30) (16 - 18)  SpO2: 97% (20 Sep 2019 15:30) (95% - 98%)    MEDICATIONS  (STANDING):  ALBUTerol/ipratropium for Nebulization 3 milliLiter(s) Nebulizer every 6 hours  cholecalciferol 1000 Unit(s) Oral daily  docusate sodium 100 milliGRAM(s) Oral daily  folic acid 1 milliGRAM(s) Oral daily  influenza   Vaccine 0.5 milliLiter(s) IntraMuscular once  loratadine 10 milliGRAM(s) Oral daily  methadone    Tablet 55 milliGRAM(s) Oral daily  nicotine - 21 mG/24Hr(s) Patch 1 patch Transdermal daily  pantoprazole    Tablet 40 milliGRAM(s) Oral before breakfast  senna 2 Tablet(s) Oral at bedtime    MEDICATIONS  (PRN):  acetaminophen   Tablet .. 650 milliGRAM(s) Oral every 6 hours PRN Temp greater or equal to 38C (100.4F), Mild Pain (1 - 3)  ALBUTerol/ipratropium for Nebulization 3 milliLiter(s) Nebulizer every 8 hours PRN Bronchospasm  aluminum hydroxide/magnesium hydroxide/simethicone Suspension 30 milliLiter(s) Oral every 6 hours PRN Dyspepsia  guaiFENesin/dextromethorphan  Syrup 10 milliLiter(s) Oral every 4 hours PRN Cough  polyethylene glycol 3350 17 Gram(s) Oral daily PRN Constipation  traMADol 50 milliGRAM(s) Oral every 8 hours PRN Severe Pain (7 - 10)      PHYSICAL EXAM:  GENERAL: NAD  ENMT: Moist mucous membranes  NECK: Supple, No JVD  CHEST/LUNG: Clear to auscultation bilaterally; No rales, rhonchi, wheezing, or rubs  HEART: S1, S2, Regular rate and rhythm  ABDOMEN: Soft, Nontender, Nondistended; Bowel sounds present  NEURO: Alert & Oriented X3  EXTREMITIES: No LE edema, no calf tenderness    Consultant(s) Notes Reviewed:  [x ] YES  [ ] NO  Care Discussed with Consultants/Other Providers [ x] YES  [ ] NO    LABS:                        9.3    7.71  )-----------( 340      ( 20 Sep 2019 07:03 )             29.0     09-20    136  |  102  |  5<L>  ----------------------------<  73  3.8   |  28  |  0.65    Ca    8.8      20 Sep 2019 07:03  Phos  3.9     09-20  Mg     1.9     09-20          CAPILLARY BLOOD GLUCOSE    RADIOLOGY & ADDITIONAL TESTS:    < from: Xray Chest 1 View-PORTABLE IMMEDIATE (09.02.19 @ 09:32) >    EXAM:  XR CHEST PORTABLE IMMED 1V                            PROCEDURE DATE:  09/02/2019          INTERPRETATION:  CLINICAL INDICATION: Cough. Right-sided chest pain.    COMPARISON: There are no similar prior studies available for comparison.     FINDINGS:  Opacification/consolidation of the right lower lung field. No sizable   left pleural effusion.   There is no evidence of pneumothorax.  The heart size is normal.     IMPRESSION:  Opacification/consolidation of the right lower lung field. This most   likely represents pneumonia. A superimposed right pleural effusion cannot   be excluded.      < end of copied text >      Imaging Personally Reviewed:  [ ] YES  [ ] NO

## 2019-09-20 NOTE — PROGRESS NOTE ADULT - ASSESSMENT
54 year old female with persistent anemia.       Plan:  EGD and Colonoscopy unremarkable   Anemia likely from other etiology than GI tract. Pending biopsy results   Will continue to follow   Hematology consulted reviewed and appreciated.   Advanced care planning was discussed with patient and family.  Advanced care planning forms were reviewed and discussed.  Risks, benefits and alternatives of gastroenterologic procedures were discussed in detail and all questions were answered.

## 2019-09-20 NOTE — PROGRESS NOTE ADULT - PROBLEM SELECTOR PLAN 10
History of substance abuse, PTSD, Depression  Patient was seen by Psychiatry, there is no contraindication for discharge to an inpatient rehab.
History of substance abuse, PTSD, Depression  Patient was seen by Psychiatry, there is no contraindication for discharge to an inpatient rehab.
will be discharged back to home once medically cleared
-pt medically optimized for discharge,   -shelter application in progress  - following
History of substance abuse, PTSD, Depression  Patient was seen by Psychiatry, there is no contraindication for discharge to an inpatient rehab.
per FELIX pt will be dc/ed to inpatient substance abuse rehab once medically stable
per FELIX pt will be dc/ed to inpatient substance abuse rehab once medically stable
Discharge when cleared
History of substance abuse, PTSD, Depression  Patient was seen by Psychiatry today, there is no contraindication for discharge to an inpatient rehab

## 2019-09-20 NOTE — PROGRESS NOTE ADULT - PROBLEM SELECTOR PROBLEM 9
Prophylactic measure
Prophylactic measure
Need for prophylactic measure
Need for prophylactic measure
Prophylactic measure
Need for prophylactic measure
Prophylactic measure
Need for prophylactic measure
Prophylactic measure

## 2019-09-20 NOTE — PROGRESS NOTE ADULT - PROBLEM SELECTOR PLAN 2
-s/p bronchoscopy on 9.13.2019, no bx at that time  - CT chest without contrast 6-8 weeks and f/u with Dr. Guo as outpatient

## 2019-09-20 NOTE — CHART NOTE - NSCHARTNOTEFT_GEN_A_CORE
Assessment:   54yFemalePatient is a 54y old  Female who presents with a chief complaint of SOB, Right sided CP, cough since yesterday morning (20 Sep 2019 13:34)      Factors impacting intake: [ ] none [ ] nausea  [ ] vomiting [ ] diarrhea [ ] constipation  [ ]chewing problems [ ] swallowing issues  [x ] other: Patient reports not consuming meals but drinks 2 cans of ensure enlive per day. had some food preferences, will honor them.     Diet Presciption: Diet, Regular:   Supplement Feeding Modality:  Oral  Ensure Enlive Cans or Servings Per Day:  3       Frequency:  Daily (09-18-19 @ 10:19)    Intake: meeting needs with oral supplementation    Current Weight: none   % Weight Change    Pertinent Medications: MEDICATIONS  (STANDING):  ALBUTerol/ipratropium for Nebulization 3 milliLiter(s) Nebulizer every 6 hours  cholecalciferol 1000 Unit(s) Oral daily  docusate sodium 100 milliGRAM(s) Oral daily  folic acid 1 milliGRAM(s) Oral daily  influenza   Vaccine 0.5 milliLiter(s) IntraMuscular once  loratadine 10 milliGRAM(s) Oral daily  methadone    Tablet 55 milliGRAM(s) Oral daily  nicotine - 21 mG/24Hr(s) Patch 1 patch Transdermal daily  pantoprazole    Tablet 40 milliGRAM(s) Oral before breakfast  senna 2 Tablet(s) Oral at bedtime    MEDICATIONS  (PRN):  acetaminophen   Tablet .. 650 milliGRAM(s) Oral every 6 hours PRN Temp greater or equal to 38C (100.4F), Mild Pain (1 - 3)  ALBUTerol/ipratropium for Nebulization 3 milliLiter(s) Nebulizer every 8 hours PRN Bronchospasm  aluminum hydroxide/magnesium hydroxide/simethicone Suspension 30 milliLiter(s) Oral every 6 hours PRN Dyspepsia  guaiFENesin/dextromethorphan  Syrup 10 milliLiter(s) Oral every 4 hours PRN Cough  polyethylene glycol 3350 17 Gram(s) Oral daily PRN Constipation  traMADol 50 milliGRAM(s) Oral every 8 hours PRN Severe Pain (7 - 10)    Pertinent Labs: 09-20 Na136 mmol/L Glu 73 mg/dL K+ 3.8 mmol/L Cr  0.65 mg/dL BUN 5 mg/dL<L> 09-20 Phos 3.9 mg/dL 09-18 Alb 1.6 g/dL<L> 09-03 [id number] 4.8 % 09-03 Chol 118 mg/dL LDL 49 mg/dL HDL 16 mg/dL<L> Trig 263 mg/dL<H>     CAPILLARY BLOOD GLUCOSE        Skin: No pressure ulcers     Estimated Needs:   [x ] no change since previous assessment  [ ] recalculated:     Previous Nutrition Diagnosis:   [ ] Inadequate Energy Intake [ ]Inadequate Oral Intake [ ] Excessive Energy Intake   [ ] Underweight [ ] Increased Nutrient Needs [ ] Overweight/Obesity   [ ] Altered GI Function [ ] Unintended Weight Loss [ ] Food & Nutrition Related Knowledge Deficit [ x] Malnutrition , severe     Nutrition Diagnosis is [x ] ongoing  [ ] resolved [ ] not applicable     New Nutrition Diagnosis: [ ] not applicable       Interventions:   Recommend  [ ] Change Diet To:  [ ] Nutrition Supplement  [ ] Nutrition Support  [ x] Other: continue with Regular diet and ensure enlive tid     Monitoring and Evaluation:   [x ] PO intake [ x ] Tolerance to diet prescription [ x ] weights [ x ] labs[ x ] follow up per protocol  [ ] other:

## 2019-09-20 NOTE — PROGRESS NOTE ADULT - PROBLEM SELECTOR PROBLEM 8
Severe protein-calorie malnutrition
Severe protein-calorie malnutrition
Chest wall pain
Chest wall pain
Severe protein-calorie malnutrition
Chest wall pain
Prophylactic measure
Prophylactic measure
Severe protein-calorie malnutrition
Current smoker
Severe protein-calorie malnutrition

## 2019-09-21 LAB
ANION GAP SERPL CALC-SCNC: 6 MMOL/L — SIGNIFICANT CHANGE UP (ref 5–17)
BUN SERPL-MCNC: 5 MG/DL — LOW (ref 7–18)
CALCIUM SERPL-MCNC: 8.6 MG/DL — SIGNIFICANT CHANGE UP (ref 8.4–10.5)
CHLORIDE SERPL-SCNC: 101 MMOL/L — SIGNIFICANT CHANGE UP (ref 96–108)
CO2 SERPL-SCNC: 28 MMOL/L — SIGNIFICANT CHANGE UP (ref 22–31)
CREAT SERPL-MCNC: 0.59 MG/DL — SIGNIFICANT CHANGE UP (ref 0.5–1.3)
GLUCOSE SERPL-MCNC: 91 MG/DL — SIGNIFICANT CHANGE UP (ref 70–99)
HCT VFR BLD CALC: 26.1 % — LOW (ref 34.5–45)
HGB BLD-MCNC: 8.4 G/DL — LOW (ref 11.5–15.5)
MCHC RBC-ENTMCNC: 30.4 PG — SIGNIFICANT CHANGE UP (ref 27–34)
MCHC RBC-ENTMCNC: 32.2 GM/DL — SIGNIFICANT CHANGE UP (ref 32–36)
MCV RBC AUTO: 94.6 FL — SIGNIFICANT CHANGE UP (ref 80–100)
NRBC # BLD: 0 /100 WBCS — SIGNIFICANT CHANGE UP (ref 0–0)
PLATELET # BLD AUTO: 335 K/UL — SIGNIFICANT CHANGE UP (ref 150–400)
POTASSIUM SERPL-MCNC: 3.6 MMOL/L — SIGNIFICANT CHANGE UP (ref 3.5–5.3)
POTASSIUM SERPL-SCNC: 3.6 MMOL/L — SIGNIFICANT CHANGE UP (ref 3.5–5.3)
RBC # BLD: 2.76 M/UL — LOW (ref 3.8–5.2)
RBC # FLD: 14.7 % — HIGH (ref 10.3–14.5)
SODIUM SERPL-SCNC: 135 MMOL/L — SIGNIFICANT CHANGE UP (ref 135–145)
WBC # BLD: 7.66 K/UL — SIGNIFICANT CHANGE UP (ref 3.8–10.5)
WBC # FLD AUTO: 7.66 K/UL — SIGNIFICANT CHANGE UP (ref 3.8–10.5)

## 2019-09-21 RX ADMIN — POLYETHYLENE GLYCOL 3350 17 GRAM(S): 17 POWDER, FOR SOLUTION ORAL at 11:24

## 2019-09-21 RX ADMIN — POLYETHYLENE GLYCOL 3350 17 GRAM(S): 17 POWDER, FOR SOLUTION ORAL at 21:13

## 2019-09-21 RX ADMIN — Medication 100 MILLIGRAM(S): at 11:24

## 2019-09-21 RX ADMIN — Medication 10 MILLILITER(S): at 06:30

## 2019-09-21 RX ADMIN — Medication 3 MILLILITER(S): at 21:06

## 2019-09-21 RX ADMIN — Medication 650 MILLIGRAM(S): at 16:27

## 2019-09-21 RX ADMIN — Medication 10 MILLILITER(S): at 02:14

## 2019-09-21 RX ADMIN — Medication 1000 UNIT(S): at 11:25

## 2019-09-21 RX ADMIN — Medication 3 MILLILITER(S): at 14:16

## 2019-09-21 RX ADMIN — Medication 1 PATCH: at 07:31

## 2019-09-21 RX ADMIN — TRAMADOL HYDROCHLORIDE 50 MILLIGRAM(S): 50 TABLET ORAL at 21:13

## 2019-09-21 RX ADMIN — Medication 1 PATCH: at 11:23

## 2019-09-21 RX ADMIN — SENNA PLUS 2 TABLET(S): 8.6 TABLET ORAL at 21:13

## 2019-09-21 RX ADMIN — TRAMADOL HYDROCHLORIDE 50 MILLIGRAM(S): 50 TABLET ORAL at 12:13

## 2019-09-21 RX ADMIN — Medication 10 MILLILITER(S): at 22:17

## 2019-09-21 RX ADMIN — Medication 1 PATCH: at 19:30

## 2019-09-21 RX ADMIN — TRAMADOL HYDROCHLORIDE 50 MILLIGRAM(S): 50 TABLET ORAL at 04:17

## 2019-09-21 RX ADMIN — PANTOPRAZOLE SODIUM 40 MILLIGRAM(S): 20 TABLET, DELAYED RELEASE ORAL at 06:30

## 2019-09-21 RX ADMIN — Medication 1 PATCH: at 11:31

## 2019-09-21 RX ADMIN — Medication 3 MILLILITER(S): at 08:08

## 2019-09-21 RX ADMIN — TRAMADOL HYDROCHLORIDE 50 MILLIGRAM(S): 50 TABLET ORAL at 22:20

## 2019-09-21 RX ADMIN — Medication 650 MILLIGRAM(S): at 17:20

## 2019-09-21 RX ADMIN — LORATADINE 10 MILLIGRAM(S): 10 TABLET ORAL at 11:24

## 2019-09-21 RX ADMIN — Medication 1 MILLIGRAM(S): at 11:24

## 2019-09-21 RX ADMIN — TRAMADOL HYDROCHLORIDE 50 MILLIGRAM(S): 50 TABLET ORAL at 04:47

## 2019-09-21 RX ADMIN — TRAMADOL HYDROCHLORIDE 50 MILLIGRAM(S): 50 TABLET ORAL at 13:00

## 2019-09-21 RX ADMIN — METHADONE HYDROCHLORIDE 55 MILLIGRAM(S): 40 TABLET ORAL at 11:23

## 2019-09-21 NOTE — PROGRESS NOTE ADULT - SUBJECTIVE AND OBJECTIVE BOX
Patient is a 54y old  Female who presents with a chief complaint of SOB, Right sided CP, cough since yesterday morning (20 Sep 2019 17:46)    PATIENT IS SEEN AND EXAMINED IN MEDICAL FLOOR.    ALLERGIES:  No Known Allergies    VITALS:    Vital Signs Last 24 Hrs  T(C): 37.5 (21 Sep 2019 16:25), Max: 37.5 (21 Sep 2019 16:25)  T(F): 99.5 (21 Sep 2019 16:25), Max: 99.5 (21 Sep 2019 16:25)  HR: 75 (21 Sep 2019 16:25) (75 - 93)  BP: 113/66 (21 Sep 2019 16:25) (94/49 - 113/66)  BP(mean): --  RR: 18 (21 Sep 2019 16:25) (18 - 18)  SpO2: 97% (21 Sep 2019 16:25) (94% - 97%)    LABS:    CBC Full  -  ( 21 Sep 2019 07:31 )  WBC Count : 7.66 K/uL  RBC Count : 2.76 M/uL  Hemoglobin : 8.4 g/dL  Hematocrit : 26.1 %  Platelet Count - Automated : 335 K/uL  Mean Cell Volume : 94.6 fl  Mean Cell Hemoglobin : 30.4 pg  Mean Cell Hemoglobin Concentration : 32.2 gm/dL  Auto Neutrophil # : x  Auto Lymphocyte # : x  Auto Monocyte # : x  Auto Eosinophil # : x  Auto Basophil # : x  Auto Neutrophil % : x  Auto Lymphocyte % : x  Auto Monocyte % : x  Auto Eosinophil % : x  Auto Basophil % : x      09-21    135  |  101  |  5<L>  ----------------------------<  91  3.6   |  28  |  0.59    Ca    8.6      21 Sep 2019 07:31  Phos  3.9     09-20  Mg     1.9     09-20      Creatinine Trend: 0.59<--, 0.65<--, 0.61<--, 0.67<--, 0.66<--, 0.65<--  I&O's Summary      BAL  09-14 @ 00:30   No growth at 1 week.  --    Few polymorphonuclear leukocytes per low power field  Rare Squamous epithelial cells per low power field  No organisms seen per oil power field      .Blood  09-05 @ 11:46   No growth at 5 days.  --  --      .Urine  09-04 @ 01:22   No growth  --  --      .Blood  09-03 @ 01:11   Growth in aerobic and anaerobic bottles: Klebsiella pneumoniae  "Due to technical problems, Proteus sp. will Not be reported as part of  the BCID panel until further notice"  ***Blood Panel PCR results on this specimen are available  approximately 3 hours after the Gram stain result.***  Gram stain, PCR, and/or culture results may not always  correspond due to difference in methodologies.  ************************************************************  This PCR assay was performed using [employer].  The following targets are tested for: Enterococcus,  vancomycin resistant enterococci, Listeria monocytogenes,  coagulase negative staphylococci, S. aureus,  methicillin resistant S. aureus, Streptococcus agalactiae  (Group B), S. pneumoniae, S. pyogenes (Group A),  Acinetobacter baumannii, Enterobacter cloacae, E. coli,  Klebsiella oxytoca, K. pneumoniae, Proteus sp.,  Serratia marcescens, Haemophilus influenzae,  Neisseria meningitidis, Pseudomonas aeruginosa, Candida  albicans, C. glabrata, C krusei, C parapsilosis,  C. tropicalis and the KPC resistance gene.  --  Blood Culture PCR  Klebsiella pneumoniae          MEDICATIONS:    MEDICATIONS  (STANDING):  ALBUTerol/ipratropium for Nebulization 3 milliLiter(s) Nebulizer every 6 hours  cholecalciferol 1000 Unit(s) Oral daily  docusate sodium 100 milliGRAM(s) Oral daily  folic acid 1 milliGRAM(s) Oral daily  influenza   Vaccine 0.5 milliLiter(s) IntraMuscular once  loratadine 10 milliGRAM(s) Oral daily  methadone    Tablet 55 milliGRAM(s) Oral daily  nicotine - 21 mG/24Hr(s) Patch 1 patch Transdermal daily  pantoprazole    Tablet 40 milliGRAM(s) Oral before breakfast  senna 2 Tablet(s) Oral at bedtime      MEDICATIONS  (PRN):  acetaminophen   Tablet .. 650 milliGRAM(s) Oral every 6 hours PRN Temp greater or equal to 38C (100.4F), Mild Pain (1 - 3)  ALBUTerol/ipratropium for Nebulization 3 milliLiter(s) Nebulizer every 8 hours PRN Bronchospasm  aluminum hydroxide/magnesium hydroxide/simethicone Suspension 30 milliLiter(s) Oral every 6 hours PRN Dyspepsia  guaiFENesin/dextromethorphan  Syrup 10 milliLiter(s) Oral every 4 hours PRN Cough  polyethylene glycol 3350 17 Gram(s) Oral daily PRN Constipation  traMADol 50 milliGRAM(s) Oral every 8 hours PRN Severe Pain (7 - 10)      REVIEW OF SYSTEMS:                           ALL ROS DONE [ X   ]    CONSTITUTIONAL:  LETHARGIC [   ], FEVER [   ], UNRESPONSIVE [   ]  CVS:  CP  [   ], SOB, [   ], PALPITATIONS [   ], DIZZYNESS [   ]  RS: COUGH [   ], SPUTUM [   ]  GI: ABDOMINAL PAIN [   ], NAUSEA [   ], VOMITINGS [   ], DIARRHEA [   ], CONSTIPATION [   ]  :  DYSURIA [   ], NOCTURIA [   ], INCREASED FREQUENCY [   ], DRIBLING [   ],  SKELETAL: PAINFUL JOINTS [   ], SWOLLEN JOINTS [   ], NECK ACHE [   ], LOW BACK ACHE [   ],  SKIN : ULCERS [   ], RASH [   ], ITCHING [   ]  CNS: HEAD ACHE [   ], DOUBLE VISION [   ], BLURRED VISION [   ], AMS / CONFUSION [   ], SEIZURES [   ], WEAKNESS [   ],TINGLING / NUMBNESS [   ]    PHYSICAL EXAMINATION:  GENERAL APPEARANCE: NO DISTRESS  HEENT:  NO PALLOR, NO  JVD,  NO   NODES, NECK SUPPLE  CVS: S1 +, S2 +,   RS: AEEB,  OCCASIONAL  RALES +,   NO RONCHI  ABD: SOFT, NT, NO, BS +  EXT: NO PE  SKIN: WARM,   SKELETAL:  ROM ACCEPTABLE  CNS:  AAO X 3   , NO  DEFICITS    RADIOLOGY :  < from: CT Chest No Cont (09.02.19 @ 12:33) >  IMPRESSION: Soft tissue impaction of the right middle and lower lobe   bronchi. Extensive airspace consolidations ofthe right middle and lower   lobes. Overall findings may reflect severe multifocal pneumonia, however   close follow-up to in short resolution and exclude endobronchial mass.   Mild mediastinal adenopathy.    < end of copied text >      ASSESSMENT :     Lobar pneumonia  Asthma      PLAN:  HPI:  53 yo female with PMH of Asthma, presented with SOB, cough and right sided chest pain since yesterday morning. She started feeling short of breath, present at rest, not relieved with Albuterol inhaler, unable to ambulate due to SOB since yesterday. It was associated with wheeze and cough with whitish sputum. She also complains of subjective fever. Chest pain is present on right lower side, 7/10 in severity, on/off, non radiating, stabbing in nature, worse with sitting and moving and lying on that side, relieved with Motrin.     Off note, she quit Heroin abuse on August 3rd and since then has been on Methadone 55mg OD. She goes to the Methadone clinic every day, 6 days a week for her Methadone dose. Clinic #: [phone]-Ext: [phone]. She has taken her dose for today. Primary team to call Clinic to confirm dose. Clinic was closed today. (02 Sep 2019 13:00)    - COAGULOPATHY DUE TO INFECTION AND NUTRITIONAL DEFICIENCY. S/P IV VIT K. ADDED VIT K PO  - S/P BRONCHOSCOPY , PROLONGED INR / PTT & BX COULD NOT BE DONE, HAD BAL. PATIENT IS EXPLAINED TO F/UP DR. DOZIER, PULMONARY CONSULTANT AS OUT PATIENT IN FEW WEEKS AFTER COMPLETING ANTIBIOTICS & RPT CHEST CT & LUNG BX IF NEEDED AS OUT PATIENT IN FEW WEEKS, TO EVALUATE FOR MALIGNANCY. AND ALSO TO F/UP DR. HERNANDEZ , ONCOLOGIST AS OUT PATIENT  -  DC PLAN / TRANSFER TO IN PATIENT REHAB  - MULTIFOCAL PNEUMONIA, SUSPECT ASPIRATION PNEUMONIA, BACTEREMIA  ON IV ZOSYN - TO COMPLETE TOTAL 2 WEEKS   - CHEST PAIN, PLEURITIC  ON TRAMADOL, TYLENOL, METHADONE  - PULMONARY F/UP IS IN PROGRESS. MAY NEED BRONCHOSCOPY  - SUBSTANCE ABUSE , SOCIAL ISSUES AT HOME, SW TO COORDINATE WITH PATIENT FOR OUT PATIENT REHAB AND PLACEMENT  - GI AND DVT PROPHYLAXIS  - DR. TOVAR

## 2019-09-22 RX ADMIN — SENNA PLUS 2 TABLET(S): 8.6 TABLET ORAL at 21:46

## 2019-09-22 RX ADMIN — TRAMADOL HYDROCHLORIDE 50 MILLIGRAM(S): 50 TABLET ORAL at 23:00

## 2019-09-22 RX ADMIN — TRAMADOL HYDROCHLORIDE 50 MILLIGRAM(S): 50 TABLET ORAL at 06:50

## 2019-09-22 RX ADMIN — PANTOPRAZOLE SODIUM 40 MILLIGRAM(S): 20 TABLET, DELAYED RELEASE ORAL at 06:02

## 2019-09-22 RX ADMIN — Medication 10 MILLILITER(S): at 21:46

## 2019-09-22 RX ADMIN — Medication 1 PATCH: at 08:09

## 2019-09-22 RX ADMIN — Medication 10 MILLILITER(S): at 06:02

## 2019-09-22 RX ADMIN — Medication 1000 UNIT(S): at 11:10

## 2019-09-22 RX ADMIN — Medication 1 MILLIGRAM(S): at 11:10

## 2019-09-22 RX ADMIN — TRAMADOL HYDROCHLORIDE 50 MILLIGRAM(S): 50 TABLET ORAL at 14:30

## 2019-09-22 RX ADMIN — TRAMADOL HYDROCHLORIDE 50 MILLIGRAM(S): 50 TABLET ORAL at 14:02

## 2019-09-22 RX ADMIN — Medication 1 PATCH: at 19:51

## 2019-09-22 RX ADMIN — Medication 10 MILLILITER(S): at 11:10

## 2019-09-22 RX ADMIN — Medication 1 PATCH: at 11:09

## 2019-09-22 RX ADMIN — POLYETHYLENE GLYCOL 3350 17 GRAM(S): 17 POWDER, FOR SOLUTION ORAL at 15:14

## 2019-09-22 RX ADMIN — LORATADINE 10 MILLIGRAM(S): 10 TABLET ORAL at 11:10

## 2019-09-22 RX ADMIN — TRAMADOL HYDROCHLORIDE 50 MILLIGRAM(S): 50 TABLET ORAL at 06:02

## 2019-09-22 RX ADMIN — Medication 10 MILLILITER(S): at 15:14

## 2019-09-22 RX ADMIN — Medication 100 MILLIGRAM(S): at 11:10

## 2019-09-22 RX ADMIN — METHADONE HYDROCHLORIDE 55 MILLIGRAM(S): 40 TABLET ORAL at 11:11

## 2019-09-22 RX ADMIN — Medication 3 MILLILITER(S): at 14:31

## 2019-09-22 RX ADMIN — Medication 3 MILLILITER(S): at 20:20

## 2019-09-22 RX ADMIN — TRAMADOL HYDROCHLORIDE 50 MILLIGRAM(S): 50 TABLET ORAL at 22:32

## 2019-09-22 RX ADMIN — Medication 1 PATCH: at 11:11

## 2019-09-22 NOTE — PROGRESS NOTE ADULT - SUBJECTIVE AND OBJECTIVE BOX
Patient is a 54y old  Female who presents with a chief complaint of SOB, Right sided CP, cough since yesterday morning (20 Sep 2019 17:46)    PATIENT IS SEEN AND EXAMINED IN MEDICAL FLOOR.    ALLERGIES:  No Known Allergies    MEDICATIONS  (STANDING):  ALBUTerol/ipratropium for Nebulization 3 milliLiter(s) Nebulizer every 6 hours  cholecalciferol 1000 Unit(s) Oral daily  docusate sodium 100 milliGRAM(s) Oral daily  folic acid 1 milliGRAM(s) Oral daily  influenza   Vaccine 0.5 milliLiter(s) IntraMuscular once  loratadine 10 milliGRAM(s) Oral daily  methadone    Tablet 55 milliGRAM(s) Oral daily  nicotine - 21 mG/24Hr(s) Patch 1 patch Transdermal daily  pantoprazole    Tablet 40 milliGRAM(s) Oral before breakfast  senna 2 Tablet(s) Oral at bedtime    MEDICATIONS  (PRN):  acetaminophen   Tablet .. 650 milliGRAM(s) Oral every 6 hours PRN Temp greater or equal to 38C (100.4F), Mild Pain (1 - 3)  ALBUTerol/ipratropium for Nebulization 3 milliLiter(s) Nebulizer every 8 hours PRN Bronchospasm  aluminum hydroxide/magnesium hydroxide/simethicone Suspension 30 milliLiter(s) Oral every 6 hours PRN Dyspepsia  guaiFENesin/dextromethorphan  Syrup 10 milliLiter(s) Oral every 4 hours PRN Cough  polyethylene glycol 3350 17 Gram(s) Oral daily PRN Constipation  traMADol 50 milliGRAM(s) Oral every 8 hours PRN Severe Pain (7 - 10)    Vital Signs Last 24 Hrs  T(C): 36.6 (22 Sep 2019 15:29), Max: 37.5 (22 Sep 2019 05:34)  T(F): 97.9 (22 Sep 2019 15:29), Max: 99.5 (22 Sep 2019 05:34)  HR: 87 (22 Sep 2019 15:29) (75 - 87)  BP: 101/55 (22 Sep 2019 15:29) (101/55 - 112/60)  BP(mean): --  RR: 16 (22 Sep 2019 15:29) (15 - 18)  SpO2: 95% (22 Sep 2019 15:29) (92% - 99%)    LABS:  09-21    135  |  101  |  5<L>  ----------------------------<  91  3.6   |  28  |  0.59    Ca    8.6      21 Sep 2019 07:31      Creatinine Trend: 0.59 <--, 0.65 <--, 0.61 <--, 0.67 <--, 0.66 <--, 0.65 <--                        8.4    7.66  )-----------( 335      ( 21 Sep 2019 07:31 )             26.1     Urine Studies:                  REVIEW OF SYSTEMS:                           ALL ROS DONE [ X   ]    CONSTITUTIONAL:  LETHARGIC [   ], FEVER [   ], UNRESPONSIVE [   ]  CVS:  CP  [   ], SOB, [   ], PALPITATIONS [   ], DIZZYNESS [   ]  RS: COUGH [   ], SPUTUM [   ]  GI: ABDOMINAL PAIN [   ], NAUSEA [   ], VOMITINGS [   ], DIARRHEA [   ], CONSTIPATION [   ]  :  DYSURIA [   ], NOCTURIA [   ], INCREASED FREQUENCY [   ], DRIBLING [   ],  SKELETAL: PAINFUL JOINTS [   ], SWOLLEN JOINTS [   ], NECK ACHE [   ], LOW BACK ACHE [   ],  SKIN : ULCERS [   ], RASH [   ], ITCHING [   ]  CNS: HEAD ACHE [   ], DOUBLE VISION [   ], BLURRED VISION [   ], AMS / CONFUSION [   ], SEIZURES [   ], WEAKNESS [   ],TINGLING / NUMBNESS [   ]    PHYSICAL EXAMINATION:  GENERAL APPEARANCE: NO DISTRESS  HEENT:  NO PALLOR, NO  JVD,  NO   NODES, NECK SUPPLE  CVS: S1 +, S2 +,   RS: AEEB,  OCCASIONAL  RALES +,   NO RONCHI  ABD: SOFT, NT, NO, BS +  EXT: NO PE  SKIN: WARM,   SKELETAL:  ROM ACCEPTABLE  CNS:  AAO X 3   , NO  DEFICITS    RADIOLOGY :  < from: CT Chest No Cont (09.02.19 @ 12:33) >  IMPRESSION: Soft tissue impaction of the right middle and lower lobe   bronchi. Extensive airspace consolidations ofthe right middle and lower   lobes. Overall findings may reflect severe multifocal pneumonia, however   close follow-up to in short resolution and exclude endobronchial mass.   Mild mediastinal adenopathy.    < end of copied text >      ASSESSMENT :     Lobar pneumonia  Asthma      PLAN:  HPI:  53 yo female with PMH of Asthma, presented with SOB, cough and right sided chest pain since yesterday morning. She started feeling short of breath, present at rest, not relieved with Albuterol inhaler, unable to ambulate due to SOB since yesterday. It was associated with wheeze and cough with whitish sputum. She also complains of subjective fever. Chest pain is present on right lower side, 7/10 in severity, on/off, non radiating, stabbing in nature, worse with sitting and moving and lying on that side, relieved with Motrin.     Off note, she quit Heroin abuse on August 3rd and since then has been on Methadone 55mg OD. She goes to the Methadone clinic every day, 6 days a week for her Methadone dose. Clinic #: [phone]-Ext: [phone]. She has taken her dose for today. Primary team to call Clinic to confirm dose. Clinic was closed today. (02 Sep 2019 13:00)    - COAGULOPATHY DUE TO INFECTION AND NUTRITIONAL DEFICIENCY. S/P IV VIT K. ADDED VIT K PO  - S/P BRONCHOSCOPY , PROLONGED INR / PTT & BX COULD NOT BE DONE, HAD BAL. PATIENT IS EXPLAINED TO F/UP DR. DOZIER, PULMONARY CONSULTANT AS OUT PATIENT IN FEW WEEKS AFTER COMPLETING ANTIBIOTICS & RPT CHEST CT & LUNG BX IF NEEDED AS OUT PATIENT IN FEW WEEKS, TO EVALUATE FOR MALIGNANCY. AND ALSO TO F/UP DR. HERNANDEZ , ONCOLOGIST AS OUT PATIENT  -  DC PLAN / TRANSFER TO IN PATIENT REHAB  - MULTIFOCAL PNEUMONIA, SUSPECT ASPIRATION PNEUMONIA, BACTEREMIA  ON IV ZOSYN - TO COMPLETE TOTAL 2 WEEKS   - CHEST PAIN, PLEURITIC  ON TRAMADOL, TYLENOL, METHADONE  - PULMONARY F/UP IS IN PROGRESS. MAY NEED BRONCHOSCOPY  - SUBSTANCE ABUSE , SOCIAL ISSUES AT HOME, SW TO COORDINATE WITH PATIENT FOR OUT PATIENT REHAB AND PLACEMENT  - GI AND DVT PROPHYLAXIS  - DR. TOVAR

## 2019-09-23 DIAGNOSIS — D68.9 COAGULATION DEFECT, UNSPECIFIED: ICD-10-CM

## 2019-09-23 DIAGNOSIS — K70.9 ALCOHOLIC LIVER DISEASE, UNSPECIFIED: ICD-10-CM

## 2019-09-23 DIAGNOSIS — R80.3 BENCE JONES PROTEINURIA: ICD-10-CM

## 2019-09-23 LAB
ANION GAP SERPL CALC-SCNC: 5 MMOL/L — SIGNIFICANT CHANGE UP (ref 5–17)
BUN SERPL-MCNC: 7 MG/DL — SIGNIFICANT CHANGE UP (ref 7–18)
CALCIUM SERPL-MCNC: 8.8 MG/DL — SIGNIFICANT CHANGE UP (ref 8.4–10.5)
CHLORIDE SERPL-SCNC: 100 MMOL/L — SIGNIFICANT CHANGE UP (ref 96–108)
CO2 SERPL-SCNC: 30 MMOL/L — SIGNIFICANT CHANGE UP (ref 22–31)
CREAT SERPL-MCNC: 0.62 MG/DL — SIGNIFICANT CHANGE UP (ref 0.5–1.3)
GLUCOSE SERPL-MCNC: 72 MG/DL — SIGNIFICANT CHANGE UP (ref 70–99)
HCT VFR BLD CALC: 27.7 % — LOW (ref 34.5–45)
HGB BLD-MCNC: 8.8 G/DL — LOW (ref 11.5–15.5)
M PROTEIN 24H UR ELPH-MRATE: PRESENT
MCHC RBC-ENTMCNC: 30.1 PG — SIGNIFICANT CHANGE UP (ref 27–34)
MCHC RBC-ENTMCNC: 31.8 GM/DL — LOW (ref 32–36)
MCV RBC AUTO: 94.9 FL — SIGNIFICANT CHANGE UP (ref 80–100)
NRBC # BLD: 0 /100 WBCS — SIGNIFICANT CHANGE UP (ref 0–0)
PLATELET # BLD AUTO: 394 K/UL — SIGNIFICANT CHANGE UP (ref 150–400)
POTASSIUM SERPL-MCNC: 3.6 MMOL/L — SIGNIFICANT CHANGE UP (ref 3.5–5.3)
POTASSIUM SERPL-SCNC: 3.6 MMOL/L — SIGNIFICANT CHANGE UP (ref 3.5–5.3)
RBC # BLD: 2.92 M/UL — LOW (ref 3.8–5.2)
RBC # FLD: 14.3 % — SIGNIFICANT CHANGE UP (ref 10.3–14.5)
SODIUM SERPL-SCNC: 135 MMOL/L — SIGNIFICANT CHANGE UP (ref 135–145)
WBC # BLD: 7.58 K/UL — SIGNIFICANT CHANGE UP (ref 3.8–10.5)
WBC # FLD AUTO: 7.58 K/UL — SIGNIFICANT CHANGE UP (ref 3.8–10.5)

## 2019-09-23 PROCEDURE — 99232 SBSQ HOSP IP/OBS MODERATE 35: CPT

## 2019-09-23 RX ORDER — METHADONE HYDROCHLORIDE 40 MG/1
55 TABLET ORAL DAILY
Refills: 0 | Status: DISCONTINUED | OUTPATIENT
Start: 2019-09-23 | End: 2019-09-24

## 2019-09-23 RX ORDER — PHYTONADIONE (VIT K1) 5 MG
5 TABLET ORAL DAILY
Refills: 0 | Status: DISCONTINUED | OUTPATIENT
Start: 2019-09-23 | End: 2019-09-24

## 2019-09-23 RX ADMIN — Medication 1 MILLIGRAM(S): at 11:17

## 2019-09-23 RX ADMIN — Medication 10 MILLILITER(S): at 05:39

## 2019-09-23 RX ADMIN — SENNA PLUS 2 TABLET(S): 8.6 TABLET ORAL at 21:34

## 2019-09-23 RX ADMIN — Medication 1 PATCH: at 11:17

## 2019-09-23 RX ADMIN — METHADONE HYDROCHLORIDE 55 MILLIGRAM(S): 40 TABLET ORAL at 11:16

## 2019-09-23 RX ADMIN — Medication 10 MILLILITER(S): at 11:18

## 2019-09-23 RX ADMIN — Medication 1 PATCH: at 07:39

## 2019-09-23 RX ADMIN — Medication 1 PATCH: at 11:41

## 2019-09-23 RX ADMIN — TRAMADOL HYDROCHLORIDE 50 MILLIGRAM(S): 50 TABLET ORAL at 07:07

## 2019-09-23 RX ADMIN — POLYETHYLENE GLYCOL 3350 17 GRAM(S): 17 POWDER, FOR SOLUTION ORAL at 11:18

## 2019-09-23 RX ADMIN — Medication 100 MILLIGRAM(S): at 11:17

## 2019-09-23 RX ADMIN — TRAMADOL HYDROCHLORIDE 50 MILLIGRAM(S): 50 TABLET ORAL at 18:18

## 2019-09-23 RX ADMIN — Medication 10 MILLILITER(S): at 21:34

## 2019-09-23 RX ADMIN — Medication 650 MILLIGRAM(S): at 06:10

## 2019-09-23 RX ADMIN — TRAMADOL HYDROCHLORIDE 50 MILLIGRAM(S): 50 TABLET ORAL at 18:55

## 2019-09-23 RX ADMIN — PANTOPRAZOLE SODIUM 40 MILLIGRAM(S): 20 TABLET, DELAYED RELEASE ORAL at 05:39

## 2019-09-23 RX ADMIN — Medication 650 MILLIGRAM(S): at 05:40

## 2019-09-23 RX ADMIN — Medication 1 PATCH: at 19:28

## 2019-09-23 RX ADMIN — TRAMADOL HYDROCHLORIDE 50 MILLIGRAM(S): 50 TABLET ORAL at 06:46

## 2019-09-23 RX ADMIN — Medication 1000 UNIT(S): at 11:17

## 2019-09-23 RX ADMIN — Medication 10 MILLILITER(S): at 18:18

## 2019-09-23 RX ADMIN — LORATADINE 10 MILLIGRAM(S): 10 TABLET ORAL at 11:17

## 2019-09-23 NOTE — PROGRESS NOTE ADULT - PROBLEM SELECTOR PROBLEM 3
Chest wall pain
Bence Poe protein
Chest wall pain
Pneumonia of right lower lobe due to infectious organism
Pneumonia of right lower lobe due to infectious organism
Chest wall pain
Pneumonia of right lower lobe due to infectious organism
Chest wall pain
Chest wall pain
Coagulopathy
Chest wall pain
Lung mass

## 2019-09-23 NOTE — PROGRESS NOTE ADULT - ASSESSMENT
53 yo female, homeless with PMH of Asthma, who  presented with SOB, cough and right sided chest pain. CXR reveals opacification/consolidation of the RLL . Admitted to medicine for pneumonia. Completed 14 days of zosyn. CT chest showed right lung mass with concern for underlying neoplasm. S/p bronchoscopy on 9/13  unable to do biopsy due to prolonged PTT. H/H trended down s/p EGD and colonoscopy on 9/18 that showed non bleeding ulcer, received 1 unit PRBC.

## 2019-09-23 NOTE — PROGRESS NOTE ADULT - ASSESSMENT
54 year old female with persistent anemia.       Plan:  EGD and Colonoscopy unremarkable   Anemia likely from other etiology than GI tract.   Will continue to follow   Hematology consulted reviewed and appreciated.   Advanced care planning was discussed with patient and family.  Advanced care planning forms were reviewed and discussed.  Risks, benefits and alternatives of gastroenterologic procedures were discussed in detail and all questions were answered.

## 2019-09-23 NOTE — PROGRESS NOTE ADULT - NSHPATTENDINGPLANDISCUSS_GEN_ALL_CORE
D/W TEAM AND STAFF
D/W STAFF
D/W STAFF AND PATIENT
D/W PATIENT AND STAFF
D/W PATIENT AND STAFF
D/W STAFF
D/W PATIENT AND STAFF
D/W STAFF
D/W STAFF AND PATIENT

## 2019-09-23 NOTE — PROGRESS NOTE ADULT - PROBLEM SELECTOR PLAN 1
S/p bronchoscopy on 9/13, unable to do biopsy due to coagulopathy   Repeat chest CT in 4 weeks as outpatient   Pt to follow up with Dr. Guo to schedule biopsy if warranted

## 2019-09-23 NOTE — PROGRESS NOTE ADULT - PROBLEM SELECTOR PLAN 3
ЕКАТЕРИНА is neg  kidney function normal, no proteinuria  will check free light chain in serum and urine  check beta2 microglobulin

## 2019-09-23 NOTE — PROGRESS NOTE ADULT - PROBLEM SELECTOR PLAN 7
Plan for discharge to shelter, needs approval   Social work following    Plan to make heme/onc and pulmonary appointments for pt before discharge

## 2019-09-23 NOTE — PROGRESS NOTE ADULT - SUBJECTIVE AND OBJECTIVE BOX
NP Note discussed with  Primary Attending    53 yo female, homeless with PMH of Asthma, who  presented with SOB, cough and right sided chest pain. CXR reveals opacification/consolidation of the RLL . Admitted to medicine for pneumonia. Completed 14 days of zosyn. CT chest showed right lung mass with concern for underlying neoplasm. S/p bronchoscopy on 9/13  unable to do biopsy due to prolonged PTT. H/H trended down s/p EGD and colonoscopy on 9/18 that showed non bleeding ulcer, received 1 unit PRBC.     INTERVAL HPI/OVERNIGHT EVENTS: no new complaints    MEDICATIONS  (STANDING):  ALBUTerol/ipratropium for Nebulization 3 milliLiter(s) Nebulizer every 6 hours  cholecalciferol 1000 Unit(s) Oral daily  docusate sodium 100 milliGRAM(s) Oral daily  folic acid 1 milliGRAM(s) Oral daily  influenza   Vaccine 0.5 milliLiter(s) IntraMuscular once  loratadine 10 milliGRAM(s) Oral daily  nicotine - 21 mG/24Hr(s) Patch 1 patch Transdermal daily  pantoprazole    Tablet 40 milliGRAM(s) Oral before breakfast  phytonadione   Solution 5 milliGRAM(s) Oral daily  senna 2 Tablet(s) Oral at bedtime    MEDICATIONS  (PRN):  acetaminophen   Tablet .. 650 milliGRAM(s) Oral every 6 hours PRN Temp greater or equal to 38C (100.4F), Mild Pain (1 - 3)  ALBUTerol/ipratropium for Nebulization 3 milliLiter(s) Nebulizer every 8 hours PRN Bronchospasm  aluminum hydroxide/magnesium hydroxide/simethicone Suspension 30 milliLiter(s) Oral every 6 hours PRN Dyspepsia  guaiFENesin/dextromethorphan  Syrup 10 milliLiter(s) Oral every 4 hours PRN Cough  polyethylene glycol 3350 17 Gram(s) Oral daily PRN Constipation  traMADol 50 milliGRAM(s) Oral every 8 hours PRN Severe Pain (7 - 10)      __________________________________________________  REVIEW OF SYSTEMS:    CONSTITUTIONAL: No fever,   EYES: no acute visual disturbances  NECK: No pain or stiffness  RESPIRATORY: No cough; No shortness of breath  CARDIOVASCULAR: No chest pain, no palpitations  GASTROINTESTINAL: No pain. No nausea or vomiting; No diarrhea   NEUROLOGICAL: No headache or numbness, no tremors  MUSCULOSKELETAL: No joint pain, no muscle pain  GENITOURINARY: no dysuria, no frequency, no hesitancy  PSYCHIATRY: no depression , no anxiety  ALL OTHER  ROS negative        Vital Signs Last 24 Hrs  T(C): 36.9 (23 Sep 2019 15:47), Max: 37.1 (23 Sep 2019 00:16)  T(F): 98.5 (23 Sep 2019 15:47), Max: 98.8 (23 Sep 2019 00:16)  HR: 93 (23 Sep 2019 15:47) (79 - 93)  BP: 94/50 (23 Sep 2019 15:47) (91/50 - 100/52)  BP(mean): --  RR: 18 (23 Sep 2019 15:47) (15 - 18)  SpO2: 96% (23 Sep 2019 15:47) (94% - 96%)    ________________________________________________  PHYSICAL EXAM:  GENERAL: NAD  HEENT: Normocephalic;  conjunctivae and sclerae clear; moist mucous membranes;   NECK : supple  CHEST/LUNG: Clear to auscultation bilaterally with good air entry   HEART: S1 S2  regular; no murmurs, gallops or rubs  ABDOMEN: Soft, Nontender, Nondistended; Bowel sounds present  EXTREMITIES: no cyanosis; no edema; no calf tenderness  SKIN: warm and dry; no rash  NERVOUS SYSTEM:  Awake and alert; Oriented  to place, person and time ; no new deficits  _________________________________________________  LABS:                        8.8    7.58  )-----------( 394      ( 23 Sep 2019 07:51 )             27.7     09-23    135  |  100  |  7   ----------------------------<  72  3.6   |  30  |  0.62    Ca    8.8      23 Sep 2019 07:51          CAPILLARY BLOOD GLUCOSE            RADIOLOGY & ADDITIONAL TESTS:    Imaging  Reviewed:  YES/NO    Consultant(s) Notes Reviewed:   YES/ No      Plan of care was discussed with patient and /or primary care giver; all questions and concerns were addressed

## 2019-09-23 NOTE — PROGRESS NOTE ADULT - PROBLEM SELECTOR PROBLEM 6
Current smoker
Severe protein-calorie malnutrition
Chest wall pain
Heroin abuse
Current smoker
Methadone maintenance therapy patient
Methadone maintenance therapy patient
Current smoker
Methadone maintenance therapy patient
Prophylactic measure
R/O Smoker
R/O Smoker
Severe protein-calorie malnutrition
Current smoker

## 2019-09-23 NOTE — PROGRESS NOTE ADULT - PROBLEM SELECTOR PROBLEM 7
Heroin abuse
Discharge planning issues
Methadone maintenance therapy patient
Prophylactic measure
Heroin abuse
Severe protein-calorie malnutrition
Severe protein-calorie malnutrition
Heroin abuse
Severe protein-calorie malnutrition
Heroin abuse
Heroin abuse

## 2019-09-23 NOTE — PROGRESS NOTE ADULT - SUBJECTIVE AND OBJECTIVE BOX
feel fine    a little sob after bathroom  no fever or chill' a liitle pain at right lower chest wall      MEDICATIONS  (STANDING):  ALBUTerol/ipratropium for Nebulization 3 milliLiter(s) Nebulizer every 6 hours  cholecalciferol 1000 Unit(s) Oral daily  docusate sodium 100 milliGRAM(s) Oral daily  folic acid 1 milliGRAM(s) Oral daily  influenza   Vaccine 0.5 milliLiter(s) IntraMuscular once  loratadine 10 milliGRAM(s) Oral daily  methadone    Tablet 55 milliGRAM(s) Oral daily  nicotine - 21 mG/24Hr(s) Patch 1 patch Transdermal daily  pantoprazole    Tablet 40 milliGRAM(s) Oral before breakfast  phytonadione   Solution 5 milliGRAM(s) Oral daily  senna 2 Tablet(s) Oral at bedtime    MEDICATIONS  (PRN):  acetaminophen   Tablet .. 650 milliGRAM(s) Oral every 6 hours PRN Temp greater or equal to 38C (100.4F), Mild Pain (1 - 3)  ALBUTerol/ipratropium for Nebulization 3 milliLiter(s) Nebulizer every 8 hours PRN Bronchospasm  aluminum hydroxide/magnesium hydroxide/simethicone Suspension 30 milliLiter(s) Oral every 6 hours PRN Dyspepsia  guaiFENesin/dextromethorphan  Syrup 10 milliLiter(s) Oral every 4 hours PRN Cough  polyethylene glycol 3350 17 Gram(s) Oral daily PRN Constipation  traMADol 50 milliGRAM(s) Oral every 8 hours PRN Severe Pain (7 - 10)      Allergies    No Known Allergies    Intolerances        Vital Signs Last 24 Hrs  T(C): 36.9 (23 Sep 2019 15:47), Max: 37.1 (23 Sep 2019 00:16)  T(F): 98.5 (23 Sep 2019 15:47), Max: 98.8 (23 Sep 2019 00:16)  HR: 93 (23 Sep 2019 15:47) (79 - 93)  BP: 94/50 (23 Sep 2019 15:47) (91/50 - 100/52)  BP(mean): --  RR: 18 (23 Sep 2019 15:47) (15 - 18)  SpO2: 96% (23 Sep 2019 15:47) (94% - 96%)    PHYSICAL EXAM  General: adult in NAD  HEENT: clear oropharynx, anicteric sclera, pink conjunctiva  Neck: supple  CV: normal S1/S2 with no murmur rubs or gallops  Lungs: positive air movement b/l ant lungs,clear to auscultation, no wheezes, no rales  Abdomen: soft non-tender non-distended, no hepatosplenomegaly  Ext: no clubbing cyanosis or edema  Skin: no rashes and no petechiae  Neuro: alert and oriented X 4, no focal deficits  LABS:                          8.8    7.58  )-----------( 394      ( 23 Sep 2019 07:51 )             27.7         Mean Cell Volume : 94.9 fl  Mean Cell Hemoglobin : 30.1 pg  Mean Cell Hemoglobin Concentration : 31.8 gm/dL  Auto Neutrophil # : x  Auto Lymphocyte # : x  Auto Monocyte # : x  Auto Eosinophil # : x  Auto Basophil # : x  Auto Neutrophil % : x  Auto Lymphocyte % : x  Auto Monocyte % : x  Auto Eosinophil % : x  Auto Basophil % : x    Serial CBC  Hematocrit 27.7  Hemoglobin 8.8  Plat 394  RBC 2.92  WBC 7.58  Serial CBC  Hematocrit 26.1  Hemoglobin 8.4  Plat 335  RBC 2.76  WBC 7.66  Serial CBC  Hematocrit 29.0  Hemoglobin 9.3  Plat 340  RBC 3.07  WBC 7.71    09-23    135  |  100  |  7   ----------------------------<  72  3.6   |  30  |  0.62    Ca    8.8      23 Sep 2019 07:51            Reticulocyte Percent: 1.0 % (09-19 @ 16:00)            BLOOD SMEAR INTERPRETATION:       RADIOLOGY & ADDITIONAL STUDIES:

## 2019-09-23 NOTE — PROGRESS NOTE ADULT - PROBLEM SELECTOR PROBLEM 1
Anemia
Lung mass
Pneumonia of right lower lobe due to infectious organism
Sepsis
Anemia
Pneumonia of right lower lobe due to infectious organism
Lung mass
Pneumonia of right lower lobe due to infectious organism
Anemia
Pneumonia of right lower lobe due to infectious organism

## 2019-09-23 NOTE — PROGRESS NOTE ADULT - PROBLEM SELECTOR PLAN 2
H/H stable   S/p total 3 units PRBCS  GI Dr. Hernandez - underwent EGD/Colonoscopy which showed non bleeding ulcer, otherwise unremarkable. Anemia likely not from GI tract.    Hem Dr. Rosado  Daily CBC

## 2019-09-23 NOTE — PROGRESS NOTE ADULT - PROBLEM/PLAN-3
DISPLAY PLAN FREE TEXT
Walk in / drive in
DISPLAY PLAN FREE TEXT

## 2019-09-23 NOTE — PROGRESS NOTE ADULT - ATTENDING COMMENTS
Agree with above assessment and plan as outlined above.    - Abx per ID  - Pulm f/u    Rashid Roberts MD, [facility]  BEEPER [phone]
Agree with above assessment and plan as outlined above.    - Pulmonary f/u  - Abx per ID    Rashid Roberts MD, [facility]  BEEPER [phone]
Agree with above assessment and plan as outlined above.    - for lung bx with IR    Rashid Roberts MD, [facility]  BEEPER [phone]
Agree with above assessment and plan as outlined above.    - for possible lung bx with IR    Rashid Roberts MD, [facility]  BEEPER [phone]
CARDIOLOGY ATTENDING    Agree with above. No further inpatient cardiac workup needed given unremarkable echo and stress. F/U pulmonary
CARDIOLOGY ATTENDING    Agree with above. No further inpatient cardiac workup needed given unremarkable echo and stress. F/U pulmonary
Please refer to my note from today.
I agree with above
I agree with above
PATIENT IS SEEN AND EXAMINED    - RECURRENT ANEMIA IS MULTIFACTORIAL, INCLUDING INFECTION. HEMOLYSIS W/UP AND HEMATOLOGY F/UP AND TRANSFUSE 1 UNIT OF PRBC  - S/P EGD AND COLONOSCOPY  - S/P BRONCHOSCOPY , PROLONGED INR / PTT & BX COULD NOT BE DONE, HAD BAL. PATIENT IS EXPLAINED TO F/UP DR. DOZIER, PULMONARY CONSULTANT AS OUT PATIENT IN FEW WEEKS AFTER COMPLETING ANTIBIOTICS & RPT CHEST CT & LUNG BX IF NEEDED AS OUT PATIENT IN FEW WEEKS, TO EVALUATE FOR MALIGNANCY. AND ALSO TO F/UP DR. HERNANDEZ , ONCOLOGIST AS OUT PATIENT  -  DC PLAN / TRANSFER TO IN PATIENT REHAB  - MULTIFOCAL PNEUMONIA, SUSPECT ASPIRATION PNEUMONIA, BACTEREMIA  ON IV ZOSYN - TO COMPLETE TOTAL 2 WEEKS   - CHEST PAIN, PLEURITIC  ON TRAMADOL, TYLENOL  - PULMONARY F/UP IS IN PROGRESS. MAY NEED BRONCHOSCOPY  - SUBSTANCE ABUSE , SW TO COORDINATE WITH PATIENT FOR OUT PATIENT REHAB   - GI AND DVT PROPHYLAXIS  - DR. TOVAR
I agree with above
I agree with above
PATIENT IS SEEN AND EXAMINED  - MULTIFOCAL PNEUMONIA,SUSPECT ASPIRATION PNEUMONIA, KLEBSIELLA BACTEREMIA ON IV ZOSYN, STEROIDS (USE ONLY ORAL PREDNISONE 40 MG AND TAPER IT OFF ) F/UP REPEAT BLOOD CXS  - CHEST PAIN, PLEURITIC  ON TRAMADOL, TYLENOL  - GRADE 2 LV DIASTOLIC DYSFUNCTION   - PULMONARY F/UP IS IN PROGRESS. MAY NEED BRONCHOSCOPY  - SUBSTANCE ABUSE , SW TO COORDINATE WITH PATIENT FOR OUT PATIENT REHAB   - GI AND DVT PROPHYLAXIS  - DR. TOVAR
PATIENT WAS SEEN AND EXAMINED      - COAGULOPATHY DUE TO INFECTION AND NUTRITIONAL DEFICIENCY. S/P IV VIT K. ON VIT K PO  - S/P BRONCHOSCOPY , PROLONGED INR / PTT & BX COULD NOT BE DONE, HAD BAL. PATIENT IS EXPLAINED TO F/UP DR. DOZIER, PULMONARY CONSULTANT AS OUT PATIENT IN FEW WEEKS AFTER COMPLETING ANTIBIOTICS & RPT CHEST CT & LUNG BX IF NEEDED AS OUT PATIENT IN FEW WEEKS, TO EVALUATE FOR MALIGNANCY. AND ALSO TO F/UP DR. HERNANDEZ , ONCOLOGIST AS OUT PATIENT. RECOMMENDED LUNG Bx ONCE COAGULOPATHY IS RESOLVED  -  DC PLAN / TRANSFER TO IN PATIENT REHAB  - MULTIFOCAL PNEUMONIA, SUSPECT ASPIRATION PNEUMONIA, BACTEREMIA  ON IV ZOSYN - TO COMPLETE TOTAL 2 WEEKS   - CHEST PAIN, PLEURITIC  ON TRAMADOL, TYLENOL, METHADONE  - PULMONARY F/UP IS IN PROGRESS. MAY NEED BRONCHOSCOPY  - SUBSTANCE ABUSE , SOCIAL ISSUES AT HOME, SW TO COORDINATE WITH PATIENT FOR OUT PATIENT REHAB AND PLACEMENT  - GI AND DVT PROPHYLAXIS  - DR. TOVAR
PATIENT WAS EVALUATED BY THE TEAM AND HAD BRONCHOSCOPY. BIOPSY COULD NOT BE DONE BY THE PULMONOLOGIST DURING BRONCHOSCOPY. IR WILL HAVE TO GET LUNG BX FOR FURTHER EVALUATION.  - DR. TOVAR
PATIENT WAS SEEN AND EXAMINED    - PERSISTENT RIGHT LUNG LESION - SUSPECT MALIGNANCY - LUNG BIOPSY BY IR WAS CANCELLED WITH REASONABLE EXPLANATION BY IR TEAM. D/W ADMINISTRATION AND PULMONARY CONSULTANT. BRONCHOSCOPY IS PLANNED IN AM WITH BIOPSY. D/W PATIENT AND STAFF AT LENGTH.   - S/P COMPLETION F IV ANTIBIOTICS FOR PNEUMONIA  - S/P PSYCH EVALUATION  - AWAITING ON TRANSFER TO IN PATIENT PSYCH REHAB  - DR. TOVAR .
PATIENT WAS SEEN AND EXAMINED   - PERSISTENT RIGHT LUNG LESION - SUSPECT MALIGNANCY - AWAITING ON LUNG BIOPSY BY IR, IN AM   - S/P COMPLETION F IV ANTIBIOTICS FOR PNEUMONIA  - S/P PSYCH EVALUATION  - AWAITING ON TRANSFER TO IN PATIENT PSYCH REHAB  - DR. TOVAR .
PATIENT IS AWAITING ON PLACEMENT , AND PULMONARY & ONCOLOGY F/UP AS AN OUT PATIENT FOR FURTHER W/UP   - DR. TOVAR
PATIENT WAS SEEN AND EXAMINED   - PERSISTENT RIGHT LUNG LESION - SUSPECT MALIGNANCY - AWAITING ON LUNG BIOPSY BY IR   - S/P COMPLETION F IV ANTIBIOTICS FOR PNEUMONIA  - S/P PSYCH EVALUATION  - AWAITING ON TRANSFER TO IN PATIENT PSYCH REHAB  - DR. TOVAR
PATIENT IS SEEN AND EXAMINED  -  DC PLAN IF SW CAN GET PLACEMENT FOR IN PATIENT DRUG REHABILITATION  - MULTIFOCAL PNEUMONIA, SUSPECT ASPIRATION PNEUMONIA, BACTEREMIA  ON IV ZOSYN, STEROIDS ( USE  ORAL PREDNISONE 40 MG AND TAPER IT OFF )   - CHEST PAIN, PLEURITIC  ON TRAMADOL, TYLENOL  - PULMONARY F/UP IS IN PROGRESS. BRONCHOSCOPY, IF NEEDED AS OUT PATIENT WHEN RECOMMENDED BY DR. DOZIER. F/UP WITH DR. DOZIER , PULMONARY CONSULT AS OUT PATIENT.  - SUBSTANCE ABUSE , SW TO COORDINATE WITH PATIENT FOR DRUG REHAB   - GI AND DVT PROPHYLAXIS  - DR. TOVAR

## 2019-09-24 ENCOUNTER — TRANSCRIPTION ENCOUNTER (OUTPATIENT)
Age: 54
End: 2019-09-24

## 2019-09-24 VITALS
TEMPERATURE: 98 F | RESPIRATION RATE: 16 BRPM | DIASTOLIC BLOOD PRESSURE: 46 MMHG | SYSTOLIC BLOOD PRESSURE: 92 MMHG | HEART RATE: 70 BPM | OXYGEN SATURATION: 96 %

## 2019-09-24 LAB
ANION GAP SERPL CALC-SCNC: 6 MMOL/L — SIGNIFICANT CHANGE UP (ref 5–17)
APTT BLD: 58.4 SEC — HIGH (ref 27.5–36.3)
B2 MICROGLOB SERPL-MCNC: 3.8 MG/L — HIGH (ref 0.8–2.2)
BUN SERPL-MCNC: 8 MG/DL — SIGNIFICANT CHANGE UP (ref 7–18)
CALCIUM SERPL-MCNC: 8.8 MG/DL — SIGNIFICANT CHANGE UP (ref 8.4–10.5)
CHLORIDE SERPL-SCNC: 101 MMOL/L — SIGNIFICANT CHANGE UP (ref 96–108)
CO2 SERPL-SCNC: 30 MMOL/L — SIGNIFICANT CHANGE UP (ref 22–31)
CREAT SERPL-MCNC: 0.68 MG/DL — SIGNIFICANT CHANGE UP (ref 0.5–1.3)
GLUCOSE SERPL-MCNC: 83 MG/DL — SIGNIFICANT CHANGE UP (ref 70–99)
HCT VFR BLD CALC: 28.5 % — LOW (ref 34.5–45)
HGB BLD-MCNC: 9 G/DL — LOW (ref 11.5–15.5)
INR BLD: 1.48 RATIO — HIGH (ref 0.88–1.16)
KAPPA LC SER QL IFE: 12.45 MG/DL — HIGH (ref 0.33–1.94)
KAPPA/LAMBDA FREE LIGHT CHAIN RATIO, SERUM: 1.32 RATIO — SIGNIFICANT CHANGE UP (ref 0.26–1.65)
LAMBDA LC SER QL IFE: 9.46 MG/DL — HIGH (ref 0.57–2.63)
MCHC RBC-ENTMCNC: 29.9 PG — SIGNIFICANT CHANGE UP (ref 27–34)
MCHC RBC-ENTMCNC: 31.6 GM/DL — LOW (ref 32–36)
MCV RBC AUTO: 94.7 FL — SIGNIFICANT CHANGE UP (ref 80–100)
NRBC # BLD: 0 /100 WBCS — SIGNIFICANT CHANGE UP (ref 0–0)
PLATELET # BLD AUTO: 454 K/UL — HIGH (ref 150–400)
POTASSIUM SERPL-MCNC: 3.6 MMOL/L — SIGNIFICANT CHANGE UP (ref 3.5–5.3)
POTASSIUM SERPL-SCNC: 3.6 MMOL/L — SIGNIFICANT CHANGE UP (ref 3.5–5.3)
PROTHROM AB SERPL-ACNC: 16.6 SEC — HIGH (ref 10–12.9)
RBC # BLD: 3.01 M/UL — LOW (ref 3.8–5.2)
RBC # FLD: 14.4 % — SIGNIFICANT CHANGE UP (ref 10.3–14.5)
SODIUM SERPL-SCNC: 137 MMOL/L — SIGNIFICANT CHANGE UP (ref 135–145)
WBC # BLD: 9.38 K/UL — SIGNIFICANT CHANGE UP (ref 3.8–10.5)
WBC # FLD AUTO: 9.38 K/UL — SIGNIFICANT CHANGE UP (ref 3.8–10.5)

## 2019-09-24 RX ORDER — FOLIC ACID 0.8 MG
1 TABLET ORAL
Qty: 30 | Refills: 0
Start: 2019-09-24 | End: 2019-10-23

## 2019-09-24 RX ORDER — PHYTONADIONE (VIT K1) 5 MG
1 TABLET ORAL
Qty: 30 | Refills: 0
Start: 2019-09-24 | End: 2019-10-23

## 2019-09-24 RX ORDER — IPRATROPIUM/ALBUTEROL SULFATE 18-103MCG
2 AEROSOL WITH ADAPTER (GRAM) INHALATION
Qty: 2 | Refills: 0
Start: 2019-09-24

## 2019-09-24 RX ORDER — CHOLECALCIFEROL (VITAMIN D3) 125 MCG
1 CAPSULE ORAL
Qty: 30 | Refills: 0
Start: 2019-09-24 | End: 2019-10-23

## 2019-09-24 RX ORDER — NICOTINE POLACRILEX 2 MG
1 GUM BUCCAL
Qty: 14 | Refills: 0
Start: 2019-09-24 | End: 2019-10-07

## 2019-09-24 RX ORDER — PANTOPRAZOLE SODIUM 20 MG/1
1 TABLET, DELAYED RELEASE ORAL
Qty: 30 | Refills: 0
Start: 2019-09-24 | End: 2019-10-23

## 2019-09-24 RX ORDER — BACITRACIN ZINC 500 UNIT/G
1 OINTMENT IN PACKET (EA) TOPICAL DAILY
Refills: 0 | Status: DISCONTINUED | OUTPATIENT
Start: 2019-09-24 | End: 2019-09-24

## 2019-09-24 RX ORDER — LORATADINE 10 MG/1
1 TABLET ORAL
Qty: 0 | Refills: 0 | DISCHARGE
Start: 2019-09-24

## 2019-09-24 RX ADMIN — Medication 100 MILLIGRAM(S): at 11:15

## 2019-09-24 RX ADMIN — Medication 1000 UNIT(S): at 11:17

## 2019-09-24 RX ADMIN — METHADONE HYDROCHLORIDE 55 MILLIGRAM(S): 40 TABLET ORAL at 11:16

## 2019-09-24 RX ADMIN — Medication 10 MILLILITER(S): at 09:44

## 2019-09-24 RX ADMIN — Medication 5 MILLIGRAM(S): at 11:16

## 2019-09-24 RX ADMIN — Medication 1 APPLICATION(S): at 13:58

## 2019-09-24 RX ADMIN — Medication 1 PATCH: at 11:22

## 2019-09-24 RX ADMIN — Medication 1 PATCH: at 11:17

## 2019-09-24 RX ADMIN — LORATADINE 10 MILLIGRAM(S): 10 TABLET ORAL at 11:15

## 2019-09-24 RX ADMIN — Medication 1 MILLIGRAM(S): at 11:15

## 2019-09-24 RX ADMIN — Medication 10 MILLILITER(S): at 02:49

## 2019-09-24 RX ADMIN — POLYETHYLENE GLYCOL 3350 17 GRAM(S): 17 POWDER, FOR SOLUTION ORAL at 11:15

## 2019-09-24 RX ADMIN — Medication 10 MILLILITER(S): at 13:55

## 2019-09-24 RX ADMIN — TRAMADOL HYDROCHLORIDE 50 MILLIGRAM(S): 50 TABLET ORAL at 02:47

## 2019-09-24 RX ADMIN — PANTOPRAZOLE SODIUM 40 MILLIGRAM(S): 20 TABLET, DELAYED RELEASE ORAL at 05:40

## 2019-09-24 RX ADMIN — Medication 650 MILLIGRAM(S): at 10:15

## 2019-09-24 RX ADMIN — Medication 1 PATCH: at 09:31

## 2019-09-24 RX ADMIN — TRAMADOL HYDROCHLORIDE 50 MILLIGRAM(S): 50 TABLET ORAL at 03:10

## 2019-09-24 RX ADMIN — Medication 650 MILLIGRAM(S): at 09:42

## 2019-09-24 NOTE — PROGRESS NOTE ADULT - PROVIDER SPECIALTY LIST ADULT
Cardiology
Gastroenterology
Heme/Onc
Heme/Onc
Hepatology
Infectious Disease
Internal Medicine
Pulmonology
Infectious Disease
Cardiology
Cardiology
Internal Medicine

## 2019-09-24 NOTE — DISCHARGE NOTE NURSING/CASE MANAGEMENT/SOCIAL WORK - PATIENT PORTAL LINK FT
You can access the FollowMyHealth Patient Portal offered by [facility] by registering at the following website: [email or web address]. By joining [facility]’s FollowMyHealth portal, you will also be able to view your health information using other applications (apps) compatible with our system.

## 2019-09-24 NOTE — DISCHARGE NOTE NURSING/CASE MANAGEMENT/SOCIAL WORK - NSDCPEWEB_GEN_ALL_CORE
[facility] for Tobacco Control website --- [email or web address] website --- [email or web address]

## 2019-09-24 NOTE — PROGRESS NOTE ADULT - REASON FOR ADMISSION
SOB, Right sided CP, cough since yesterday morning

## 2019-09-24 NOTE — DISCHARGE NOTE NURSING/CASE MANAGEMENT/SOCIAL WORK - NSSBIRTDRGACTIVEREFTXDET_GEN_A_CORE
Pt. refused to answer other questions.  She wants to go into inpatient tx for heroin abuse directly from hospital.  She states that she was supposed to go into detox program through [facility] this Tues 9/3/19 but was here.

## 2019-09-24 NOTE — PROGRESS NOTE ADULT - SUBJECTIVE AND OBJECTIVE BOX
Time of Visit:  Patient seen and examined.     MEDICATIONS  (STANDING):  ALBUTerol/ipratropium for Nebulization 3 milliLiter(s) Nebulizer every 6 hours  BACItracin   Ointment 1 Application(s) Topical daily  cholecalciferol 1000 Unit(s) Oral daily  docusate sodium 100 milliGRAM(s) Oral daily  folic acid 1 milliGRAM(s) Oral daily  influenza   Vaccine 0.5 milliLiter(s) IntraMuscular once  loratadine 10 milliGRAM(s) Oral daily  methadone    Tablet 55 milliGRAM(s) Oral daily  nicotine - 21 mG/24Hr(s) Patch 1 patch Transdermal daily  pantoprazole    Tablet 40 milliGRAM(s) Oral before breakfast  phytonadione   Solution 5 milliGRAM(s) Oral daily  senna 2 Tablet(s) Oral at bedtime      MEDICATIONS  (PRN):  acetaminophen   Tablet .. 650 milliGRAM(s) Oral every 6 hours PRN Temp greater or equal to 38C (100.4F), Mild Pain (1 - 3)  ALBUTerol/ipratropium for Nebulization 3 milliLiter(s) Nebulizer every 8 hours PRN Bronchospasm  aluminum hydroxide/magnesium hydroxide/simethicone Suspension 30 milliLiter(s) Oral every 6 hours PRN Dyspepsia  guaiFENesin/dextromethorphan  Syrup 10 milliLiter(s) Oral every 4 hours PRN Cough  polyethylene glycol 3350 17 Gram(s) Oral daily PRN Constipation  traMADol 50 milliGRAM(s) Oral every 8 hours PRN Severe Pain (7 - 10)       Medications up to date at time of exam.    ROS; No fever, chills, cough, SOB, congestion.   PHYSICAL EXAMINATION:    Vital Signs Last 24 Hrs  T(C): 36.9 (24 Sep 2019 07:41), Max: 37.6 (24 Sep 2019 02:48)  T(F): 98.4 (24 Sep 2019 07:41), Max: 99.6 (24 Sep 2019 02:48)  HR: 70 (24 Sep 2019 07:41) (70 - 93)  BP: 92/46 (24 Sep 2019 07:41) (92/46 - 110/53)  BP(mean): --  RR: 16 (24 Sep 2019 07:41) (16 - 18)  SpO2: 96% (24 Sep 2019 07:41) (93% - 96%)   (if applicable)    General: Alert and oriented. No acute distress. Able to answer question with no SOB.     HEENT: Head is normocephalic and atraumatic. No nasal tenderness. Extraocular muscles are intact. Moist mucosa.     NECK: Supple, no palpable adenopathy.    LUNGS: Clear to auscultation, no wheezing, rales, or rhonchi. No use of accessory muscle.     HEART:  S1 S2 regular. No click/ rub.     ABDOMEN: Soft, nontender, and nondistended.  No abdominal guarding. Active bowel sounds.     EXTREMITIES: Without any cyanosis, clubbing, rash, lesions or edema.    NEUROLOGIC: Awake, alert, oriented.     SKIN: Warm and moist. Non diaphoretic.   LABS:                        9.0    9.38  )-----------( 454      ( 24 Sep 2019 07:12 )             28.5     09-24    137  |  101  |  8   ----------------------------<  83  3.6   |  30  |  0.68    Ca    8.8      24 Sep 2019 07:12      PT/INR - ( 24 Sep 2019 07:12 )   PT: 16.6 sec;   INR: 1.48 ratio         PTT - ( 24 Sep 2019 07:12 )  PTT:58.4 sec      RADIOLOGY & ADDITIONAL STUDIES:  EKG:   CT Chest: < from: CT Chest No Cont (09.10.19 @ 09:26) >    EXAM:  CT CHEST                            PROCEDURE DATE:  09/10/2019          INTERPRETATION:  CLINICAL INDICATION: 54 years  Female with TO EVALUATE   RIGHT LUNG LESIONS  AS ABOVE. Pneumonia.    COMPARISON: 9/2/2019    PROCEDURE:   CT of the Chest was performed without intravenous contrast.  Sagittal and coronal reformats were performed.      FINDINGS:    LUNGS AND AIRWAYS: Persistent dense consolidation involving most of the   right middle lobe extending into the right lower lobe. Right lower lobe   air bronchograms present. Diffuse thickening of the bronchovascular   interstitium within the right lung. Right lower lobe dependent   atelectasis. Mild discoid atelectasis in the left lower lobe. Mild   bilateral upper lobe emphysema.    PLEURA: Trace right pleural effusion. No left pleural effusion.    MEDIASTINUM AND GÓMEZ: Right upper paratracheal lymph node measuring 7 mm   in short axis (3:37). Right upper paratracheal lymph node measuring 9 mm   in short axis (3:49). Right lower paratracheal lymph node measuring 1 cm   in short axis (3:58). Enlarged subcarinal lymph node measuring 2.2 cm in   short axis (3:71). Extension of right lung consolidation into the right   hilum. Limited evaluation for vascular invasion or constrictionsecondary   to lack of IV contrast. Complete opacification of the right middle lobe   bronchi.    VESSELS: No evidence of aortic aneurysm. Please see Steinmann hilar   findings.    HEART: Heart size is normal. Trace pericardial effusion.    CHEST WALL AND LOWER NECK: Within normal limits.    VISUALIZED UPPER ABDOMEN: Suspected splenomegaly. The spleen is   incompletely imaged. The adrenal glands are incompletely imaged.    BONES: Mild cervical and thoracic degenerative changes.    IMPRESSION:     Dense consolidation in the right middle and lower lobe with diffuse right   lung bronchovascular thickening as well as mediastinal adenopathy.   Complete opacification of the right middle lobe bronchi. Findings   concerning for underlying neoplasm.    Extension of right lung consolidation into the right hilum. Limited   evaluation for vascular invasion or constriction secondary to lack of IV   contrast.    Mild emphysema.    IMPRESSION: 54y Female PAST MEDICAL & SURGICAL HISTORY:  Asthma  No significant past surgical history     Impression; 53 Y/O Female presented with SOB, Cough, Chest Pain. CT chest show RML bronchus obstruction with mediastinal adenopathy may be due to pan vs malignancy. Repeated CT chest shows no change in lung consolidation vs mass. +klebsiella pneum bacteremia 9/3 repeat BC 9/5 neg. HIV negative. IR refused bx . s/p Bronchoscopy 09-13-19 , no Biopsy done and EBUS was not attempted due to coagulopathy. Cytology of BAL Negative for Malignancy.     Suggestion;  O2 saturation 98% room air. No need for outpatient Oxygen supplementation due to been saturating good room air.    Reinforced the importance of smoking cessation and Daily compliance to Daily medications.   Continue Duoneb Q 6 hours. Discontinue PRN DuoNeb Q 8 hours. Can have oral inhaler on discharge .   Discharge planning per primary Team. Time of Visit:  Patient seen and examined.     MEDICATIONS  (STANDING):  ALBUTerol/ipratropium for Nebulization 3 milliLiter(s) Nebulizer every 6 hours  BACItracin   Ointment 1 Application(s) Topical daily  cholecalciferol 1000 Unit(s) Oral daily  docusate sodium 100 milliGRAM(s) Oral daily  folic acid 1 milliGRAM(s) Oral daily  influenza   Vaccine 0.5 milliLiter(s) IntraMuscular once  loratadine 10 milliGRAM(s) Oral daily  methadone    Tablet 55 milliGRAM(s) Oral daily  nicotine - 21 mG/24Hr(s) Patch 1 patch Transdermal daily  pantoprazole    Tablet 40 milliGRAM(s) Oral before breakfast  phytonadione   Solution 5 milliGRAM(s) Oral daily  senna 2 Tablet(s) Oral at bedtime      MEDICATIONS  (PRN):  acetaminophen   Tablet .. 650 milliGRAM(s) Oral every 6 hours PRN Temp greater or equal to 38C (100.4F), Mild Pain (1 - 3)  ALBUTerol/ipratropium for Nebulization 3 milliLiter(s) Nebulizer every 8 hours PRN Bronchospasm  aluminum hydroxide/magnesium hydroxide/simethicone Suspension 30 milliLiter(s) Oral every 6 hours PRN Dyspepsia  guaiFENesin/dextromethorphan  Syrup 10 milliLiter(s) Oral every 4 hours PRN Cough  polyethylene glycol 3350 17 Gram(s) Oral daily PRN Constipation  traMADol 50 milliGRAM(s) Oral every 8 hours PRN Severe Pain (7 - 10)       Medications up to date at time of exam.    ROS; No fever, chills, cough, SOB, congestion.   PHYSICAL EXAMINATION:    Vital Signs Last 24 Hrs  T(C): 36.9 (24 Sep 2019 07:41), Max: 37.6 (24 Sep 2019 02:48)  T(F): 98.4 (24 Sep 2019 07:41), Max: 99.6 (24 Sep 2019 02:48)  HR: 70 (24 Sep 2019 07:41) (70 - 93)  BP: 92/46 (24 Sep 2019 07:41) (92/46 - 110/53)  BP(mean): --  RR: 16 (24 Sep 2019 07:41) (16 - 18)  SpO2: 96% (24 Sep 2019 07:41) (93% - 96%)   (if applicable)    General: Alert and oriented. No acute distress. Able to answer question with no SOB.     HEENT: Head is normocephalic and atraumatic. No nasal tenderness. Extraocular muscles are intact. Moist mucosa.     NECK: Supple, no palpable adenopathy.    LUNGS: Clear to auscultation, no wheezing, rales, or rhonchi. No use of accessory muscle.     HEART:  S1 S2 regular. No click/ rub.     ABDOMEN: Soft, nontender, and nondistended.  No abdominal guarding. Active bowel sounds.     EXTREMITIES: Without any cyanosis, clubbing, rash, lesions or edema.    NEUROLOGIC: Awake, alert, oriented.     SKIN: Warm and moist. Non diaphoretic.   LABS:                        9.0    9.38  )-----------( 454      ( 24 Sep 2019 07:12 )             28.5     09-24    137  |  101  |  8   ----------------------------<  83  3.6   |  30  |  0.68    Ca    8.8      24 Sep 2019 07:12      PT/INR - ( 24 Sep 2019 07:12 )   PT: 16.6 sec;   INR: 1.48 ratio         PTT - ( 24 Sep 2019 07:12 )  PTT:58.4 sec      RADIOLOGY & ADDITIONAL STUDIES:  EKG:   CT Chest: < from: CT Chest No Cont (09.10.19 @ 09:26) >    EXAM:  CT CHEST                            PROCEDURE DATE:  09/10/2019          INTERPRETATION:  CLINICAL INDICATION: 54 years  Female with TO EVALUATE   RIGHT LUNG LESIONS  AS ABOVE. Pneumonia.    COMPARISON: 9/2/2019    PROCEDURE:   CT of the Chest was performed without intravenous contrast.  Sagittal and coronal reformats were performed.      FINDINGS:    LUNGS AND AIRWAYS: Persistent dense consolidation involving most of the   right middle lobe extending into the right lower lobe. Right lower lobe   air bronchograms present. Diffuse thickening of the bronchovascular   interstitium within the right lung. Right lower lobe dependent   atelectasis. Mild discoid atelectasis in the left lower lobe. Mild   bilateral upper lobe emphysema.    PLEURA: Trace right pleural effusion. No left pleural effusion.    MEDIASTINUM AND GÓMEZ: Right upper paratracheal lymph node measuring 7 mm   in short axis (3:37). Right upper paratracheal lymph node measuring 9 mm   in short axis (3:49). Right lower paratracheal lymph node measuring 1 cm   in short axis (3:58). Enlarged subcarinal lymph node measuring 2.2 cm in   short axis (3:71). Extension of right lung consolidation into the right   hilum. Limited evaluation for vascular invasion or constrictionsecondary   to lack of IV contrast. Complete opacification of the right middle lobe   bronchi.    VESSELS: No evidence of aortic aneurysm. Please see Steinmann hilar   findings.    HEART: Heart size is normal. Trace pericardial effusion.    CHEST WALL AND LOWER NECK: Within normal limits.    VISUALIZED UPPER ABDOMEN: Suspected splenomegaly. The spleen is   incompletely imaged. The adrenal glands are incompletely imaged.    BONES: Mild cervical and thoracic degenerative changes.    IMPRESSION:     Dense consolidation in the right middle and lower lobe with diffuse right   lung bronchovascular thickening as well as mediastinal adenopathy.   Complete opacification of the right middle lobe bronchi. Findings   concerning for underlying neoplasm.    Extension of right lung consolidation into the right hilum. Limited   evaluation for vascular invasion or constriction secondary to lack of IV   contrast.    Mild emphysema.    IMPRESSION: 54y Female PAST MEDICAL & SURGICAL HISTORY:  Asthma  No significant past surgical history     Impression; 55 Y/O Female presented with SOB, Cough, Chest Pain. CT chest show RML bronchus obstruction with mediastinal adenopathy may be due to pan vs malignancy. Repeated CT chest shows no change in lung consolidation vs mass. +klebsiella pneum bacteremia 9/3 repeat BC 9/5 neg. HIV negative. IR refused bx . s/p Bronchoscopy 09-13-19 , no Biopsy done and EBUS was not attempted due to coagulopathy. Cytology of BAL Negative for Malignancy.     Suggestion;  O2 saturation 98% room air. No need for outpatient Oxygen supplementation due to been saturating good room air.    Reinforced the importance of smoking cessation and Daily compliance to Daily medications.   Continue Duoneb Q 6 hours. Discontinue PRN DuoNeb Q 8 hours. Can have oral inhaler on discharge .   Discharge planning per primary Team.    out pat pul f/u  repeat CT chest in 4 weeks

## 2019-09-24 NOTE — DISCHARGE NOTE NURSING/CASE MANAGEMENT/SOCIAL WORK - NSDCPEEMAIL_GEN_ALL_CORE
[facility] for Tobacco Control email [email or web address]

## 2019-09-27 LAB
DEPRECATED KAPPA LC FREE/LAMBDA SER: 8.51 — SIGNIFICANT CHANGE UP (ref 2.04–10.37)
KAPPA LC UR-MCNC: 470 MG/L — HIGH (ref 1.35–24.19)
LAMBDA LC UR-MCNC: 55.2 MG/L — HIGH (ref 0.24–6.66)

## 2019-09-30 PROBLEM — Z00.00 ENCOUNTER FOR PREVENTIVE HEALTH EXAMINATION: Status: ACTIVE | Noted: 2019-09-30

## 2019-10-01 ENCOUNTER — OUTPATIENT (OUTPATIENT)
Dept: OUTPATIENT SERVICES | Facility: HOSPITAL | Age: 54
LOS: 1 days | End: 2019-10-01
Payer: MEDICAID

## 2019-10-01 PROBLEM — J45.909 UNSPECIFIED ASTHMA, UNCOMPLICATED: Chronic | Status: ACTIVE | Noted: 2019-09-02

## 2019-10-01 PROCEDURE — G9001: CPT

## 2019-10-10 ENCOUNTER — APPOINTMENT (OUTPATIENT)
Dept: THORACIC SURGERY | Facility: CLINIC | Age: 54
End: 2019-10-10
Payer: MEDICAID

## 2019-10-10 VITALS
HEIGHT: 64 IN | TEMPERATURE: 98.3 F | DIASTOLIC BLOOD PRESSURE: 67 MMHG | OXYGEN SATURATION: 96 % | WEIGHT: 137 LBS | SYSTOLIC BLOOD PRESSURE: 111 MMHG | BODY MASS INDEX: 23.39 KG/M2 | RESPIRATION RATE: 17 BRPM | HEART RATE: 82 BPM

## 2019-10-10 DIAGNOSIS — Z87.09 PERSONAL HISTORY OF OTHER DISEASES OF THE RESPIRATORY SYSTEM: ICD-10-CM

## 2019-10-10 DIAGNOSIS — F17.200 NICOTINE DEPENDENCE, UNSPECIFIED, UNCOMPLICATED: ICD-10-CM

## 2019-10-10 DIAGNOSIS — Z72.89 OTHER PROBLEMS RELATED TO LIFESTYLE: ICD-10-CM

## 2019-10-10 DIAGNOSIS — Z71.89 OTHER SPECIFIED COUNSELING: ICD-10-CM

## 2019-10-10 DIAGNOSIS — Z78.9 OTHER SPECIFIED HEALTH STATUS: ICD-10-CM

## 2019-10-10 PROCEDURE — 99205 OFFICE O/P NEW HI 60 MIN: CPT

## 2019-10-11 NOTE — PHYSICAL EXAM
[General Appearance - Well Nourished] : well nourished [General Appearance - In No Acute Distress] : in no acute distress [General Appearance - Alert] : alert [PERRL With Normal Accommodation] : pupils were equal in size, round, and reactive to light [Sclera] : the sclera and conjunctiva were normal [Outer Ear] : the ears and nose were normal in appearance [Neck Appearance] : the appearance of the neck was normal [Neck Cervical Mass (___cm)] : no neck mass was observed [Auscultation Breath Sounds / Voice Sounds] : lungs were clear to auscultation bilaterally [Respiration, Rhythm And Depth] : normal respiratory rhythm and effort [Heart Sounds] : normal S1 and S2 [Heart Rate And Rhythm] : heart rate was normal and rhythm regular [Examination Of The Chest] : the chest was normal in appearance [2+] : right 2+ [Bowel Sounds] : normal bowel sounds [No Pulse Delay] : no pulse delay [Abdomen Tenderness] : non-tender [Abdomen Soft] : soft [No CVA Tenderness] : no ~M costovertebral angle tenderness [Involuntary Movements] : no involuntary movements were seen [Abnormal Walk] : normal gait [Skin Turgor] : normal skin turgor [Skin Color & Pigmentation] : normal skin color and pigmentation [] : no rash [Affect] : the affect was normal [Oriented To Time, Place, And Person] : oriented to person, place, and time [No Focal Deficits] : no focal deficits [Mood] : the mood was normal [Breast Appearance] : normal in appearance [Breast Palpation Mass] : no palpable masses [FreeTextEntry1] : deferred [Cervical Lymph Nodes Enlarged Posterior Bilaterally] : posterior cervical [Cervical Lymph Nodes Enlarged Anterior Bilaterally] : anterior cervical [Supraclavicular Lymph Nodes Enlarged Bilaterally] : supraclavicular

## 2019-10-11 NOTE — HISTORY OF PRESENT ILLNESS
[FreeTextEntry1] : 53 y/o female, current everyday smoker for 40 years, with PMhx of asthma, heroin user ( quit heroin on 8/3/19, currently use methadone 55mg QD ), presented to ED with SOB, cough with white sputum, wheezing and right sided chest pain x 1 day on 9/2/19.\par \par CXR on 9/2/19 revealed consolation of RLL, most likely PNA. \par CT chest on 9/2/19 revealed soft tissue impaction of the RML and RLL bronchi. Extensive airspace consolidation of the RML and RLL. Overall findings may reflect severe multifocal PNA. \par \par Repeating CT chest on 9/10/19: dense consolidation in the RML and RLL with diffuse right lung bronchovascular thickening as well as mediastinal adenopathy. Complete opacification of the RML bronchi. \par \par Bronchoscopy, BAL on 9/13/19, path was negative for malignant cell. \par She was transferred to medicine for management of multifocal PNA and Klebsiella PA, as well as being followed by ID. \par \par She presents today for consultation. She admits SOB on exertion, cough on and off. No chest pain, weight loss or hemoptysis. \par

## 2019-10-11 NOTE — ASSESSMENT
[FreeTextEntry1] : 53 y/o female, current everyday smoker for 40 years, with PMhx of asthma, heroin user ( quit heroin on 8/3/19, currently use methadone 55mg QD ), presented to ED with SOB, cough with white sputum, wheezing and right sided chest pain x 1 day on 9/2/19.\par \par CXR on 9/2/19 revealed consolation of RLL, most likely PNA. \par CT chest on 9/2/19 revealed soft tissue impaction of the RML and RLL bronchi. Extensive airspace consolidation of the RML and RLL. Overall findings may reflect severe multifocal PNA. \par \par Repeating CT chest on 9/10/19: dense consolidation in the RML and RLL with diffuse right lung bronchovascular thickening as well as mediastinal adenopathy. Complete opacification of the RML bronchi. \par \par Bronchoscopy, BAL on 9/13/19, path was negative for malignant cell. \par She was transferred to medicine for management of multifocal PNA and Klebsiella PA, as well as being followed by ID. \par \par I have reviewed the patient's medical records and diagnostic images at the time of this office consultation and have made the following recommendation.\par Plan:\par 1. PET/CT to evaluate lung opacity.\par 2. RTC after PET done. \par \par \par \par \par \par Written by Dana Lopez NP, acting as a scribe for Dr. Shaan Sotomayor. \par “The documentation recorded by the scribe accurately reflects the service I personally performed and the decisions made by me.” Shaan Sotomayor MD.\par

## 2019-10-11 NOTE — DATA REVIEWED
[FreeTextEntry1] : CXR on 9/2/19 revealed consolation of RLL, most likely PNA. \par CT chest on 9/2/19 revealed soft tissue impaction of the RML and RLL bronchi. Extensive airspace consolidation of the RML and RLL. Overall findings may reflect severe multifocal PNa. \par \par Repeating CT chest on 9/10/19: dense consolidation in the RML and RLL with diffuse right lung bronchovascular thickening as well as mediastinal adenopathy. Complete opacification of the RML bronchi.

## 2019-10-12 LAB
CULTURE RESULTS: SIGNIFICANT CHANGE UP
SPECIMEN SOURCE: SIGNIFICANT CHANGE UP

## 2019-10-31 ENCOUNTER — FORM ENCOUNTER (OUTPATIENT)
Age: 54
End: 2019-10-31

## 2019-10-31 PROCEDURE — 86022 PLATELET ANTIBODIES: CPT

## 2019-10-31 PROCEDURE — 85610 PROTHROMBIN TIME: CPT

## 2019-10-31 PROCEDURE — 82728 ASSAY OF FERRITIN: CPT

## 2019-10-31 PROCEDURE — 93005 ELECTROCARDIOGRAM TRACING: CPT

## 2019-10-31 PROCEDURE — 99285 EMERGENCY DEPT VISIT HI MDM: CPT | Mod: 25

## 2019-10-31 PROCEDURE — 87070 CULTURE OTHR SPECIMN AEROBIC: CPT

## 2019-10-31 PROCEDURE — 80048 BASIC METABOLIC PNL TOTAL CA: CPT

## 2019-10-31 PROCEDURE — 36430 TRANSFUSION BLD/BLD COMPNT: CPT

## 2019-10-31 PROCEDURE — P9040: CPT

## 2019-10-31 PROCEDURE — 78452 HT MUSCLE IMAGE SPECT MULT: CPT

## 2019-10-31 PROCEDURE — 87086 URINE CULTURE/COLONY COUNT: CPT

## 2019-10-31 PROCEDURE — A9502: CPT

## 2019-10-31 PROCEDURE — 71250 CT THORAX DX C-: CPT

## 2019-10-31 PROCEDURE — 86900 BLOOD TYPING SEROLOGIC ABO: CPT

## 2019-10-31 PROCEDURE — 83550 IRON BINDING TEST: CPT

## 2019-10-31 PROCEDURE — 85027 COMPLETE CBC AUTOMATED: CPT

## 2019-10-31 PROCEDURE — 80074 ACUTE HEPATITIS PANEL: CPT

## 2019-10-31 PROCEDURE — 85730 THROMBOPLASTIN TIME PARTIAL: CPT

## 2019-10-31 PROCEDURE — 80053 COMPREHEN METABOLIC PANEL: CPT

## 2019-10-31 PROCEDURE — 87116 MYCOBACTERIA CULTURE: CPT

## 2019-10-31 PROCEDURE — 81025 URINE PREGNANCY TEST: CPT

## 2019-10-31 PROCEDURE — 85045 AUTOMATED RETICULOCYTE COUNT: CPT

## 2019-10-31 PROCEDURE — 83010 ASSAY OF HAPTOGLOBIN QUANT: CPT

## 2019-10-31 PROCEDURE — 86901 BLOOD TYPING SEROLOGIC RH(D): CPT

## 2019-10-31 PROCEDURE — 80076 HEPATIC FUNCTION PANEL: CPT

## 2019-10-31 PROCEDURE — 93017 CV STRESS TEST TRACING ONLY: CPT

## 2019-10-31 PROCEDURE — 84100 ASSAY OF PHOSPHORUS: CPT

## 2019-10-31 PROCEDURE — 76700 US EXAM ABDOM COMPLETE: CPT

## 2019-10-31 PROCEDURE — 88305 TISSUE EXAM BY PATHOLOGIST: CPT

## 2019-10-31 PROCEDURE — 81001 URINALYSIS AUTO W/SCOPE: CPT

## 2019-10-31 PROCEDURE — 88312 SPECIAL STAINS GROUP 1: CPT

## 2019-10-31 PROCEDURE — 83735 ASSAY OF MAGNESIUM: CPT

## 2019-10-31 PROCEDURE — 87389 HIV-1 AG W/HIV-1&-2 AB AG IA: CPT

## 2019-10-31 PROCEDURE — 87040 BLOOD CULTURE FOR BACTERIA: CPT

## 2019-10-31 PROCEDURE — 84484 ASSAY OF TROPONIN QUANT: CPT

## 2019-10-31 PROCEDURE — 87015 SPECIMEN INFECT AGNT CONCNTJ: CPT

## 2019-10-31 PROCEDURE — 84156 ASSAY OF PROTEIN URINE: CPT

## 2019-10-31 PROCEDURE — 83036 HEMOGLOBIN GLYCOSYLATED A1C: CPT

## 2019-10-31 PROCEDURE — 86325 OTHER IMMUNOELECTROPHORESIS: CPT

## 2019-10-31 PROCEDURE — 94640 AIRWAY INHALATION TREATMENT: CPT

## 2019-10-31 PROCEDURE — 86850 RBC ANTIBODY SCREEN: CPT

## 2019-10-31 PROCEDURE — 87150 DNA/RNA AMPLIFIED PROBE: CPT

## 2019-10-31 PROCEDURE — 83521 IG LIGHT CHAINS FREE EACH: CPT

## 2019-10-31 PROCEDURE — 84550 ASSAY OF BLOOD/URIC ACID: CPT

## 2019-10-31 PROCEDURE — 71045 X-RAY EXAM CHEST 1 VIEW: CPT

## 2019-10-31 PROCEDURE — 82962 GLUCOSE BLOOD TEST: CPT

## 2019-10-31 PROCEDURE — 82306 VITAMIN D 25 HYDROXY: CPT

## 2019-10-31 PROCEDURE — 83540 ASSAY OF IRON: CPT

## 2019-10-31 PROCEDURE — 80061 LIPID PANEL: CPT

## 2019-10-31 PROCEDURE — 86923 COMPATIBILITY TEST ELECTRIC: CPT

## 2019-10-31 PROCEDURE — 87340 HEPATITIS B SURFACE AG IA: CPT

## 2019-10-31 PROCEDURE — 87102 FUNGUS ISOLATION CULTURE: CPT

## 2019-10-31 PROCEDURE — 87186 SC STD MICRODIL/AGAR DIL: CPT

## 2019-10-31 PROCEDURE — 87522 HEPATITIS C REVRS TRNSCRPJ: CPT

## 2019-10-31 PROCEDURE — 82232 ASSAY OF BETA-2 PROTEIN: CPT

## 2019-10-31 PROCEDURE — 87206 SMEAR FLUORESCENT/ACID STAI: CPT

## 2019-10-31 PROCEDURE — 82746 ASSAY OF FOLIC ACID SERUM: CPT

## 2019-10-31 PROCEDURE — 88112 CYTOPATH CELL ENHANCE TECH: CPT

## 2019-10-31 PROCEDURE — 83615 LACTATE (LD) (LDH) ENZYME: CPT

## 2019-10-31 PROCEDURE — 93306 TTE W/DOPPLER COMPLETE: CPT

## 2019-10-31 PROCEDURE — 36415 COLL VENOUS BLD VENIPUNCTURE: CPT

## 2019-10-31 PROCEDURE — 89051 BODY FLUID CELL COUNT: CPT

## 2019-10-31 PROCEDURE — 86334 IMMUNOFIX E-PHORESIS SERUM: CPT

## 2019-10-31 PROCEDURE — 82607 VITAMIN B-12: CPT

## 2019-10-31 PROCEDURE — 82272 OCCULT BLD FECES 1-3 TESTS: CPT

## 2019-10-31 PROCEDURE — 84443 ASSAY THYROID STIM HORMONE: CPT

## 2019-11-01 ENCOUNTER — APPOINTMENT (OUTPATIENT)
Dept: NUCLEAR MEDICINE | Facility: IMAGING CENTER | Age: 54
End: 2019-11-01
Payer: MEDICAID

## 2019-11-01 ENCOUNTER — OUTPATIENT (OUTPATIENT)
Dept: OUTPATIENT SERVICES | Facility: HOSPITAL | Age: 54
LOS: 1 days | End: 2019-11-01
Payer: MEDICAID

## 2019-11-01 DIAGNOSIS — R91.1 SOLITARY PULMONARY NODULE: ICD-10-CM

## 2019-11-01 PROCEDURE — 78815 PET IMAGE W/CT SKULL-THIGH: CPT | Mod: 26,PI

## 2019-11-01 PROCEDURE — A9552: CPT

## 2019-11-01 PROCEDURE — 78815 PET IMAGE W/CT SKULL-THIGH: CPT

## 2019-11-02 LAB
CULTURE RESULTS: SIGNIFICANT CHANGE UP
SPECIMEN SOURCE: SIGNIFICANT CHANGE UP

## 2019-11-06 PROBLEM — J18.9 MULTIFOCAL PNEUMONIA: Status: ACTIVE | Noted: 2019-10-10

## 2019-11-07 ENCOUNTER — APPOINTMENT (OUTPATIENT)
Dept: THORACIC SURGERY | Facility: CLINIC | Age: 54
End: 2019-11-07
Payer: MEDICAID

## 2019-11-07 VITALS
HEART RATE: 85 BPM | WEIGHT: 127 LBS | SYSTOLIC BLOOD PRESSURE: 99 MMHG | DIASTOLIC BLOOD PRESSURE: 84 MMHG | TEMPERATURE: 97.8 F | BODY MASS INDEX: 21.8 KG/M2 | RESPIRATION RATE: 17 BRPM | OXYGEN SATURATION: 95 %

## 2019-11-07 DIAGNOSIS — J18.9 PNEUMONIA, UNSPECIFIED ORGANISM: ICD-10-CM

## 2019-11-07 PROCEDURE — 99214 OFFICE O/P EST MOD 30 MIN: CPT

## 2019-11-11 NOTE — DATA REVIEWED
[FreeTextEntry1] : PET/CT on 11/01/2019: \par - few small FDG-avid LNs: right supraclavicular measuring up to 1.2 X 0.7cm with SUV 2.2 (image 54); 1.1 x 0.9cm right paratracheal node with SUV 2.7 (image 67), subcarinal LN with SUV 3.5 (image 84), right perihilar lymph nodes. \par - RML and RLL dense consolidation with heterogeneous SUV 14.2 (image 98), with large, hypodense area of photopenia, which measures up to approximately 3.5 cm (image 95), concerning for multifocal pneumonia with abscess. \par - mildly FDG-avid infiltration of the right anterolateral chest wall at the level of the 7th-8th intercostal space (SUV 3.3; image 111).\par - mild bilateral upper lobe emphysema\par - mildly FDG-avid right pleural thickening, trace right pleural effusion\par - mildly FDG-avid right pleural thickening, trace right pleural effusion\par

## 2019-11-11 NOTE — HISTORY OF PRESENT ILLNESS
[FreeTextEntry1] : 55 y/o female, current everyday smoker for 40 years, with hx of asthma, heroin user (quit heroin on 8/3/19, currently uses methadone 55mg QD), presented to ED with SOB, cough with white sputum, wheezing, and right sided chest pain x 1 day on 9/2/19, patient was hospitalized and treated for PNA.\par \par CT chest on 9/2/19 revealed soft tissue impaction of the RML and RLL bronchi. Extensive airspace consolidation of the RML and RLL. Overall findings may reflect severe multifocal PNA. \par \par Repeating CT chest on 9/10/19: dense consolidation in the RML and RLL with diffuse right lung bronchovascular thickening as well as mediastinal adenopathy. Complete opacification of the RML bronchi. \par \par Flex Bronch w/ BAL on 9/13/19, path of Rt BAL negative for malignant cell. \par She was transferred to medicine for management of multifocal PNA and Klebsiella PA, as well as being followed by ID. \par \par PET/CT on 11/01/2019: \par - few small FDG-avid LNs: right supraclavicular measuring up to 1.2 X 0.7cm with SUV 2.2 (image 54); 1.1 x 0.9cm right paratracheal node with SUV 2.7 (image 67), subcarinal LN with SUV 3.5 (image 84), right perihilar lymph nodes. \par - RML and RLL dense consolidation with heterogeneous SUV 14.2 (image 98), with large, hypodense area of photopenia, which measures up to approximately 3.5 cm (image 95), concerning for multifocal pneumonia with abscess. \par - mildly FDG-avid infiltration of the right anterolateral chest wall at the level of the 7th-8th intercostal space (SUV 3.3; image 111).\par - mild bilateral upper lobe emphysema\par - mildly FDG-avid right pleural thickening, trace right pleural effusion\par \par Patient is here today for a follow up. Admits to SOB, chest pain, and on/off productive cough.\par

## 2019-11-11 NOTE — PHYSICAL EXAM
[Heart Rate And Rhythm] : heart rate was normal and rhythm regular [Heart Sounds] : normal S1 and S2 [Heart Sounds Gallop] : no gallops [Heart Sounds Pericardial Friction Rub] : no pericardial rub [Murmurs] : no murmurs [Diminished Respiratory Excursion] : normal chest expansion [Examination Of The Chest] : the chest was normal in appearance [Chest Visual Inspection Thoracic Asymmetry] : no chest asymmetry [Bowel Sounds] : normal bowel sounds [Abdomen Tenderness] : non-tender [Abdomen Soft] : soft [Skin Turgor] : normal skin turgor [Skin Color & Pigmentation] : normal skin color and pigmentation [Abdomen Mass (___ Cm)] : no abdominal mass palpated [Sensation] : the sensory exam was normal to light touch and pinprick [] : no rash [No Focal Deficits] : no focal deficits [Deep Tendon Reflexes (DTR)] : deep tendon reflexes were 2+ and symmetric [Oriented To Time, Place, And Person] : oriented to person, place, and time [Impaired Insight] : insight and judgment were intact [Affect] : the affect was normal [FreeTextEntry1] : coarse BS bilaterally

## 2019-11-11 NOTE — CONSULT LETTER
[Dear  ___] : Dear  [unfilled], [FreeTextEntry3] : Shaan Sotomayor MD, MPH \par System Director of Thoracic Surgery \par Director of Comprehensive Lung [facility] [place] \par Professor Cardiovascular & Thoracic Surgery  \par [facility] [employer] [facility]\par

## 2019-11-19 ENCOUNTER — APPOINTMENT (OUTPATIENT)
Dept: INTERVENTIONAL RADIOLOGY/VASCULAR | Facility: CLINIC | Age: 54
End: 2019-11-19
Payer: MEDICAID

## 2019-11-19 VITALS
BODY MASS INDEX: 21.68 KG/M2 | RESPIRATION RATE: 20 BRPM | OXYGEN SATURATION: 96 % | SYSTOLIC BLOOD PRESSURE: 112 MMHG | DIASTOLIC BLOOD PRESSURE: 69 MMHG | HEART RATE: 89 BPM | HEIGHT: 64 IN | WEIGHT: 127 LBS

## 2019-11-19 DIAGNOSIS — R91.8 OTHER NONSPECIFIC ABNORMAL FINDING OF LUNG FIELD: ICD-10-CM

## 2019-11-19 PROCEDURE — 99205 OFFICE O/P NEW HI 60 MIN: CPT

## 2019-11-19 RX ORDER — IPRATROPIUM BROMIDE AND ALBUTEROL 20; 100 UG/1; UG/1
20-100 SPRAY, METERED RESPIRATORY (INHALATION)
Refills: 0 | Status: ACTIVE | COMMUNITY
Start: 2019-11-14

## 2019-11-19 RX ORDER — OXYCODONE 10 MG/1
10 TABLET ORAL
Refills: 0 | Status: ACTIVE | COMMUNITY
Start: 2019-11-14

## 2019-11-19 RX ORDER — METHADONE HCL 10 MG
10 TABLET ORAL
Refills: 0 | Status: DISCONTINUED | COMMUNITY
End: 2019-11-19

## 2019-11-19 RX ORDER — METHADONE HYDROCHLORIDE 10 MG/ML
INJECTION, SOLUTION INTRAMUSCULAR; INTRAVENOUS; SUBCUTANEOUS
Refills: 0 | Status: ACTIVE | COMMUNITY

## 2019-11-19 RX ORDER — METHADONE HYDROCHLORIDE 40 MG/1
40 TABLET ORAL
Refills: 0 | Status: DISCONTINUED | COMMUNITY
End: 2019-11-19

## 2019-11-19 RX ORDER — METHADONE HYDROCHLORIDE 5 MG/1
5 TABLET ORAL
Refills: 0 | Status: DISCONTINUED | COMMUNITY
End: 2019-11-19

## 2019-11-19 NOTE — ASSESSMENT
[FreeTextEntry1] : PET/CT reviewed with the patient and her cousin.  Plan is for CT guided core needle biopsy as well as aspiration of the large area of PET activity with anesthesia.  Samples will be sent for culture as well as cytology.  Risks of percutaneous biopsy, including but not limited to bleeding, pneumothorax requiring a chest tube, infection and injury to surrounding structure, discussed with the patient.  All questions were answered.  Patient elects to proceed with the biopsy.

## 2019-11-19 NOTE — HISTORY OF PRESENT ILLNESS
[FreeTextEntry1] : 54 years old male with hx of asthma, SOB cough, wheezing presented to ED in September. Heroin user (quit heroin on 8/3/19, currently uses methadone 55mg QD). Admitted for treatment of PNA.  She had chest CT scan done which demonstrated, "Dense consolidation in the right middle and lower lobe with diffuse right lung bronchovascular thickening as well as mediastinal adenopathy. Complete opacification of the right middle lobe bronchi. Findings concerning for underlying neoplasm. Extension of right lung consolidation into the right hilum. Limited evaluation for vascular invasion or constriction secondary to lack of IV contrast. Mild emphysema".\par \par Bronchoscopy, BAL on 9/13/19, path was negative for malignant cell. \par \par She was transferred to medicine for management of multifocal PNA and Klebsiella PA, as well as being followed by ID. \par \par Patient was seen by Dr. Sotomayor in October and plan was for her to have PET/Ct scan done and f/u. She had the PET/Ct scan done on 11/01/19 which demonstrated, " Extensive right lung consolidation with heterogeneous FDG activity with large, photopenic hypodense area in right lower lobe, similar in appearance as compared to CT dated 9/10/2019. Primary consideration is infection with abscess formation. FDG-avid right pleural thickening, increased since 9/10/2019 may be related to empyema. Mildly FDG-avid infiltration of right anterolateral chest wall at the level of [id number] intercostal space. These findings likely are inflammatory, possibly related to procedure. Correlate clinically. Mildly FDG-avid lymph nodes in right supraclavicular, mediastinal, and right perihilar regions probably are inflammatory". \par \par Patient is being referred by Dr. Sotomayor for consultation to discuss  CT-guided FNA of the Rt lung by IR.\par \par Patient states she has right sided chest pain 7/10 takes oxycodone with relief. \par \par Patient sates she has been feeling well overall. Appetite has been good no unintentional weight loss.\par \par Denies any recent fever, chills, n/v/d, no hemoptysis. She has SOB at all times. She has cough\par \par Patient had the blood work done on 11/14/19.\par \par \par Hem/onc Dr Tacos Rosado tel [phone] tel   Fax [phone]\par \par \par \par

## 2019-12-01 ENCOUNTER — FORM ENCOUNTER (OUTPATIENT)
Age: 54
End: 2019-12-01

## 2019-12-02 ENCOUNTER — OUTPATIENT (OUTPATIENT)
Dept: OUTPATIENT SERVICES | Facility: HOSPITAL | Age: 54
LOS: 1 days | End: 2019-12-02
Payer: MEDICAID

## 2019-12-02 PROCEDURE — 71250 CT THORAX DX C-: CPT | Mod: 26

## 2019-12-13 DIAGNOSIS — R91.8 OTHER NONSPECIFIC ABNORMAL FINDING OF LUNG FIELD: ICD-10-CM

## 2020-03-14 NOTE — ED PROVIDER NOTE - PR
2223: Calling family due to pt's rapid deterioration. Spoke to nursing supervisor and informed that family will be visiting due to pt's critical condition and appears he could code at any time. 2225: Fighting vent, precedex increased. 2227: Dr. Angelique Mallory update on  LA 10.88, ABG result for 2222 and Ionized calcium 0.98 with 1G infusing. 2231: Pt is awake and able to follow commands by squeezing hands. 2307: Family arrived  2315: Updated Dr. Angelique Mallory on being to 5mcg of epi and 5 of levo with SBP in 76s. . Order for 1 amp of bicarb and dobutamine at 2.5 for BP support. Order to call vascular surgery to see if they want to take pt to OR for stent. 2322: Updated Dr. Dior Whaley on pt's current gtts, VS, CRRT, labs, and order to call from [facility] to see about going to OR. Dr. Dior Whaley will not take pt to OR tonight as he is too unstable. Order to max current pressors as needed up to standard max rates, no 3rd pressor ordered. 2330: Spiritual care paged per family request.   0000: Increased edema noted to LLE with mottling beginning on foot. Doppler pulses audible PT and DP. RASS 0- -1, will squeeze hands. Hard to maintain adequate level of sedation with vasopressor support due to rapid fluctuations of BP when waking. 0015:  at beside speaking and praying with family. 0042: Coughing and gagging RR 34  0101: . D5 gtt held at this time. 0230: Continuing to increase pressors in attempt to meet BP goals. Pt does awaken and follow commands, but has a difficult time tolerating the vent. Family does not seem to understand the severity of the patient's current condition- asking if his BP gets to a certain number will the surgeon come in to operate. Educated on increasing need for vasopressor support and explained that he also has hepatic, renal, and respiratory dysfunction. 6694: Lactic acid 11.84, marginally improved since last check. 0300: Reassessment completed. Continues to require more pressor support.  Does wake up and follow commands. BP increases and does not tolerate vent well while awake.   0304: Post transfusion H&H sent with other labs at [facility], but has yet to result. Called heamtology to check on status. Per lab there is not an order for an H&H, despite writer being able to see the order in Epic that was placed with transfusion orders. Reordered lab so that it can be ran. 56: Called Dr. Pamela Carney and updated on most recent ABG, electrolytes, current gtts, and VS. Also informed that Hgb on ABG is only 6.8, waiting for H&H to result. Order for change in Post replacement fluid and to give 1U PRBC if Hgb is less than 7.   0513: CRRT clotting. 190ml of blood returned. 4265: Shyann filter priming.  0525: Labs collected and sent to processing. 0540: Base excess -8 and LA 14.7. Will notify nephrology when renal panel is resulted by lab. 0550: CRRT running  PD 11.  0642: Called lab to check on GI profilel.  states it is running now and should be finished in 10 minutes.   0730: Handoff with Victor Hugo Esparza RN [id number]

## 2020-05-13 NOTE — PROGRESS NOTE ADULT - PROBLEM SELECTOR PROBLEM 4
Asthma
Asthma, mild intermittent, uncomplicated    Cancer  protate  Diabetes mellitus    Hyperlipidemia    Hypertension    MI (myocardial infarction)    Other hemorrhoids    Sickle cell anemia  Carrier of the disease, presently doesn't have it
Leukopenia
Current smoker
Current smoker
Leukopenia
Current smoker
Leukopenia
Leukopenia
R/O Anemia
R/O Anemia
Asthma exacerbation
Leukopenia
Methadone maintenance therapy patient
Leukopenia

## 2021-06-29 NOTE — PROGRESS NOTE ADULT - PROBLEM SELECTOR PLAN 1
Persistent but stable anemia, H/H 8.7/27.2 today  Iron def anemia vs malignancy vs GIB  -GI Dr. Hernandez  -Underwent EGD/Colonoscopy today-non bleeding ulcer, otherwise unremarkable.    -Cont Protonix  -Hem Dr. Rosado, note appreciated  -Anemia likely due to sepsis   -Cont folic acid  -f/u CBC Detail Level: Zone Tetracycline Pregnancy And Lactation Text: This medication is Pregnancy Category D and not consider safe during pregnancy. It is also excreted in breast milk. Erythromycin Counseling:  I discussed with the patient the risks of erythromycin including but not limited to GI upset, allergic reaction, drug rash, diarrhea, increase in liver enzymes, and yeast infections. Topical Retinoid Pregnancy And Lactation Text: This medication is Pregnancy Category C. It is unknown if this medication is excreted in breast milk. Use Enhanced Medication Counseling?: No High Dose Vitamin A Counseling: Side effects reviewed, pt to contact office should one occur. Azithromycin Pregnancy And Lactation Text: This medication is considered safe during pregnancy and is also secreted in breast milk. Tazorac Counseling:  Patient advised that medication is irritating and drying.  Patient may need to apply sparingly and wash off after an hour before eventually leaving it on overnight.  The patient verbalized understanding of the proper use and possible adverse effects of tazorac.  All of the patient's questions and concerns were addressed. Topical Sulfur Applications Counseling: Topical Sulfur Counseling: Patient counseled that this medication may cause skin irritation or allergic reactions.  In the event of skin irritation, the patient was advised to reduce the amount of the drug applied or use it less frequently.   The patient verbalized understanding of the proper use and possible adverse effects of topical sulfur application.  All of the patient's questions and concerns were addressed. High Dose Vitamin A Pregnancy And Lactation Text: High dose vitamin A therapy is contraindicated during pregnancy and breast feeding. Topical Sulfur Applications Pregnancy And Lactation Text: This medication is Pregnancy Category C and has an unknown safety profile during pregnancy. It is unknown if this topical medication is excreted in breast milk. Bactrim Counseling:  I discussed with the patient the risks of sulfa antibiotics including but not limited to GI upset, allergic reaction, drug rash, diarrhea, dizziness, photosensitivity, and yeast infections.  Rarely, more serious reactions can occur including but not limited to aplastic anemia, agranulocytosis, methemoglobinemia, blood dyscrasias, liver or kidney failure, lung infiltrates or desquamative/blistering drug rashes. Dapsone Counseling: I discussed with the patient the risks of dapsone including but not limited to hemolytic anemia, agranulocytosis, rashes, methemoglobinemia, kidney failure, peripheral neuropathy, headaches, GI upset, and liver toxicity.  Patients who start dapsone require monitoring including baseline LFTs and weekly CBCs for the first month, then every month thereafter.  The patient verbalized understanding of the proper use and possible adverse effects of dapsone.  All of the patient's questions and concerns were addressed. Benzoyl Peroxide Counseling: Patient counseled that medicine may cause skin irritation and bleach clothing.  In the event of skin irritation, the patient was advised to reduce the amount of the drug applied or use it less frequently.   The patient verbalized understanding of the proper use and possible adverse effects of benzoyl peroxide.  All of the patient's questions and concerns were addressed. Spironolactone Counseling: Patient advised regarding risks of diarrhea, abdominal pain, hyperkalemia, birth defects (for female patients), liver toxicity and renal toxicity. The patient may need blood work to monitor liver and kidney function and potassium levels while on therapy. The patient verbalized understanding of the proper use and possible adverse effects of spironolactone.  All of the patient's questions and concerns were addressed. Dapsone Pregnancy And Lactation Text: This medication is Pregnancy Category C and is not considered safe during pregnancy or breast feeding. Erythromycin Pregnancy And Lactation Text: This medication is Pregnancy Category B and is considered safe during pregnancy. It is also excreted in breast milk. Bactrim Pregnancy And Lactation Text: This medication is Pregnancy Category D and is known to cause fetal risk.  It is also excreted in breast milk. Benzoyl Peroxide Pregnancy And Lactation Text: This medication is Pregnancy Category C. It is unknown if benzoyl peroxide is excreted in breast milk. Tazorac Pregnancy And Lactation Text: This medication is not safe during pregnancy. It is unknown if this medication is excreted in breast milk. Minocycline Counseling: Patient advised regarding possible photosensitivity and discoloration of the teeth, skin, lips, tongue and gums.  Patient instructed to avoid sunlight, if possible.  When exposed to sunlight, patients should wear protective clothing, sunglasses, and sunscreen.  The patient was instructed to call the office immediately if the following severe adverse effects occur:  hearing changes, easy bruising/bleeding, severe headache, or vision changes.  The patient verbalized understanding of the proper use and possible adverse effects of minocycline.  All of the patient's questions and concerns were addressed. Spironolactone Pregnancy And Lactation Text: This medication can cause feminization of the male fetus and should be avoided during pregnancy. The active metabolite is also found in breast milk. Doxycycline Counseling:  Patient counseled regarding possible photosensitivity and increased risk for sunburn.  Patient instructed to avoid sunlight, if possible.  When exposed to sunlight, patients should wear protective clothing, sunglasses, and sunscreen.  The patient was instructed to call the office immediately if the following severe adverse effects occur:  hearing changes, easy bruising/bleeding, severe headache, or vision changes.  The patient verbalized understanding of the proper use and possible adverse effects of doxycycline.  All of the patient's questions and concerns were addressed. Topical Clindamycin Counseling: Patient counseled that this medication may cause skin irritation or allergic reactions.  In the event of skin irritation, the patient was advised to reduce the amount of the drug applied or use it less frequently.   The patient verbalized understanding of the proper use and possible adverse effects of clindamycin.  All of the patient's questions and concerns were addressed. Isotretinoin Counseling: Patient should get monthly blood tests, not donate blood, not drive at night if vision affected, not share medication, and not undergo elective surgery for 6 months after tx completed. Side effects reviewed, pt to contact office should one occur. Topical Retinoid counseling:  Patient advised to apply a pea-sized amount only at bedtime and wait 30 minutes after washing their face before applying.  If too drying, patient may add a non-comedogenic moisturizer. The patient verbalized understanding of the proper use and possible adverse effects of retinoids.  All of the patient's questions and concerns were addressed. Isotretinoin Pregnancy And Lactation Text: This medication is Pregnancy Category X and is considered extremely dangerous during pregnancy. It is unknown if it is excreted in breast milk. Azithromycin Counseling:  I discussed with the patient the risks of azithromycin including but not limited to GI upset, allergic reaction, drug rash, diarrhea, and yeast infections. Birth Control Pills Counseling: Birth Control Pill Counseling: I discussed with the patient the potential side effects of OCPs including but not limited to increased risk of stroke, heart attack, thrombophlebitis, deep venous thrombosis, hepatic adenomas, breast changes, GI upset, headaches, and depression.  The patient verbalized understanding of the proper use and possible adverse effects of OCPs. All of the patient's questions and concerns were addressed. Topical Clindamycin Pregnancy And Lactation Text: This medication is Pregnancy Category B and is considered safe during pregnancy. It is unknown if it is excreted in breast milk. Sarecycline Counseling: Patient advised regarding possible photosensitivity and discoloration of the teeth, skin, lips, tongue and gums.  Patient instructed to avoid sunlight, if possible.  When exposed to sunlight, patients should wear protective clothing, sunglasses, and sunscreen.  The patient was instructed to call the office immediately if the following severe adverse effects occur:  hearing changes, easy bruising/bleeding, severe headache, or vision changes.  The patient verbalized understanding of the proper use and possible adverse effects of sarecycline.  All of the patient's questions and concerns were addressed. Birth Control Pills Pregnancy And Lactation Text: This medication should be avoided if pregnant and for the first 30 days post-partum. Tetracycline Counseling: Patient counseled regarding possible photosensitivity and increased risk for sunburn.  Patient instructed to avoid sunlight, if possible.  When exposed to sunlight, patients should wear protective clothing, sunglasses, and sunscreen.  The patient was instructed to call the office immediately if the following severe adverse effects occur:  hearing changes, easy bruising/bleeding, severe headache, or vision changes.  The patient verbalized understanding of the proper use and possible adverse effects of tetracycline.  All of the patient's questions and concerns were addressed. Patient understands to avoid pregnancy while on therapy due to potential birth defects. Doxycycline Pregnancy And Lactation Text: This medication is Pregnancy Category D and not consider safe during pregnancy. It is also excreted in breast milk but is considered safe for shorter treatment courses.

## 2021-11-09 NOTE — ED ADULT TRIAGE NOTE - CADM TRG TX PRIOR TO ARRIVAL
Patient states she has gained a considerable amount of weight and has been overeating sweets. She also was recently diagnosed with colitis. At this time, she would like to try and change her diet before increasing Crestor. FLP orders entered. Pt will test again in April.       ----- Message from MARIANA Barros sent at 11/8/2021  2:06 PM CST -----  FLP numbers are up. How has the diet been? If diet is unchanged then we should increase Crestor to 40 mg nightly. No need to repeat FLP.   Component      Latest Ref Rng & Units 5/21/2019 10/12/2020 11/3/2021   FASTING STATUS      hrs 12     CHOLESTEROL      <=199 mg/dL 175 162 182   CALCULATED LDL      <=129 mg/dL 80 69 80   HDL      >=50 mg/dL 77 72 72   TRIGLYCERIDE      <=149 mg/dL 91 105 149   CALCULATED NON HDL      mg/dL 98 90 110   CHOL/HDL      <=4.4 2.3 2.3 2.5   Fasting Status      0 - 999 Hours  16 12     
none

## 2022-04-08 NOTE — BEHAVIORAL HEALTH ASSESSMENT NOTE - EMPLOYMENT
Spoke with Gustavo Loya  Neurology appointment is scheduled for 4/14/22  Had eye appointment with [place] for [facility] 
Unemployed

## 2022-04-25 NOTE — PROGRESS NOTE ADULT - PROBLEM SELECTOR PROBLEM 2
From: Ella Ortiz  To: April No  Sent: 4/23/2022 5:42 PM CDT  Subject: Upcoming gynecologist appt    Can you please send over ultrasound images and results to Dr. Cristina Holman office at [address] [place] [address] A, I have an appointment on Wednesday, April 27th. Thank you  
Asthma exacerbation
Alcoholic liver disease
Anemia
Anemia
Asthma exacerbation
Lung mass
Lung mass
Asthma exacerbation
Lung mass
Lung mass
Asthma
Anemia
Asthma
Pneumonia of right lower lobe due to infectious organism

## 2024-05-09 NOTE — PATIENT PROFILE ADULT - BRADEN ACTIVITY
amphetamine-dextroamphetamine (Adderall) 20 MG tablet         Sig: Take 1 tablet by mouth 2 times daily.    Disp: 60 tablet    Refills: 0    Start: 5/8/2024 - 6/7/2024    Earliest Fill Date: 5/8/2024    Class: Eprescribe    Non-formulary For: ADHD (attention deficit hyperactivity disorder), inattentive type    Last ordered: 1 month ago (3/14/2024) by Jermaine Carrillo MD     Last OV 3/14/24  Next OV 3/26/25  Last sold 3/14/24  Medication prepped   
(3) walks occasionally

## 2024-07-26 NOTE — DIETITIAN INITIAL EVALUATION ADULT. - PROBLEM SELECTOR PLAN 2
Allergies  Social History     Socioeconomic History    Marital status:      Spouse name: Not on file    Number of children: Not on file    Years of education: Not on file    Highest education level: Not on file   Occupational History    Not on file   Tobacco Use    Smoking status: Never    Smokeless tobacco: Current   Substance and Sexual Activity    Alcohol use: Yes     Alcohol/week: 12.0 standard drinks of alcohol     Types: 12 Cans of beer per week    Drug use: No    Sexual activity: Not Currently     Partners: Female   Other Topics Concern    Not on file   Social History Narrative        2 years [employer]    Enjoys golf    NS    Minimal ETOH    Gnosticist     Social Determinants of Health     Financial Resource Strain: Low Risk  (5/18/2024)    Overall Financial Resource Strain (CARDIA)     Difficulty of Paying Living Expenses: Not hard at all   Food Insecurity: No Food Insecurity (5/18/2024)    Hunger Vital Sign     Worried About Running Out of Food in the Last Year: Never true     Ran Out of Food in the Last Year: Never true   Transportation Needs: Unknown (5/18/2024)    PRAPARE - Transportation     Lack of Transportation (Medical): Not on file     Lack of Transportation (Non-Medical): No   Physical Activity: Not on file   Stress: Not on file   Social Connections: Not on file   Intimate Partner Violence: Not on file   Housing Stability: Unknown (5/18/2024)    Housing Stability Vital Sign     Unable to Pay for Housing in the Last Year: Not on file     Number of Places Lived in the Last Year: Not on file     Unstable Housing in the Last Year: No     Family History   Problem Relation Age of Onset    No Known Problems Sister     No Known Problems Brother     Heart Disease Mother         stents    Unknown Mother        There were no vitals taken for this visit.    UA - Dipstick  Results for orders placed or performed in visit on 07/26/24   AMB POC URINALYSIS DIP STICK AUTO W/O MICRO   Result Value 
p/w SOB with  wheezing   s/p :  Duo neb, Solu poli, Mg So4 in Ed  -CXR, CT chest; as above   -peak flow: 110 in ED   - C/w solu medrol 40 q6, Ailin Guo consulted.
